# Patient Record
Sex: MALE | Race: WHITE | NOT HISPANIC OR LATINO
[De-identification: names, ages, dates, MRNs, and addresses within clinical notes are randomized per-mention and may not be internally consistent; named-entity substitution may affect disease eponyms.]

---

## 2019-07-25 ENCOUNTER — HOSPITAL (OUTPATIENT)
Dept: OTHER | Age: 54
End: 2019-07-25

## 2019-07-25 LAB
GLUCOSE BLDC GLUCOMTR-MCNC: 143 MG/DL (ref 65–99)
GLUCOSE BLDC GLUCOMTR-MCNC: ABNORMAL MG/DL

## 2019-09-11 ENCOUNTER — HOSPITAL (OUTPATIENT)
Dept: OTHER | Age: 54
End: 2019-09-11
Attending: ORTHOPAEDIC SURGERY

## 2019-12-20 PROBLEM — E11.9 TYPE 2 DIABETES MELLITUS WITHOUT COMPLICATION, WITHOUT LONG-TERM CURRENT USE OF INSULIN (HCC): Status: ACTIVE | Noted: 2019-12-20

## 2019-12-20 PROBLEM — E78.2 MIXED HYPERLIPIDEMIA: Status: ACTIVE | Noted: 2019-12-20

## 2019-12-20 PROBLEM — I10 BENIGN ESSENTIAL HTN: Status: ACTIVE | Noted: 2019-12-20

## 2020-02-24 PROBLEM — R93.1 AGATSTON CORONARY ARTERY CALCIUM SCORE GREATER THAN 400: Status: ACTIVE | Noted: 2020-02-24

## 2020-02-24 PROBLEM — I25.10 CAD IN NATIVE ARTERY: Status: ACTIVE | Noted: 2020-02-24

## 2020-02-24 PROBLEM — I42.9 IDIOPATHIC CARDIOMYOPATHY (HCC): Status: ACTIVE | Noted: 2020-02-24

## 2020-02-24 PROBLEM — I10 BENIGN ESSENTIAL HTN: Status: RESOLVED | Noted: 2019-12-20 | Resolved: 2020-02-24

## 2020-03-06 ENCOUNTER — HOSPITAL ENCOUNTER (OUTPATIENT)
Dept: GENERAL RADIOLOGY | Facility: HOSPITAL | Age: 55
Discharge: HOME | End: 2020-03-06
Attending: OTOLARYNGOLOGY
Payer: COMMERCIAL

## 2020-03-06 DIAGNOSIS — R47.02 DYSPHASIA: ICD-10-CM

## 2020-03-06 PROCEDURE — 74230 X-RAY XM SWLNG FUNCJ C+: CPT | Performed by: OTOLARYNGOLOGY

## 2020-03-06 PROCEDURE — 92611 MOTION FLUOROSCOPY/SWALLOW: CPT

## 2020-03-06 NOTE — PROGRESS NOTES
ADULT VIDEOFLUOROSCOPIC SWALLOWING STUDY    Admission Date: 3/6/2020  Evaluation Date: 03/06/20  Radiologist: Dr. Trevizo Rash: Regular  Diet Recommendations - Liquid:  Thin    Further Follow-up: swallowing. ASSESSMENT   DYSPHAGIA ASSESSMENT  Test completed in conjunction with Radiologist.  Patient Positioned: Upright;Midline;Standard Chair. Patient Viewed: Lateral.   .  Consistencies Presented: Thin liquids; Hard solid to assess oropharyngeal is likely not a purely sensory issue. Given the above, recommend consider further GI work up for possible esophageal involvement in patient's dysphagia.             PLAN: Consider further GI workup; patient indicating he would like to wait for further

## 2020-03-10 NOTE — PROGRESS NOTES
Good afternoon, Mr. Mera Guzman,  Thank you for obtaining your video swallow studies and esophagram. I am seeing in the report that you do have evidence of fluid passing the vocal cords indicating a swallow dysfunction.  The speech therapist documented that

## 2020-07-14 PROBLEM — D86.9 SARCOIDOSIS: Status: ACTIVE | Noted: 2020-07-14

## 2020-07-14 PROBLEM — C43.60: Status: ACTIVE | Noted: 2020-07-14

## 2020-09-25 RX ORDER — CANAGLIFLOZIN 100 MG/1
100 TABLET, FILM COATED ORAL
COMMUNITY
End: 2020-10-12

## 2020-09-25 RX ORDER — METFORMIN HYDROCHLORIDE 500 MG/1
TABLET, FILM COATED, EXTENDED RELEASE ORAL
COMMUNITY
End: 2020-10-14

## 2020-09-25 RX ORDER — ACETAMINOPHEN 500 MG
1000 TABLET ORAL ONCE
Status: CANCELLED | OUTPATIENT
Start: 2020-09-25 | End: 2020-09-25

## 2020-09-26 ENCOUNTER — ANESTHESIA EVENT (OUTPATIENT)
Dept: ENDOSCOPY | Facility: HOSPITAL | Age: 55
End: 2020-09-26
Payer: COMMERCIAL

## 2020-09-26 ENCOUNTER — LAB ENCOUNTER (OUTPATIENT)
Dept: LAB | Age: 55
End: 2020-09-26
Attending: INTERNAL MEDICINE
Payer: COMMERCIAL

## 2020-09-26 DIAGNOSIS — Z01.818 PREOP TESTING: ICD-10-CM

## 2020-09-27 LAB — SARS-COV-2 RNA RESP QL NAA+PROBE: NOT DETECTED

## 2020-09-28 NOTE — PROGRESS NOTES
Lab test for COVID 19 is negative     Illa Schwab, MD  [address] [place] [address] Gastroenterology

## 2020-09-28 NOTE — PAT NURSING NOTE
Chart reviewed by anesthesiologist,Dr. Tiffani Turner for cardiomyopathy. Received an order for medical and cardiac clearance. Faxed this request to the surgeon, Dr. Hima Bragg (PCP) and Dr. Geno Sosa (cardiologist). Received fax confirmation.  Telephoned Dr. Tere Bernal office

## 2020-09-29 ENCOUNTER — ANESTHESIA (OUTPATIENT)
Dept: ENDOSCOPY | Facility: HOSPITAL | Age: 55
End: 2020-09-29
Payer: COMMERCIAL

## 2020-09-29 ENCOUNTER — HOSPITAL ENCOUNTER (OUTPATIENT)
Facility: HOSPITAL | Age: 55
Setting detail: HOSPITAL OUTPATIENT SURGERY
Discharge: HOME | End: 2020-09-29
Attending: INTERNAL MEDICINE | Admitting: INTERNAL MEDICINE
Payer: COMMERCIAL

## 2020-09-29 VITALS
HEART RATE: 62 BPM | HEIGHT: 65.5 IN | TEMPERATURE: 98 F | WEIGHT: 158 LBS | OXYGEN SATURATION: 100 % | RESPIRATION RATE: 20 BRPM | SYSTOLIC BLOOD PRESSURE: 105 MMHG | BODY MASS INDEX: 26.01 KG/M2 | DIASTOLIC BLOOD PRESSURE: 63 MMHG

## 2020-09-29 DIAGNOSIS — R10.13 DYSPEPSIA: ICD-10-CM

## 2020-09-29 DIAGNOSIS — K62.5 RECTAL BLEEDING: ICD-10-CM

## 2020-09-29 DIAGNOSIS — Z01.818 PREOP TESTING: Primary | ICD-10-CM

## 2020-09-29 PROCEDURE — 88305 TISSUE EXAM BY PATHOLOGIST: CPT | Performed by: INTERNAL MEDICINE

## 2020-09-29 PROCEDURE — 0DB98ZX EXCISION OF DUODENUM, VIA NATURAL OR ARTIFICIAL OPENING ENDOSCOPIC, DIAGNOSTIC: ICD-10-PCS | Performed by: INTERNAL MEDICINE

## 2020-09-29 PROCEDURE — 0DB78ZX EXCISION OF STOMACH, PYLORUS, VIA NATURAL OR ARTIFICIAL OPENING ENDOSCOPIC, DIAGNOSTIC: ICD-10-PCS | Performed by: INTERNAL MEDICINE

## 2020-09-29 PROCEDURE — 82962 GLUCOSE BLOOD TEST: CPT

## 2020-09-29 PROCEDURE — 0DBB8ZX EXCISION OF ILEUM, VIA NATURAL OR ARTIFICIAL OPENING ENDOSCOPIC, DIAGNOSTIC: ICD-10-PCS | Performed by: INTERNAL MEDICINE

## 2020-09-29 PROCEDURE — 0DBG8ZX EXCISION OF LEFT LARGE INTESTINE, VIA NATURAL OR ARTIFICIAL OPENING ENDOSCOPIC, DIAGNOSTIC: ICD-10-PCS | Performed by: INTERNAL MEDICINE

## 2020-09-29 RX ORDER — DEXTROSE MONOHYDRATE 25 G/50ML
50 INJECTION, SOLUTION INTRAVENOUS
Status: DISCONTINUED | OUTPATIENT
Start: 2020-09-29 | End: 2020-09-29

## 2020-09-29 RX ORDER — SODIUM CHLORIDE, SODIUM LACTATE, POTASSIUM CHLORIDE, CALCIUM CHLORIDE 600; 310; 30; 20 MG/100ML; MG/100ML; MG/100ML; MG/100ML
INJECTION, SOLUTION INTRAVENOUS CONTINUOUS
Status: DISCONTINUED | OUTPATIENT
Start: 2020-09-29 | End: 2020-09-29

## 2020-09-29 RX ORDER — LIDOCAINE HYDROCHLORIDE 10 MG/ML
INJECTION, SOLUTION EPIDURAL; INFILTRATION; INTRACAUDAL; PERINEURAL AS NEEDED
Status: DISCONTINUED | OUTPATIENT
Start: 2020-09-29 | End: 2020-09-29 | Stop reason: SURG

## 2020-09-29 RX ORDER — NALOXONE HYDROCHLORIDE 0.4 MG/ML
80 INJECTION, SOLUTION INTRAMUSCULAR; INTRAVENOUS; SUBCUTANEOUS AS NEEDED
Status: DISCONTINUED | OUTPATIENT
Start: 2020-09-29 | End: 2020-09-29

## 2020-09-29 RX ADMIN — LIDOCAINE HYDROCHLORIDE 50 MG: 10 INJECTION, SOLUTION EPIDURAL; INFILTRATION; INTRACAUDAL; PERINEURAL at 07:17:00

## 2020-09-29 NOTE — ANESTHESIA PREPROCEDURE EVALUATION
PRE-OP EVALUATION    Patient Name: Lady Urena    Pre-op Diagnosis: Rectal bleeding [K62.5]  Dyspepsia [R10.13]    Procedure(s):  COLONOSCOPY / ESOPHAGOGASTRODUODENOSCOPY      Surgeon(s) and Role:     * Cookie Bianchi MD - Primary    Pre-op gilma Evaluation    Patient summary reviewed. Anesthetic Complications  (-) history of anesthetic complications         GI/Hepatic/Renal             (+) chronic renal disease and CRI                   Cardiovascular      ECG reviewed.   Exercise tolerance: goo anesthesia consent were reviewed with the patient. The consent was signed without further questions.

## 2020-09-29 NOTE — OPERATIVE REPORT
ENDOSCOPY OPERATIVE REPORT    Patient Name:  Rene Wilson  Medical Record #: [id number]  YOB: 1965  Date of Procedure: 9/29/2020    Preoperative Diagnosis:  Anemia, dyspepsia, rectal bleeding.   Hx of sarcoid    Postoperative Diagnosis unremarkable. No ulcers. Biopsies from the prepyloric area, antrum, body, and fundus were taken for histology and for H. Pylori.      A normal pylorus was passed and the duodenal bulb entered, the duodenal bulb and post-bulbar duodenum were unremarkable asi in about 10 years        Plan is to discharge home when Anesthesia post-surgical assessment determines patient is stable and has met discharge criteria.      The patient and  were informed of the endoscopic findings and was also given a copy of findin

## 2020-09-29 NOTE — BRIEF OP NOTE
Pre-Operative Diagnosis: Rectal bleeding [K62.5]  Dyspepsia [R10.13]     Post-Operative Diagnosis: Duodenal diverticulum, ileal and colonic ulcers      Procedure Performed:   Procedure(s):  COLONOSCOPY with biopsies  ESOPHAGOGASTRODUODENOSCOPY with biopsie

## 2020-09-29 NOTE — H&P
I have examined this patient and there are no changes to h/p 9/14/20,advised he f/u w/Dr Bob Castro for non gi causes of anemia.     We rediscussed the risks, benefits, alternatives and limitations to the procedure and sedation, all the patients questions were

## 2020-09-29 NOTE — ANESTHESIA POSTPROCEDURE EVALUATION
Lina Merino 421 Patient Status:  Hospital Outpatient Surgery   Age/Gender 60year old male MRN [id number]   Location [facility]. Attending Judith Walker MD   Hosp Day # 0 PCP Tia Mcbride DO       Anesthesia Post-

## 2020-10-02 NOTE — PROGRESS NOTES
Here are the biopsy/pathology findings from your recent EGD (uppper endoscopy) and colonoscopy:    1) upper endoscopy appeared okay.  Stomach and small bowel biopsies were taken   -->biposies did not show celiac disease or the H.pylori stomach bacteria    2

## 2020-10-12 PROBLEM — D63.8 ANEMIA OF CHRONIC ILLNESS: Status: ACTIVE | Noted: 2020-10-12

## 2020-10-12 PROBLEM — D86.0 SARCOIDOSIS OF LUNG (HCC): Status: ACTIVE | Noted: 2020-07-14

## 2020-10-23 DIAGNOSIS — D64.9 ANEMIA: Primary | ICD-10-CM

## 2020-10-23 PROCEDURE — 88313 SPECIAL STAINS GROUP 2: CPT | Performed by: NURSE PRACTITIONER

## 2020-10-23 PROCEDURE — 88305 TISSUE EXAM BY PATHOLOGIST: CPT | Performed by: NURSE PRACTITIONER

## 2020-10-23 PROCEDURE — 88341 IMHCHEM/IMCYTCHM EA ADD ANTB: CPT | Performed by: NURSE PRACTITIONER

## 2020-10-23 PROCEDURE — 88342 IMHCHEM/IMCYTCHM 1ST ANTB: CPT | Performed by: NURSE PRACTITIONER

## 2020-10-24 PROCEDURE — 85097 BONE MARROW INTERPRETATION: CPT | Performed by: NURSE PRACTITIONER

## 2020-10-24 PROCEDURE — 88305 TISSUE EXAM BY PATHOLOGIST: CPT | Performed by: NURSE PRACTITIONER

## 2020-11-18 ENCOUNTER — HOSPITAL ENCOUNTER (OUTPATIENT)
Dept: RESPIRATORY THERAPY | Facility: HOSPITAL | Age: 55
Discharge: HOME | End: 2020-11-18
Attending: INTERNAL MEDICINE
Payer: COMMERCIAL

## 2020-11-18 PROCEDURE — 84132 ASSAY OF SERUM POTASSIUM: CPT | Performed by: INTERNAL MEDICINE

## 2020-11-18 PROCEDURE — 84295 ASSAY OF SERUM SODIUM: CPT | Performed by: INTERNAL MEDICINE

## 2020-11-18 PROCEDURE — 83605 ASSAY OF LACTIC ACID: CPT | Performed by: INTERNAL MEDICINE

## 2020-11-18 PROCEDURE — 83050 HGB METHEMOGLOBIN QUAN: CPT | Performed by: INTERNAL MEDICINE

## 2020-11-18 PROCEDURE — 85018 HEMOGLOBIN: CPT | Performed by: INTERNAL MEDICINE

## 2020-11-18 PROCEDURE — 82375 ASSAY CARBOXYHB QUANT: CPT | Performed by: INTERNAL MEDICINE

## 2020-11-18 PROCEDURE — 36600 WITHDRAWAL OF ARTERIAL BLOOD: CPT | Performed by: INTERNAL MEDICINE

## 2020-11-18 PROCEDURE — 82805 BLOOD GASES W/O2 SATURATION: CPT | Performed by: INTERNAL MEDICINE

## 2020-11-18 PROCEDURE — 82330 ASSAY OF CALCIUM: CPT | Performed by: INTERNAL MEDICINE

## 2020-11-18 NOTE — PROGRESS NOTES
Contacted patient. Discussed results with patient and patient's wife who will share results with pulmonologist at [facility].

## 2022-05-10 ENCOUNTER — TELEPHONE (OUTPATIENT)
Dept: OTOLARYNGOLOGY | Facility: CLINIC | Age: 57
End: 2022-05-10

## 2022-05-10 NOTE — TELEPHONE ENCOUNTER
Consuelo Samples from dr. Cb Cho office, [facility] called requesting to speak to the surgery scheduler to schedule joint surgery.   Call

## 2022-06-19 ENCOUNTER — LAB ENCOUNTER (OUTPATIENT)
Dept: LAB | Age: 57
End: 2022-06-19
Attending: ORTHOPAEDIC SURGERY
Payer: COMMERCIAL

## 2022-06-19 DIAGNOSIS — Z01.818 PRE-OP TESTING: ICD-10-CM

## 2022-06-20 LAB — SARS-COV-2 RNA RESP QL NAA+PROBE: NOT DETECTED

## 2022-06-21 ENCOUNTER — ANESTHESIA EVENT (OUTPATIENT)
Dept: SURGERY | Facility: HOSPITAL | Age: 57
End: 2022-06-21
Payer: COMMERCIAL

## 2022-06-21 RX ORDER — FAMOTIDINE 20 MG
1 TABLET ORAL
COMMUNITY

## 2022-06-21 RX ORDER — ROSUVASTATIN CALCIUM 20 MG/1
20 TABLET, COATED ORAL NIGHTLY
COMMUNITY

## 2022-06-22 ENCOUNTER — HOSPITAL ENCOUNTER (INPATIENT)
Facility: HOSPITAL | Age: 57
LOS: 1 days | Discharge: HOME | End: 2022-06-23
Attending: ORTHOPAEDIC SURGERY | Admitting: ORTHOPAEDIC SURGERY
Payer: COMMERCIAL

## 2022-06-22 ENCOUNTER — APPOINTMENT (OUTPATIENT)
Dept: GENERAL RADIOLOGY | Facility: HOSPITAL | Age: 57
End: 2022-06-22
Attending: ORTHOPAEDIC SURGERY
Payer: COMMERCIAL

## 2022-06-22 ENCOUNTER — ANESTHESIA (OUTPATIENT)
Dept: SURGERY | Facility: HOSPITAL | Age: 57
End: 2022-06-22
Payer: COMMERCIAL

## 2022-06-22 DIAGNOSIS — E11.9 TYPE 2 DIABETES MELLITUS WITHOUT COMPLICATION, WITHOUT LONG-TERM CURRENT USE OF INSULIN (HCC): ICD-10-CM

## 2022-06-22 DIAGNOSIS — Z01.818 PRE-OP TESTING: Primary | ICD-10-CM

## 2022-06-22 PROBLEM — R13.10 DYSPHAGIA: Status: ACTIVE | Noted: 2022-06-22

## 2022-06-22 LAB
GLUCOSE BLDC GLUCOMTR-MCNC: 109 MG/DL (ref 70–99)
GLUCOSE BLDC GLUCOMTR-MCNC: 140 MG/DL (ref 70–99)
GLUCOSE BLDC GLUCOMTR-MCNC: 194 MG/DL (ref 70–99)
GLUCOSE BLDC GLUCOMTR-MCNC: 89 MG/DL (ref 70–99)
GLUCOSE BLDC GLUCOMTR-MCNC: 96 MG/DL (ref 70–99)

## 2022-06-22 PROCEDURE — 0PB30ZZ EXCISION OF CERVICAL VERTEBRA, OPEN APPROACH: ICD-10-PCS | Performed by: ORTHOPAEDIC SURGERY

## 2022-06-22 PROCEDURE — 22554 ARTHRD ANT NTRBD MIN DSC CRV: CPT | Performed by: OTOLARYNGOLOGY

## 2022-06-22 PROCEDURE — 76000 FLUOROSCOPY <1 HR PHYS/QHP: CPT | Performed by: ORTHOPAEDIC SURGERY

## 2022-06-22 PROCEDURE — 22585 ARTHRD ANT NTRBD MIN DSC EA: CPT | Performed by: OTOLARYNGOLOGY

## 2022-06-22 RX ORDER — ONDANSETRON 2 MG/ML
4 INJECTION INTRAMUSCULAR; INTRAVENOUS EVERY 4 HOURS PRN
Status: ACTIVE | OUTPATIENT
Start: 2022-06-22 | End: 2022-06-23

## 2022-06-22 RX ORDER — SODIUM PHOSPHATE, DIBASIC AND SODIUM PHOSPHATE, MONOBASIC 7; 19 G/133ML; G/133ML
1 ENEMA RECTAL ONCE AS NEEDED
Status: DISCONTINUED | OUTPATIENT
Start: 2022-06-22 | End: 2022-06-23

## 2022-06-22 RX ORDER — AMLODIPINE BESYLATE 10 MG/1
10 TABLET ORAL EVERY MORNING
Status: DISCONTINUED | OUTPATIENT
Start: 2022-06-22 | End: 2022-06-23

## 2022-06-22 RX ORDER — POLYETHYLENE GLYCOL 3350 17 G/17G
17 POWDER, FOR SOLUTION ORAL DAILY PRN
Status: DISCONTINUED | OUTPATIENT
Start: 2022-06-22 | End: 2022-06-23

## 2022-06-22 RX ORDER — ROSUVASTATIN CALCIUM 20 MG/1
20 TABLET, COATED ORAL NIGHTLY
Status: DISCONTINUED | OUTPATIENT
Start: 2022-06-22 | End: 2022-06-23

## 2022-06-22 RX ORDER — BISACODYL 10 MG
10 SUPPOSITORY, RECTAL RECTAL
Status: DISCONTINUED | OUTPATIENT
Start: 2022-06-22 | End: 2022-06-23

## 2022-06-22 RX ORDER — HYDROMORPHONE HYDROCHLORIDE 1 MG/ML
0.2 INJECTION, SOLUTION INTRAMUSCULAR; INTRAVENOUS; SUBCUTANEOUS EVERY 2 HOUR PRN
Status: DISCONTINUED | OUTPATIENT
Start: 2022-06-22 | End: 2022-06-23

## 2022-06-22 RX ORDER — NICOTINE POLACRILEX 4 MG
30 LOZENGE BUCCAL
Status: DISCONTINUED | OUTPATIENT
Start: 2022-06-22 | End: 2022-06-23

## 2022-06-22 RX ORDER — HYDROCODONE BITARTRATE AND ACETAMINOPHEN 10; 325 MG/1; MG/1
2 TABLET ORAL EVERY 4 HOURS PRN
Status: DISCONTINUED | OUTPATIENT
Start: 2022-06-22 | End: 2022-06-23

## 2022-06-22 RX ORDER — DEXTROSE MONOHYDRATE 25 G/50ML
50 INJECTION, SOLUTION INTRAVENOUS
Status: DISCONTINUED | OUTPATIENT
Start: 2022-06-22 | End: 2022-06-23

## 2022-06-22 RX ORDER — NALOXONE HYDROCHLORIDE 0.4 MG/ML
80 INJECTION, SOLUTION INTRAMUSCULAR; INTRAVENOUS; SUBCUTANEOUS AS NEEDED
Status: DISCONTINUED | OUTPATIENT
Start: 2022-06-22 | End: 2022-06-22 | Stop reason: HOSPADM

## 2022-06-22 RX ORDER — HYDROMORPHONE HYDROCHLORIDE 1 MG/ML
0.8 INJECTION, SOLUTION INTRAMUSCULAR; INTRAVENOUS; SUBCUTANEOUS EVERY 2 HOUR PRN
Status: DISCONTINUED | OUTPATIENT
Start: 2022-06-22 | End: 2022-06-23

## 2022-06-22 RX ORDER — METHOCARBAMOL 750 MG/1
750 TABLET, FILM COATED ORAL EVERY 6 HOURS PRN
Status: DISCONTINUED | OUTPATIENT
Start: 2022-06-22 | End: 2022-06-23

## 2022-06-22 RX ORDER — HYDROMORPHONE HYDROCHLORIDE 1 MG/ML
0.6 INJECTION, SOLUTION INTRAMUSCULAR; INTRAVENOUS; SUBCUTANEOUS EVERY 5 MIN PRN
Status: DISCONTINUED | OUTPATIENT
Start: 2022-06-22 | End: 2022-06-22 | Stop reason: HOSPADM

## 2022-06-22 RX ORDER — HYDROMORPHONE HYDROCHLORIDE 1 MG/ML
0.4 INJECTION, SOLUTION INTRAMUSCULAR; INTRAVENOUS; SUBCUTANEOUS EVERY 2 HOUR PRN
Status: DISCONTINUED | OUTPATIENT
Start: 2022-06-22 | End: 2022-06-23

## 2022-06-22 RX ORDER — HYDROMORPHONE HYDROCHLORIDE 1 MG/ML
0.2 INJECTION, SOLUTION INTRAMUSCULAR; INTRAVENOUS; SUBCUTANEOUS EVERY 5 MIN PRN
Status: DISCONTINUED | OUTPATIENT
Start: 2022-06-22 | End: 2022-06-22 | Stop reason: HOSPADM

## 2022-06-22 RX ORDER — ACETAMINOPHEN 500 MG
1000 TABLET ORAL ONCE
Status: COMPLETED | OUTPATIENT
Start: 2022-06-22 | End: 2022-06-22

## 2022-06-22 RX ORDER — HYDROMORPHONE HYDROCHLORIDE 1 MG/ML
0.4 INJECTION, SOLUTION INTRAMUSCULAR; INTRAVENOUS; SUBCUTANEOUS EVERY 5 MIN PRN
Status: DISCONTINUED | OUTPATIENT
Start: 2022-06-22 | End: 2022-06-22 | Stop reason: HOSPADM

## 2022-06-22 RX ORDER — CEFAZOLIN SODIUM/WATER 2 G/20 ML
2 SYRINGE (ML) INTRAVENOUS EVERY 8 HOURS
Status: COMPLETED | OUTPATIENT
Start: 2022-06-22 | End: 2022-06-23

## 2022-06-22 RX ORDER — SODIUM CHLORIDE, SODIUM LACTATE, POTASSIUM CHLORIDE, CALCIUM CHLORIDE 600; 310; 30; 20 MG/100ML; MG/100ML; MG/100ML; MG/100ML
INJECTION, SOLUTION INTRAVENOUS CONTINUOUS
Status: DISCONTINUED | OUTPATIENT
Start: 2022-06-22 | End: 2022-06-23

## 2022-06-22 RX ORDER — ONDANSETRON 2 MG/ML
INJECTION INTRAMUSCULAR; INTRAVENOUS AS NEEDED
Status: DISCONTINUED | OUTPATIENT
Start: 2022-06-22 | End: 2022-06-22 | Stop reason: SURG

## 2022-06-22 RX ORDER — DEXAMETHASONE SODIUM PHOSPHATE 4 MG/ML
10 VIAL (ML) INJECTION EVERY 6 HOURS
Status: COMPLETED | OUTPATIENT
Start: 2022-06-22 | End: 2022-06-23

## 2022-06-22 RX ORDER — PROCHLORPERAZINE EDISYLATE 5 MG/ML
10 INJECTION INTRAMUSCULAR; INTRAVENOUS EVERY 6 HOURS PRN
Status: DISCONTINUED | OUTPATIENT
Start: 2022-06-22 | End: 2022-06-23

## 2022-06-22 RX ORDER — NICOTINE POLACRILEX 4 MG
15 LOZENGE BUCCAL
Status: DISCONTINUED | OUTPATIENT
Start: 2022-06-22 | End: 2022-06-23

## 2022-06-22 RX ORDER — DEXAMETHASONE SODIUM PHOSPHATE 4 MG/ML
VIAL (ML) INJECTION AS NEEDED
Status: DISCONTINUED | OUTPATIENT
Start: 2022-06-22 | End: 2022-06-22 | Stop reason: SURG

## 2022-06-22 RX ORDER — GABAPENTIN 300 MG/1
300 CAPSULE ORAL 3 TIMES DAILY
Status: DISCONTINUED | OUTPATIENT
Start: 2022-06-22 | End: 2022-06-23

## 2022-06-22 RX ORDER — DEXTROSE MONOHYDRATE 25 G/50ML
50 INJECTION, SOLUTION INTRAVENOUS
Status: DISCONTINUED | OUTPATIENT
Start: 2022-06-22 | End: 2022-06-22 | Stop reason: HOSPADM

## 2022-06-22 RX ORDER — SENNOSIDES 8.6 MG
17.2 TABLET ORAL NIGHTLY
Status: DISCONTINUED | OUTPATIENT
Start: 2022-06-22 | End: 2022-06-23

## 2022-06-22 RX ORDER — NICOTINE POLACRILEX 4 MG
15 LOZENGE BUCCAL
Status: DISCONTINUED | OUTPATIENT
Start: 2022-06-22 | End: 2022-06-22 | Stop reason: HOSPADM

## 2022-06-22 RX ORDER — INDOMETHACIN 75 MG/1
75 CAPSULE, EXTENDED RELEASE ORAL 2 TIMES DAILY WITH MEALS
Status: DISCONTINUED | OUTPATIENT
Start: 2022-06-23 | End: 2022-06-23

## 2022-06-22 RX ORDER — MIDAZOLAM HYDROCHLORIDE 1 MG/ML
INJECTION INTRAMUSCULAR; INTRAVENOUS AS NEEDED
Status: DISCONTINUED | OUTPATIENT
Start: 2022-06-22 | End: 2022-06-22 | Stop reason: SURG

## 2022-06-22 RX ORDER — DIPHENHYDRAMINE HYDROCHLORIDE 50 MG/ML
25 INJECTION INTRAMUSCULAR; INTRAVENOUS EVERY 4 HOURS PRN
Status: DISCONTINUED | OUTPATIENT
Start: 2022-06-22 | End: 2022-06-23

## 2022-06-22 RX ORDER — SODIUM CHLORIDE, SODIUM LACTATE, POTASSIUM CHLORIDE, CALCIUM CHLORIDE 600; 310; 30; 20 MG/100ML; MG/100ML; MG/100ML; MG/100ML
INJECTION, SOLUTION INTRAVENOUS CONTINUOUS
Status: DISCONTINUED | OUTPATIENT
Start: 2022-06-22 | End: 2022-06-22 | Stop reason: HOSPADM

## 2022-06-22 RX ORDER — MORPHINE SULFATE 10 MG/ML
6 INJECTION, SOLUTION INTRAMUSCULAR; INTRAVENOUS EVERY 10 MIN PRN
Status: DISCONTINUED | OUTPATIENT
Start: 2022-06-22 | End: 2022-06-22 | Stop reason: HOSPADM

## 2022-06-22 RX ORDER — CEFAZOLIN SODIUM/WATER 2 G/20 ML
2 SYRINGE (ML) INTRAVENOUS ONCE
Status: COMPLETED | OUTPATIENT
Start: 2022-06-22 | End: 2022-06-22

## 2022-06-22 RX ORDER — ROCURONIUM BROMIDE 10 MG/ML
INJECTION, SOLUTION INTRAVENOUS AS NEEDED
Status: DISCONTINUED | OUTPATIENT
Start: 2022-06-22 | End: 2022-06-22 | Stop reason: SURG

## 2022-06-22 RX ORDER — LIDOCAINE HYDROCHLORIDE 10 MG/ML
INJECTION, SOLUTION EPIDURAL; INFILTRATION; INTRACAUDAL; PERINEURAL AS NEEDED
Status: DISCONTINUED | OUTPATIENT
Start: 2022-06-22 | End: 2022-06-22 | Stop reason: SURG

## 2022-06-22 RX ORDER — VALSARTAN 160 MG/1
1 TABLET ORAL
COMMUNITY
Start: 2022-06-05

## 2022-06-22 RX ORDER — DIPHENHYDRAMINE HCL 25 MG
25 CAPSULE ORAL EVERY 4 HOURS PRN
Status: DISCONTINUED | OUTPATIENT
Start: 2022-06-22 | End: 2022-06-23

## 2022-06-22 RX ORDER — ACETAMINOPHEN 325 MG/1
650 TABLET ORAL EVERY 4 HOURS PRN
Status: DISCONTINUED | OUTPATIENT
Start: 2022-06-22 | End: 2022-06-23

## 2022-06-22 RX ORDER — MORPHINE SULFATE 4 MG/ML
2 INJECTION, SOLUTION INTRAMUSCULAR; INTRAVENOUS EVERY 10 MIN PRN
Status: DISCONTINUED | OUTPATIENT
Start: 2022-06-22 | End: 2022-06-22 | Stop reason: HOSPADM

## 2022-06-22 RX ORDER — MORPHINE SULFATE 4 MG/ML
4 INJECTION, SOLUTION INTRAMUSCULAR; INTRAVENOUS EVERY 10 MIN PRN
Status: DISCONTINUED | OUTPATIENT
Start: 2022-06-22 | End: 2022-06-22 | Stop reason: HOSPADM

## 2022-06-22 RX ORDER — DOCUSATE SODIUM 100 MG/1
100 CAPSULE, LIQUID FILLED ORAL 2 TIMES DAILY
Status: DISCONTINUED | OUTPATIENT
Start: 2022-06-22 | End: 2022-06-23

## 2022-06-22 RX ORDER — HYDROCODONE BITARTRATE AND ACETAMINOPHEN 10; 325 MG/1; MG/1
1 TABLET ORAL EVERY 4 HOURS PRN
Status: DISCONTINUED | OUTPATIENT
Start: 2022-06-22 | End: 2022-06-23

## 2022-06-22 RX ORDER — NICOTINE POLACRILEX 4 MG
30 LOZENGE BUCCAL
Status: DISCONTINUED | OUTPATIENT
Start: 2022-06-22 | End: 2022-06-22 | Stop reason: HOSPADM

## 2022-06-22 RX ORDER — SODIUM CHLORIDE 9 MG/ML
INJECTION, SOLUTION INTRAVENOUS CONTINUOUS PRN
Status: DISCONTINUED | OUTPATIENT
Start: 2022-06-22 | End: 2022-06-22 | Stop reason: SURG

## 2022-06-22 RX ADMIN — DEXAMETHASONE SODIUM PHOSPHATE 10 MG: 4 MG/ML VIAL (ML) INJECTION at 09:01:00

## 2022-06-22 RX ADMIN — CEFAZOLIN SODIUM/WATER 2 G: 2 G/20 ML SYRINGE (ML) INTRAVENOUS at 08:56:00

## 2022-06-22 RX ADMIN — SODIUM CHLORIDE: 9 INJECTION, SOLUTION INTRAVENOUS at 08:45:00

## 2022-06-22 RX ADMIN — ROCURONIUM BROMIDE 50 MG: 10 INJECTION, SOLUTION INTRAVENOUS at 08:44:00

## 2022-06-22 RX ADMIN — SODIUM CHLORIDE, SODIUM LACTATE, POTASSIUM CHLORIDE, CALCIUM CHLORIDE: 600; 310; 30; 20 INJECTION, SOLUTION INTRAVENOUS at 09:27:00

## 2022-06-22 RX ADMIN — SODIUM CHLORIDE: 9 INJECTION, SOLUTION INTRAVENOUS at 10:26:00

## 2022-06-22 RX ADMIN — SODIUM CHLORIDE, SODIUM LACTATE, POTASSIUM CHLORIDE, CALCIUM CHLORIDE: 600; 310; 30; 20 INJECTION, SOLUTION INTRAVENOUS at 10:26:00

## 2022-06-22 RX ADMIN — MIDAZOLAM HYDROCHLORIDE 2 MG: 1 INJECTION INTRAMUSCULAR; INTRAVENOUS at 08:33:00

## 2022-06-22 RX ADMIN — LIDOCAINE HYDROCHLORIDE 50 MG: 10 INJECTION, SOLUTION EPIDURAL; INFILTRATION; INTRACAUDAL; PERINEURAL at 08:40:00

## 2022-06-22 RX ADMIN — SODIUM CHLORIDE, SODIUM LACTATE, POTASSIUM CHLORIDE, CALCIUM CHLORIDE: 600; 310; 30; 20 INJECTION, SOLUTION INTRAVENOUS at 11:08:00

## 2022-06-22 RX ADMIN — ONDANSETRON 4 MG: 2 INJECTION INTRAMUSCULAR; INTRAVENOUS at 11:02:00

## 2022-06-22 NOTE — BRIEF OP NOTE
Pre-Operative Diagnosis: dysphagia, diffuse idiopathic skeletal hypertosis cervical spine     Post-Operative Diagnosis: dysphagia, diffuse idiopathic skeletal hypertosis cervical spine      Procedure Performed:    Anterior  Cervical approach    Surgeon(s) and Role:     * Gray Villa MD - Primary     Mignon Trujillo MD - Assisting Surgeon    Assistant(s):  PA: Pablito Bustillos PA-C     Surgical Findings:       Specimen      Estimated Blood Loss: No data recorded    Dictation Number:       Columba Mohr MD  6/22/2022  3:07 PM

## 2022-06-22 NOTE — PLAN OF CARE
Pt is A&Fide. RA. CPAP @. Remote tele. SCDs for dvt prophyalxis. Last BM was 6/21. PRN dilaudid and norco for pain management. Up self. DAMIR drain in place. General diet. Tolerating clear liquids well. Saline locked. IV ancef. ACHS. 4x4  and tegaderm. Plan for home when clear. Problem: Patient Centered Care  Goal: Patient preferences are identified and integrated in the patient's plan of care  Description: Interventions:  - What would you like us to know as we care for you? My wife is here. - Provide timely, complete, and accurate information to patient/family  - Incorporate patient and family knowledge, values, beliefs, and cultural backgrounds into the planning and delivery of care  - Encourage patient/family to participate in care and decision-making at the level they choose  - Honor patient and family perspectives and choices  Outcome: Progressing     Problem: Patient/Family Goals  Goal: Patient/Family Long Term Goal  Description: Patient's Long Term Goal: To go home without pain. Interventions:  - Pain meds  - See additional Care Plan goals for specific interventions  Outcome: Progressing  Goal: Patient/Family Short Term Goal  Description: Patient's Short Term Goal: To go home.      Interventions:   - Be cleared medically   - See additional Care Plan goals for specific interventions  Outcome: Progressing     Problem: PAIN - ADULT  Goal: Verbalizes/displays adequate comfort level or patient's stated pain goal  Description: INTERVENTIONS:  - Encourage pt to monitor pain and request assistance  - Assess pain using appropriate pain scale  - Administer analgesics based on type and severity of pain and evaluate response  - Implement non-pharmacological measures as appropriate and evaluate response  - Consider cultural and social influences on pain and pain management  - Manage/alleviate anxiety  - Utilize distraction and/or relaxation techniques  - Monitor for opioid side effects  - Notify MD/LIP if interventions unsuccessful or patient reports new pain  - Anticipate increased pain with activity and pre-medicate as appropriate  Outcome: Progressing     Problem: RISK FOR INFECTION - ADULT  Goal: Absence of fever/infection during anticipated neutropenic period  Description: INTERVENTIONS  - Monitor WBC  - Administer growth factors as ordered  - Implement neutropenic guidelines  Outcome: Progressing     Problem: SAFETY ADULT - FALL  Goal: Free from fall injury  Description: INTERVENTIONS:  - Assess pt frequently for physical needs  - Identify cognitive and physical deficits and behaviors that affect risk of falls.   - [place] fall precautions as indicated by assessment.  - Educate pt/family on patient safety including physical limitations  - Instruct pt to call for assistance with activity based on assessment  - Modify environment to reduce risk of injury  - Provide assistive devices as appropriate  - Consider OT/PT consult to assist with strengthening/mobility  - Encourage toileting schedule  Outcome: Progressing     Problem: DISCHARGE PLANNING  Goal: Discharge to home or other facility with appropriate resources  Description: INTERVENTIONS:  - Identify barriers to discharge w/pt and caregiver  - Include patient/family/discharge partner in discharge planning  - Arrange for needed discharge resources and transportation as appropriate  - Identify discharge learning needs (meds, wound care, etc)  - Arrange for interpreters to assist at discharge as needed  - Consider post-discharge preferences of patient/family/discharge partner  - Complete POLST form as appropriate  - Assess patient's ability to be responsible for managing their own health  - Refer to Case Management Department for coordinating discharge planning if the patient needs post-hospital services based on physician/LIP order or complex needs related to functional status, cognitive ability or social support system  Outcome: Progressing

## 2022-06-22 NOTE — ANESTHESIA PROCEDURE NOTES
Arterial Line  Performed by: Lena Grady CRNA  Authorized by: eLna Grady CRNA     General Information and Staff    Procedure Start:  6/22/2022 8:53 AM  Procedure End:  6/22/2022 8:55 AM  Anesthesiologist:  Casey Ortega MD  CRNA:  Lena Grady CRNA  Performed By:  CRNA  Patient Location:  OR  Indication: continuous blood pressure monitoring and blood sampling needed    Site Identification: surface landmarks    Preanesthetic Checklist: 2 patient identifiers, IV checked, risks and benefits discussed, monitors and equipment checked, pre-op evaluation, timeout performed, anesthesia consent and sterile technique used    Procedure Details    Catheter Size:  20 G  Catheter Length:  1 and 3/4 inchCatheter Type:  Arrow  Seldinger Technique?: Yes    Laterality:  LeftSite:  Radial artery  Site Prep: chlorhexidine  Line Secured:  Wrist Brace, tape and Tegaderm    Assessment    Events: patient tolerated procedure well with no complications      Medications      Additional Comments

## 2022-06-22 NOTE — H&P
Patient: [address] Record Number: [id number]  Referring Physician:  No ref. provider found  PCP: Fawn Gonzalez, DO      I have carefully reviewed the patient's intake questionnaire before our encounter today. HISTORY OF CHIEF COMPLAINT:    Leatha Vickers is a 59year old male, who presents today for surgical intervention of an osteophytectomy at C2, C3, C4, and C5, with a possible anterior cervical discectomy due to pain and difficulty with swallowing. He denies any radicular symptoms to his arms. IMAGING STUDIES:      CT SPINE CERVICAL (CPT=72125)  Narrative: DATE OF SERVICE: 14.20.3330  CT SPINE CERVICAL (CPT=72125)    CLINICAL INFORMATION: Diffuse idiopathic skeletal hyperostosis of cervical spine    COMPARISON: PET CT scan dated 12/23/2020. Mary Arreguin TECHNIQUE:  Helical spiral imaging was performed from the base of the skull through T1, with  multiplanar reformatted images in the sagittal and coronal planes, utilizing standard protocol. Automated exposure control and ALARA manual techniques for patient specific dose reduction were  followed while maintaining the necessary diagnostic image quality. FINDINGS:Anterior large bridging osteophytes involving more than 3 vertebral bodies consistent with  diffuse idiopathic skeletal hyperostosis. Focal ossification of the alar ligaments and the transverse ligament. Solid 3 mm right apical pulmonary nodules stable since the PET CT scan dated 12/23/2020 consistent  with noncalcified granuloma. Stable 4 mm subpleural right apical pulmonary nodule also consistent  with noncalcified granuloma. C1-2: Ossification of the transverse ligament. Narrowing of the anterior atlantoaxial distance with  marginal osteophytes. C2-3: Large focal area of heterotopic ossification within the right neural foramen measuring 1.0 x  1.0 x 0.5 cm (CC x ML x AP) causing severe right neural foraminal stenosis. Mild left uncovertebral  osteophytes.  Patent left neural foramen. No central canal stenosis. C3-4: Vacuum phenomenon across old fractured anterior osteophyte with the C3 vertebral body. Left  uncovertebral osteophytes. Patent neural foramina. No central canal stenosis. C4-5: Symmetric disc bulge. Mild central canal stenosis. Patent neural foramina. Vacuum phenomenon  at the anterior disc. C5-6: Patent neural foramina. No central canal stenosis. C6-7: Patent neural foramina. No central canal stenosis. C7-T1: Patent neural foramina. No central canal stenosis. Impression: IMPRESSION:     1. Diffuse idiopathic skeletal hyperostosis. 2. Large focal area of heterotopic ossification within the right C2-C3 neural foramen causing severe  neural foraminal stenosis. 3. Old fracture anterior osteophytes at C3-C4 with vacuum phenomenon between the fractured  osteophyte and the C3 vertebral body consistent with pseudoarthrosis. [place] PHYSICAL EXAMIMATION   General: AAOx3  Lungs: non-labored breathing  Cardiac: normal rate  Strength: full strength in bilateral upper extremities                                                                                       MEDICAL DECISION MAKING:   Impression: Dysphagia   Diffuse idiopathic skeletal hyperostosis of the cervical spine    Plan: We will proceed with surgical intervention of an osteophytectomy C2, C3, C4, and C5 fwith possible anterior cervical discectomy. The procedure, risks, and recovery were discussed with the patient and all of his questions were answered to his satisfaction. Follow up post operatively    The patient indicates understanding of these issues and agrees to the plan. Anabel Morgan PA-C/ Lobito Mcmullen.  Cyndra Lundborg, MD, FAAOS  Division of [address] [place] [address]

## 2022-06-22 NOTE — OPERATIVE REPORT
Jamel Duke  DATE OF SURGERY: 6/22/2022  PREOPERATIVE DIAGNOSIS: dysphagia secondary to cervical osteophytes from diffuse idiopathic skeletal hyperostosis  POSTOPERATIVE DIAGNOSIS: Same. OPERATIVE PROCEDURE:  Anterior exposure for multilevel osteophytectomies  ATTENDING SURGEON:   MD Derek Ladd M.D.   ASSISTANT  :    Lev Rodriguez PA-C  ANESTHESIA:  GENERAL  INDICATIONS FOR PROCEDURE:  Refer to history and physical   DESCRIPTION OF PROCEDURE:    After obtaining   informed consent, the patient was taken to the operating room and placed supine on the operating table. After adequate general anesthesia was achieved, a shoulder roll was placed and the head and neck were appropriately positioned. The head and neck were then prepped and draped in sterile manner. Appropriate radiographic  Cervical spine landmarks/levels  were  identified via C arm under direction of Dr Farhad Matthew. A left sided transverse incision was  planned  at the the appropriate level and in a natural skin crease. The skin was incised  with a 15 blade and subcutaneous tissue was divided. The platysma was identified and divided. The deep cervical fascia was divided along the anterolateral border of the sternocleidomastoid muscle. The carotid pulse was identified. Using a combination of blunt and sharp dissection,  a plane was created between the carotid sheath laterally and the larynx and esophagus medially. Care was taken to protect adjacent neurovascular structures from injury. The hypoglossal nerve was identified superiorly and protected from injury. Exposure was achieved from C-2 through C-5. Meticulous hemostasis was achieved with bipolar cautery. After identification of the prevertebral fascia, Dr Farhad Matthew  proceeded with the  Spinal portion of the surgery.

## 2022-06-22 NOTE — ANESTHESIA PROCEDURE NOTES
Peripheral IV  Date/Time: 6/22/2022 8:45 AM  Inserted by: Dian Mullins MD    Placement  Needle size: 18 G  Laterality: left  Location: wrist  Local anesthetic: none  Site prep: alcohol  Technique: anatomical landmarks  Attempts: 1

## 2022-06-22 NOTE — ANESTHESIA PROCEDURE NOTES
Airway  Date/Time: 6/22/2022 8:41 AM  Urgency: Elective      General Information and Staff    Patient location during procedure: OR  Anesthesiologist: Ophelia Moreno MD  Resident/CRNA: Arthur Ayala CRNA  Performed: CRNA     Indications and Patient Condition  Indications for airway management: anesthesia  Sedation level: deep  Preoxygenated: yes  Patient position: sniffing  Mask difficulty assessment: 0 - not attempted    Final Airway Details  Final airway type: endotracheal airway      Successful airway: ETT  Cuffed: yes   Successful intubation technique: Video laryngoscopy  Facilitating devices/methods: intubating stylet  Endotracheal tube insertion site: oral  Blade: GlideScope  Blade size: #3  ETT size (mm): 7.5    Cormack-Lehane Classification: grade IIA - partial view of glottis  Placement verified by: chest auscultation and capnometry   Measured from: lips  ETT to lips (cm): 23  Number of attempts at approach: 1  Number of other approaches attempted: 0    Additional Comments  Atraumatic. Lips, tongue and all teeth in preop condition.

## 2022-06-23 VITALS
OXYGEN SATURATION: 98 % | SYSTOLIC BLOOD PRESSURE: 113 MMHG | WEIGHT: 170 LBS | DIASTOLIC BLOOD PRESSURE: 65 MMHG | HEIGHT: 66.5 IN | RESPIRATION RATE: 16 BRPM | BODY MASS INDEX: 27 KG/M2 | TEMPERATURE: 98 F | HEART RATE: 96 BPM

## 2022-06-23 LAB
ANION GAP SERPL CALC-SCNC: 11 MMOL/L (ref 0–18)
BUN BLD-MCNC: 24 MG/DL (ref 7–18)
BUN/CREAT SERPL: 24.2 (ref 10–20)
CALCIUM BLD-MCNC: 9 MG/DL (ref 8.5–10.1)
CHLORIDE SERPL-SCNC: 105 MMOL/L (ref 98–112)
CO2 SERPL-SCNC: 22 MMOL/L (ref 21–32)
CREAT BLD-MCNC: 0.99 MG/DL
GLUCOSE BLD-MCNC: 186 MG/DL (ref 70–99)
GLUCOSE BLDC GLUCOMTR-MCNC: 166 MG/DL (ref 70–99)
GLUCOSE BLDC GLUCOMTR-MCNC: 430 MG/DL (ref 70–99)
HCT VFR BLD AUTO: 45 %
HGB BLD-MCNC: 14.2 G/DL
OSMOLALITY SERPL CALC.SUM OF ELEC: 295 MOSM/KG (ref 275–295)
POTASSIUM SERPL-SCNC: 4.2 MMOL/L (ref 3.5–5.1)
SODIUM SERPL-SCNC: 138 MMOL/L (ref 136–145)

## 2022-06-23 RX ORDER — EMPAGLIFLOZIN 25 MG/1
1 TABLET, FILM COATED ORAL EVERY MORNING
Status: SHIPPED | COMMUNITY
Start: 2022-06-23

## 2022-06-23 RX ORDER — AMLODIPINE BESYLATE 10 MG/1
10 TABLET ORAL EVERY MORNING
Status: SHIPPED | COMMUNITY
Start: 2022-06-23

## 2022-06-23 NOTE — PLAN OF CARE
Post-op day #1. Dressing in place to anterior neck. DAMIR drain removed this afternoon per order. Output recorded. Spine precautions in place. Monitoring vital signs- stable at this time. Remote tele in place, no calls. No acute changes noted throughout shift. Monitoring blood glucose levels per order. Sugar elevated at lunch, likely due to decadron yesterday. MD notified. Tolerating diet. Patient notes some pain when swallowing but no choking. PA is aware. Voiding freely. SCDs and teds for DVT prophylaxis. Patient does not complain of pain requiring pain medications. Up with standby assist and a walker. Encouraged frequent ambulation and use of incentive spirometer. Fall precautions maintained- bed locked in lowest position, call light and personal belongings within reach, non-skid socks in place to bilateral feet. Frequent rounding by nursing staff. Patient cleared by internal medicine, ortho surgery, PT/OT, and social work. Going home with his wife. Verified that pain medications are at patient's pharmacy. IV removed, discharge education provided, patient sent home with all personal belongings (CPAP mask) and discharge instructions. Addressed additional questions. Patient's wife here to take him home. Problem: Patient Centered Care  Goal: Patient preferences are identified and integrated in the patient's plan of care  Description: Interventions:  - What would you like us to know as we care for you?  I have a history of nerve damage on my left side  - Provide timely, complete, and accurate information to patient/family  - Incorporate patient and family knowledge, values, beliefs, and cultural backgrounds into the planning and delivery of care  - Encourage patient/family to participate in care and decision-making at the level they choose  - Honor patient and family perspectives and choices  Outcome: Adequate for Discharge     Problem: Patient/Family Goals  Goal: Patient/Family Long Term Goal  Description: Patient's Long Term Goal: Go home    Interventions:  - follow MD orders  - PT/OT  - discharge planning  - update patient and family on plan of care  - See additional Care Plan goals for specific interventions  Outcome: Adequate for Discharge  Goal: Patient/Family Short Term Goal  Description: Patient's Short Term Goal: Pain < 4    Interventions:   - follow MD orders  - PT/OT  - spine precautions  - ambulate as tolerated  - pain medications as needed  - non-pharmacologic pain interventions  - See additional Care Plan goals for specific interventions  Outcome: Adequate for Discharge     Problem: PAIN - ADULT  Goal: Verbalizes/displays adequate comfort level or patient's stated pain goal  Description: INTERVENTIONS:  - Encourage pt to monitor pain and request assistance  - Assess pain using appropriate pain scale  - Administer analgesics based on type and severity of pain and evaluate response  - Implement non-pharmacological measures as appropriate and evaluate response  - Consider cultural and social influences on pain and pain management  - Manage/alleviate anxiety  - Utilize distraction and/or relaxation techniques  - Monitor for opioid side effects  - Notify MD/LIP if interventions unsuccessful or patient reports new pain  - Anticipate increased pain with activity and pre-medicate as appropriate  Outcome: Adequate for Discharge     Problem: RISK FOR INFECTION - ADULT  Goal: Absence of fever/infection during anticipated neutropenic period  Description: INTERVENTIONS  - Monitor WBC  - Administer growth factors as ordered  - Implement neutropenic guidelines  Outcome: Adequate for Discharge     Problem: SAFETY ADULT - FALL  Goal: Free from fall injury  Description: INTERVENTIONS:  - Assess pt frequently for physical needs  - Identify cognitive and physical deficits and behaviors that affect risk of falls.   - [place] fall precautions as indicated by assessment.  - Educate pt/family on patient safety including physical limitations  - Instruct pt to call for assistance with activity based on assessment  - Modify environment to reduce risk of injury  - Provide assistive devices as appropriate  - Consider OT/PT consult to assist with strengthening/mobility  - Encourage toileting schedule  Outcome: Adequate for Discharge     Problem: DISCHARGE PLANNING  Goal: Discharge to home or other facility with appropriate resources  Description: INTERVENTIONS:  - Identify barriers to discharge w/pt and caregiver  - Include patient/family/discharge partner in discharge planning  - Arrange for needed discharge resources and transportation as appropriate  - Identify discharge learning needs (meds, wound care, etc)  - Arrange for interpreters to assist at discharge as needed  - Consider post-discharge preferences of patient/family/discharge partner  - Complete POLST form as appropriate  - Assess patient's ability to be responsible for managing their own health  - Refer to Case Management Department for coordinating discharge planning if the patient needs post-hospital services based on physician/LIP order or complex needs related to functional status, cognitive ability or social support system  Outcome: Adequate for Discharge     Problem: Diabetes/Glucose Control  Goal: Glucose maintained within prescribed range  Description: INTERVENTIONS:  - Monitor Blood Glucose as ordered  - Assess for signs and symptoms of hyperglycemia and hypoglycemia  - Administer ordered medications to maintain glucose within target range  - Assess barriers to adequate nutritional intake and initiate nutrition consult as needed  - Instruct patient on self management of diabetes  Outcome: Adequate for Discharge     Problem: Impaired Functional Mobility  Goal: Achieve highest/safest level of mobility/gait  Description: Interventions:  - Assess patient's functional ability and stability  - Promote increasing activity/tolerance for mobility and gait  - Educate and engage patient/family in tolerated activity level and precautions  Outcome: Adequate for Discharge

## 2022-06-23 NOTE — PLAN OF CARE
VSS, no acute events overnight. Pt is aox4, ambulating with wife in room. Voiding freely per urinal. SCD's and jermain hose for DVT prophylaxis. Dressing gauze and tegaderm, CDI. Dilaudid and norco available for pain, though pt reports pain is tolerable. Pt plans to d/c home with wife today pending drain removal. Disease process discussed with pt. Bed in lowest position, call light and personal possessions within reach. Pt instructed to call for assistance before getting up. Problem: Patient Centered Care  Goal: Patient preferences are identified and integrated in the patient's plan of care  Description: Interventions:  - What would you like us to know as we care for you?  I have a history of nerve damage on my left side  - Provide timely, complete, and accurate information to patient/family  - Incorporate patient and family knowledge, values, beliefs, and cultural backgrounds into the planning and delivery of care  - Encourage patient/family to participate in care and decision-making at the level they choose  - Honor patient and family perspectives and choices  Outcome: Progressing     Problem: Patient/Family Goals  Goal: Patient/Family Long Term Goal  Description: Patient's Long Term Goal: Go home    Interventions:  - Work with physical/occupational therapy, administer medications as ordered, follow plan of care  - See additional Care Plan goals for specific interventions  Outcome: Progressing  Goal: Patient/Family Short Term Goal  Description: Patient's Short Term Goal: Pain < 4    Interventions:   - Administer medications as ordered, utilize non-pharm pain management strategies such as positioning and ice therapy  - See additional Care Plan goals for specific interventions  Outcome: Progressing     Problem: PAIN - ADULT  Goal: Verbalizes/displays adequate comfort level or patient's stated pain goal  Description: INTERVENTIONS:  - Encourage pt to monitor pain and request assistance  - Assess pain using appropriate pain scale  - Administer analgesics based on type and severity of pain and evaluate response  - Implement non-pharmacological measures as appropriate and evaluate response  - Consider cultural and social influences on pain and pain management  - Manage/alleviate anxiety  - Utilize distraction and/or relaxation techniques  - Monitor for opioid side effects  - Notify MD/LIP if interventions unsuccessful or patient reports new pain  - Anticipate increased pain with activity and pre-medicate as appropriate  Outcome: Progressing     Problem: RISK FOR INFECTION - ADULT  Goal: Absence of fever/infection during anticipated neutropenic period  Description: INTERVENTIONS  - Monitor WBC  - Administer growth factors as ordered  - Implement neutropenic guidelines  Outcome: Progressing     Problem: SAFETY ADULT - FALL  Goal: Free from fall injury  Description: INTERVENTIONS:  - Assess pt frequently for physical needs  - Identify cognitive and physical deficits and behaviors that affect risk of falls.   - [place] fall precautions as indicated by assessment.  - Educate pt/family on patient safety including physical limitations  - Instruct pt to call for assistance with activity based on assessment  - Modify environment to reduce risk of injury  - Provide assistive devices as appropriate  - Consider OT/PT consult to assist with strengthening/mobility  - Encourage toileting schedule  Outcome: Progressing     Problem: DISCHARGE PLANNING  Goal: Discharge to home or other facility with appropriate resources  Description: INTERVENTIONS:  - Identify barriers to discharge w/pt and caregiver  - Include patient/family/discharge partner in discharge planning  - Arrange for needed discharge resources and transportation as appropriate  - Identify discharge learning needs (meds, wound care, etc)  - Arrange for interpreters to assist at discharge as needed  - Consider post-discharge preferences of patient/family/discharge partner  - Complete POLST form as appropriate  - Assess patient's ability to be responsible for managing their own health  - Refer to Case Management Department for coordinating discharge planning if the patient needs post-hospital services based on physician/LIP order or complex needs related to functional status, cognitive ability or social support system  Outcome: Progressing

## 2022-06-26 NOTE — OPERATIVE REPORT
Operative Note     Patient Name: Zana Lin  Preoperative Diagnosis:   1.) Diffuse Idiopathic Skeletal Hyperostosis   2.) Dysphagia  Postoperative Diagnosis: Same  Primary Surgeon: Regla Diaz MD  Co-Surgeon:  Rina Hammond MD  Assistant: Esteban Qureshi PA-C  Procedures:   1.) Anterior Osteophytectomy C2   CPT 52902-51,-22  2.) Anterior Osteophytectomy C3   CPT 10856  3.) Anterior Osteophytectomy C4   CPT 09660  4.) Anterior Osteophytectomy C5   CPT 21335    Surgical Findings:  1.) Advanced, large compressive osteophytes anterior to the C2-C5 Bodies, small osteophytes anterior to C6-7  Anesthesia: General Endotracheal Anesthesia  Estimated Blood Loss: 50cc  Urine Output : n/a  Drains: DAMIR Drain  Implants: None  Specimen: None  Condition: Stable  Complications: None    Surgical Indications:   Zana Lin is an 59year old male who presented with a history of dysphagia without neck, upper or lower extremity symptoms. He had extensive workup for his dysphagia with ENT/GI. Workup included radiographs and axial imaging. The patient was diagnosed with DISH and Dysphagia. The risks, benefits, and alternatives were discussed. Risks include, but are not limited wound complications, bleeding, infection, neurologic injury or onset of a new neurological issue including paralysis,reformation of anterior osteophytes, fracture, adjacent segment or junctional breakdown, recurrent laryngeal nerve palsy, Piper's syndrome, persistent or worsened dysphagia or dysphonia,, hematoma requiring evacuation, repeat intubation after surgery, esophageal or tracheal injury, vertebral artery injury (stroke), and the need for possible additional future surgery. We also discussed the possible persistence of symptoms and adjacent segment degeneration. The patient verbalized their understanding and wished to proceed with surgery. Surgical Procedure:    The patient was met in the preop holding area, we reviewed elements of the patient's history and physical examination along with the planned surgical procedure. The patient was in agreement and the surgical site was marked with indelible ink. The patient was brought back to the operative theater. The patient was administered prophylactic antibiotics per SCIP guidelines. After successful induction of general endotracheal anesthesia, sequential compression devices were applied to bilateral lower extremities. The patient was then positioned supine on the operating table. The head was positioned carefully on a padded circular head rest. The patient's orbits, peripheral nerves, and bony prominences were padded and protected. Using a surgical instrument a radiograph was taken to localize the skin incision relative to the patient's anatomy. The neck was then prepped and draped in the usual sterile fashion. A safety timeout was performed identifying the patient by name, MRN, and date of birth; the nature of the procedure, surgical site, and concerns were addressed with the operating room staff. All were in agreement and we proceeded with the procedure. A transverse incision was made at the C3/4 level on the left. Please refer to the report of Dr. Rosa Quiroz for the approach. This approach was more extensile with a more aggressive dissection to accommodate the extensile approach to visualized C2-5 in the setting of a patient with advanced dysphagia so that there was not untoward rension placed on the soft tissue during the surgery. This require approximately 30-40 minutes compared to a soft tissue dissection which typically takes 7-10 minutes. Hence the -22 modifier. After exposing the prevertebral fascia, radiographic marker was placed in the C4/5 disc space. A radiograph was then obtained to confirm the appropriate surgical level(s) prior to proceeding with the procedure.   Referencing off the radiograph, edges of the longus coli muscles elevated from their undersurface with bipolar electrocautery. Using a combination of rongeurs, hish speed belén, the ostephytes were significantly debulked and the prominences were substantially reduced. Care was taken no to violate the disc spaces and destabilized the spine. There was bleeding from the exposed cancellous bone. This was controlled with wax. A DAMIR drain was placed. All bleeders were meticulously controlled using bipolar and unipolar electrocautery. The wound was thoroughly irrigated at the time of closure. There was no active bleeding. 1cc of 40mg Kenalog was placed in the retropharyngeal space in a flowseal matrix. Closure was done in layers with interrupted 2-0 Vicryl for the fascia. The skin was closed with a Monocryl suture. Steri-Strips and a sterile dressing were then applied. The patient was woken from anesthesia and transferred to the PACU in stable condition. All counts were correct x2 at the end of the procedure. A Physician assistant was necessary during the case to provide retraction and to manage wound suction and assist with closure. Gerber Lombardo.  Celia Bryant MD  Division of Spine Surgery  [facility]

## 2022-07-12 ENCOUNTER — PATIENT OUTREACH (OUTPATIENT)
Dept: CASE MANAGEMENT | Age: 57
End: 2022-07-12

## 2022-07-12 NOTE — PROGRESS NOTES
1st attempt to review DM f/u questions    Diabetic Outreach D/C Follow Up Questions:     1. Did you receive the information provided during your hospitalization and at discharge about diabetes? yes    If No:  Would you like us to mail you the information? no   If Yes:  Was the information helpful? Yes     2. Were there any changes made to your medications? no            3. Do you have all of your diabetes pills and or insulin now that you are home? Yes  If yes:  do you understand the changes made? yes      4. Are you confident in managing your diabetes? yes     5. Did you make the necessary transportation arrangements to get to your diabetes follow-up appointment? Yes         If pt answers No to questions #3 and #4 Advise pt you will have a clinical person contact them to address.

## 2024-05-14 ENCOUNTER — APPOINTMENT (OUTPATIENT)
Dept: GENERAL RADIOLOGY | Facility: HOSPITAL | Age: 59
DRG: 871 | End: 2024-05-14
Attending: EMERGENCY MEDICINE

## 2024-05-14 ENCOUNTER — APPOINTMENT (OUTPATIENT)
Dept: CT IMAGING | Facility: HOSPITAL | Age: 59
DRG: 871 | End: 2024-05-14
Attending: EMERGENCY MEDICINE

## 2024-05-14 ENCOUNTER — HOSPITAL ENCOUNTER (INPATIENT)
Facility: HOSPITAL | Age: 59
LOS: 8 days | Discharge: HOME HEALTH | DRG: 871 | End: 2024-05-22
Attending: EMERGENCY MEDICINE | Admitting: INTERNAL MEDICINE

## 2024-05-14 DIAGNOSIS — A41.9 SEPSIS, DUE TO UNSPECIFIED ORGANISM, UNSPECIFIED WHETHER ACUTE ORGAN DYSFUNCTION PRESENT (HCC): Primary | ICD-10-CM

## 2024-05-14 DIAGNOSIS — J18.9 PNEUMONIA DUE TO INFECTIOUS ORGANISM, UNSPECIFIED LATERALITY, UNSPECIFIED PART OF LUNG: ICD-10-CM

## 2024-05-14 DIAGNOSIS — R65.21 SEPTIC SHOCK (HCC): ICD-10-CM

## 2024-05-14 DIAGNOSIS — A41.9 SEPTIC SHOCK (HCC): ICD-10-CM

## 2024-05-14 DIAGNOSIS — E87.20 METABOLIC ACIDOSIS: ICD-10-CM

## 2024-05-14 DIAGNOSIS — N13.30 HYDRONEPHROSIS, UNSPECIFIED HYDRONEPHROSIS TYPE: ICD-10-CM

## 2024-05-14 PROBLEM — R79.89 AZOTEMIA: Status: ACTIVE | Noted: 2024-05-14

## 2024-05-14 PROBLEM — D64.9 ANEMIA: Status: ACTIVE | Noted: 2024-05-14

## 2024-05-14 PROBLEM — R73.9 HYPERGLYCEMIA: Status: ACTIVE | Noted: 2024-05-14

## 2024-05-14 PROBLEM — D72.829 LEUKOCYTOSIS: Status: ACTIVE | Noted: 2024-05-14

## 2024-05-14 LAB
ALBUMIN SERPL-MCNC: 2.2 G/DL (ref 3.4–5)
ALBUMIN SERPL-MCNC: 2.5 G/DL (ref 3.4–5)
ALBUMIN/GLOB SERPL: 0.4 {RATIO} (ref 1–2)
ALBUMIN/GLOB SERPL: 0.4 {RATIO} (ref 1–2)
ALP LIVER SERPL-CCNC: 103 U/L
ALP LIVER SERPL-CCNC: 110 U/L
ALT SERPL-CCNC: 38 U/L
ALT SERPL-CCNC: 40 U/L
ANION GAP SERPL CALC-SCNC: 10 MMOL/L (ref 0–18)
ANION GAP SERPL CALC-SCNC: 9 MMOL/L (ref 0–18)
ANION GAP SERPL CALC-SCNC: 9 MMOL/L (ref 0–18)
APTT PPP: 33 SECONDS (ref 23–36)
AST SERPL-CCNC: 18 U/L (ref 15–37)
AST SERPL-CCNC: 20 U/L (ref 15–37)
ATRIAL RATE: 115 BPM
BASOPHILS # BLD AUTO: 0.03 X10(3) UL (ref 0–0.2)
BASOPHILS # BLD AUTO: 0.03 X10(3) UL (ref 0–0.2)
BASOPHILS NFR BLD AUTO: 0.1 %
BASOPHILS NFR BLD AUTO: 0.2 %
BILIRUB SERPL-MCNC: 0.2 MG/DL (ref 0.1–2)
BILIRUB SERPL-MCNC: 0.4 MG/DL (ref 0.1–2)
BILIRUB UR QL STRIP.AUTO: NEGATIVE
BUN BLD-MCNC: 38 MG/DL (ref 9–23)
BUN BLD-MCNC: 41 MG/DL (ref 9–23)
BUN BLD-MCNC: 57 MG/DL (ref 9–23)
CALCIUM BLD-MCNC: 8.4 MG/DL (ref 8.5–10.1)
CALCIUM BLD-MCNC: 8.9 MG/DL (ref 8.5–10.1)
CALCIUM BLD-MCNC: 9.6 MG/DL (ref 8.5–10.1)
CHLORIDE SERPL-SCNC: 115 MMOL/L (ref 98–112)
CHLORIDE SERPL-SCNC: 118 MMOL/L (ref 98–112)
CHLORIDE SERPL-SCNC: 120 MMOL/L (ref 98–112)
CO2 SERPL-SCNC: 10 MMOL/L (ref 21–32)
CO2 SERPL-SCNC: 14 MMOL/L (ref 21–32)
CO2 SERPL-SCNC: 9 MMOL/L (ref 21–32)
COLOR UR AUTO: COLORLESS
CREAT BLD-MCNC: 1.43 MG/DL
CREAT BLD-MCNC: 1.45 MG/DL
CREAT BLD-MCNC: 1.84 MG/DL
EGFRCR SERPLBLD CKD-EPI 2021: 41 ML/MIN/1.73M2 (ref 60–?)
EGFRCR SERPLBLD CKD-EPI 2021: 55 ML/MIN/1.73M2 (ref 60–?)
EGFRCR SERPLBLD CKD-EPI 2021: 56 ML/MIN/1.73M2 (ref 60–?)
EOSINOPHIL # BLD AUTO: 0 X10(3) UL (ref 0–0.7)
EOSINOPHIL # BLD AUTO: 0 X10(3) UL (ref 0–0.7)
EOSINOPHIL NFR BLD AUTO: 0 %
EOSINOPHIL NFR BLD AUTO: 0 %
ERYTHROCYTE [DISTWIDTH] IN BLOOD BY AUTOMATED COUNT: 13.7 %
ERYTHROCYTE [DISTWIDTH] IN BLOOD BY AUTOMATED COUNT: 14 %
FLUAV + FLUBV RNA SPEC NAA+PROBE: NEGATIVE
FLUAV + FLUBV RNA SPEC NAA+PROBE: NEGATIVE
GLOBULIN PLAS-MCNC: 5.4 G/DL (ref 2.8–4.4)
GLOBULIN PLAS-MCNC: 5.8 G/DL (ref 2.8–4.4)
GLUCOSE BLD-MCNC: 200 MG/DL (ref 70–99)
GLUCOSE BLD-MCNC: 242 MG/DL (ref 70–99)
GLUCOSE BLD-MCNC: 260 MG/DL (ref 70–99)
GLUCOSE BLD-MCNC: 275 MG/DL (ref 70–99)
GLUCOSE BLD-MCNC: 280 MG/DL (ref 70–99)
GLUCOSE UR STRIP.AUTO-MCNC: >1000 MG/DL
HCT VFR BLD AUTO: 27.4 %
HCT VFR BLD AUTO: 30.7 %
HGB BLD-MCNC: 10.5 G/DL
HGB BLD-MCNC: 9.3 G/DL
HYALINE CASTS #/AREA URNS AUTO: PRESENT /LPF
IMM GRANULOCYTES # BLD AUTO: 0.11 X10(3) UL (ref 0–1)
IMM GRANULOCYTES # BLD AUTO: 0.2 X10(3) UL (ref 0–1)
IMM GRANULOCYTES NFR BLD: 0.6 %
IMM GRANULOCYTES NFR BLD: 0.9 %
INR BLD: 1.16 (ref 0.8–1.2)
KETONES UR STRIP.AUTO-MCNC: NEGATIVE MG/DL
LACTATE SERPL-SCNC: 1.1 MMOL/L (ref 0.4–2)
LACTATE SERPL-SCNC: 1.2 MMOL/L (ref 0.4–2)
LEUKOCYTE ESTERASE UR QL STRIP.AUTO: 500
LYMPHOCYTES # BLD AUTO: 0.16 X10(3) UL (ref 1–4)
LYMPHOCYTES # BLD AUTO: 0.3 X10(3) UL (ref 1–4)
LYMPHOCYTES NFR BLD AUTO: 0.7 %
LYMPHOCYTES NFR BLD AUTO: 1.5 %
MCH RBC QN AUTO: 28.4 PG (ref 26–34)
MCH RBC QN AUTO: 28.6 PG (ref 26–34)
MCHC RBC AUTO-ENTMCNC: 33.9 G/DL (ref 31–37)
MCHC RBC AUTO-ENTMCNC: 34.2 G/DL (ref 31–37)
MCV RBC AUTO: 83.7 FL
MCV RBC AUTO: 83.8 FL
MONOCYTES # BLD AUTO: 0.6 X10(3) UL (ref 0.1–1)
MONOCYTES # BLD AUTO: 1.07 X10(3) UL (ref 0.1–1)
MONOCYTES NFR BLD AUTO: 3 %
MONOCYTES NFR BLD AUTO: 4.6 %
MRSA DNA SPEC QL NAA+PROBE: POSITIVE
NEUTROPHILS # BLD AUTO: 18.71 X10 (3) UL (ref 1.5–7.7)
NEUTROPHILS # BLD AUTO: 18.71 X10(3) UL (ref 1.5–7.7)
NEUTROPHILS # BLD AUTO: 21.86 X10 (3) UL (ref 1.5–7.7)
NEUTROPHILS # BLD AUTO: 21.86 X10(3) UL (ref 1.5–7.7)
NEUTROPHILS NFR BLD AUTO: 93.7 %
NEUTROPHILS NFR BLD AUTO: 94.7 %
NITRITE UR QL STRIP.AUTO: NEGATIVE
OSMOLALITY SERPL CALC.SUM OF ELEC: 305 MOSM/KG (ref 275–295)
OSMOLALITY SERPL CALC.SUM OF ELEC: 305 MOSM/KG (ref 275–295)
OSMOLALITY SERPL CALC.SUM OF ELEC: 310 MOSM/KG (ref 275–295)
P AXIS: 21 DEGREES
P-R INTERVAL: 198 MS
PH UR STRIP.AUTO: 5.5 [PH] (ref 5–8)
PLATELET # BLD AUTO: 214 10(3)UL (ref 150–450)
PLATELET # BLD AUTO: 244 10(3)UL (ref 150–450)
POTASSIUM SERPL-SCNC: 3 MMOL/L (ref 3.5–5.1)
POTASSIUM SERPL-SCNC: 3.7 MMOL/L (ref 3.5–5.1)
POTASSIUM SERPL-SCNC: 4.2 MMOL/L (ref 3.5–5.1)
PROT SERPL-MCNC: 7.6 G/DL (ref 6.4–8.2)
PROT SERPL-MCNC: 8.3 G/DL (ref 6.4–8.2)
PROT UR STRIP.AUTO-MCNC: 20 MG/DL
PROTHROMBIN TIME: 14.9 SECONDS (ref 11.6–14.8)
Q-T INTERVAL: 328 MS
QRS DURATION: 120 MS
QTC CALCULATION (BEZET): 453 MS
R AXIS: -52 DEGREES
RBC # BLD AUTO: 3.27 X10(6)UL
RBC # BLD AUTO: 3.67 X10(6)UL
RSV RNA SPEC NAA+PROBE: NEGATIVE
SARS-COV-2 RNA RESP QL NAA+PROBE: NOT DETECTED
SODIUM SERPL-SCNC: 137 MMOL/L (ref 136–145)
SODIUM SERPL-SCNC: 138 MMOL/L (ref 136–145)
SODIUM SERPL-SCNC: 139 MMOL/L (ref 136–145)
SP GR UR STRIP.AUTO: 1.01 (ref 1–1.03)
STAPHYLOCOCCUS AUREUS, MRSA BY PCR: DETECTED
T AXIS: 64 DEGREES
TROPONIN I SERPL HS-MCNC: 12 NG/L
TROPONIN I SERPL HS-MCNC: 19 NG/L
UROBILINOGEN UR STRIP.AUTO-MCNC: NORMAL MG/DL
VENTRICULAR RATE: 115 BPM
WBC # BLD AUTO: 19.8 X10(3) UL (ref 4–11)
WBC # BLD AUTO: 23.3 X10(3) UL (ref 4–11)
WBC #/AREA URNS AUTO: >50 /HPF

## 2024-05-14 PROCEDURE — 70450 CT HEAD/BRAIN W/O DYE: CPT | Performed by: EMERGENCY MEDICINE

## 2024-05-14 PROCEDURE — 71275 CT ANGIOGRAPHY CHEST: CPT | Performed by: EMERGENCY MEDICINE

## 2024-05-14 PROCEDURE — 5A09357 ASSISTANCE WITH RESPIRATORY VENTILATION, LESS THAN 24 CONSECUTIVE HOURS, CONTINUOUS POSITIVE AIRWAY PRESSURE: ICD-10-PCS | Performed by: HOSPITALIST

## 2024-05-14 PROCEDURE — 02HV33Z INSERTION OF INFUSION DEVICE INTO SUPERIOR VENA CAVA, PERCUTANEOUS APPROACH: ICD-10-PCS | Performed by: EMERGENCY MEDICINE

## 2024-05-14 PROCEDURE — 71045 X-RAY EXAM CHEST 1 VIEW: CPT | Performed by: EMERGENCY MEDICINE

## 2024-05-14 PROCEDURE — B548ZZA ULTRASONOGRAPHY OF SUPERIOR VENA CAVA, GUIDANCE: ICD-10-PCS | Performed by: EMERGENCY MEDICINE

## 2024-05-14 PROCEDURE — 3E033XZ INTRODUCTION OF VASOPRESSOR INTO PERIPHERAL VEIN, PERCUTANEOUS APPROACH: ICD-10-PCS | Performed by: EMERGENCY MEDICINE

## 2024-05-14 PROCEDURE — 74177 CT ABD & PELVIS W/CONTRAST: CPT | Performed by: EMERGENCY MEDICINE

## 2024-05-14 RX ORDER — NICOTINE POLACRILEX 4 MG
30 LOZENGE BUCCAL
Status: DISCONTINUED | OUTPATIENT
Start: 2024-05-14 | End: 2024-05-22

## 2024-05-14 RX ORDER — VANCOMYCIN 1.75 GRAM/500 ML IN 0.9 % SODIUM CHLORIDE INTRAVENOUS
25 ONCE
Status: COMPLETED | OUTPATIENT
Start: 2024-05-15 | End: 2024-05-15

## 2024-05-14 RX ORDER — SENNOSIDES 8.6 MG
17.2 TABLET ORAL NIGHTLY PRN
Status: DISCONTINUED | OUTPATIENT
Start: 2024-05-14 | End: 2024-05-22

## 2024-05-14 RX ORDER — ACETAMINOPHEN 10 MG/ML
1000 INJECTION, SOLUTION INTRAVENOUS EVERY 6 HOURS PRN
Status: DISCONTINUED | OUTPATIENT
Start: 2024-05-14 | End: 2024-05-16

## 2024-05-14 RX ORDER — ONDANSETRON 2 MG/ML
4 INJECTION INTRAMUSCULAR; INTRAVENOUS EVERY 6 HOURS PRN
Status: DISCONTINUED | OUTPATIENT
Start: 2024-05-14 | End: 2024-05-22

## 2024-05-14 RX ORDER — HEPARIN SODIUM 5000 [USP'U]/ML
5000 INJECTION, SOLUTION INTRAVENOUS; SUBCUTANEOUS EVERY 8 HOURS SCHEDULED
Status: DISCONTINUED | OUTPATIENT
Start: 2024-05-14 | End: 2024-05-14

## 2024-05-14 RX ORDER — POTASSIUM CHLORIDE 14.9 MG/ML
20 INJECTION INTRAVENOUS ONCE
Status: COMPLETED | OUTPATIENT
Start: 2024-05-14 | End: 2024-05-15

## 2024-05-14 RX ORDER — ACETAMINOPHEN 500 MG
1000 TABLET ORAL ONCE
Status: COMPLETED | OUTPATIENT
Start: 2024-05-14 | End: 2024-05-14

## 2024-05-14 RX ORDER — ENOXAPARIN SODIUM 100 MG/ML
40 INJECTION SUBCUTANEOUS DAILY
Status: DISCONTINUED | OUTPATIENT
Start: 2024-05-15 | End: 2024-05-19

## 2024-05-14 RX ORDER — MELATONIN
3 NIGHTLY PRN
Status: DISCONTINUED | OUTPATIENT
Start: 2024-05-14 | End: 2024-05-22

## 2024-05-14 RX ORDER — INSULIN GLARGINE-YFGN 100 [IU]/ML
20 INJECTION, SOLUTION SUBCUTANEOUS NIGHTLY
COMMUNITY
Start: 2024-04-11

## 2024-05-14 RX ORDER — PROCHLORPERAZINE EDISYLATE 5 MG/ML
5 INJECTION INTRAMUSCULAR; INTRAVENOUS EVERY 8 HOURS PRN
Status: DISCONTINUED | OUTPATIENT
Start: 2024-05-14 | End: 2024-05-22

## 2024-05-14 RX ORDER — POLYETHYLENE GLYCOL 3350 17 G/17G
17 POWDER, FOR SOLUTION ORAL DAILY PRN
Status: DISCONTINUED | OUTPATIENT
Start: 2024-05-14 | End: 2024-05-22

## 2024-05-14 RX ORDER — ENEMA 19; 7 G/133ML; G/133ML
1 ENEMA RECTAL ONCE AS NEEDED
Status: DISCONTINUED | OUTPATIENT
Start: 2024-05-14 | End: 2024-05-22

## 2024-05-14 RX ORDER — NICOTINE POLACRILEX 4 MG
15 LOZENGE BUCCAL
Status: DISCONTINUED | OUTPATIENT
Start: 2024-05-14 | End: 2024-05-22

## 2024-05-14 RX ORDER — DEXTROSE MONOHYDRATE 25 G/50ML
50 INJECTION, SOLUTION INTRAVENOUS
Status: DISCONTINUED | OUTPATIENT
Start: 2024-05-14 | End: 2024-05-22

## 2024-05-14 RX ORDER — BISACODYL 10 MG
10 SUPPOSITORY, RECTAL RECTAL
Status: DISCONTINUED | OUTPATIENT
Start: 2024-05-14 | End: 2024-05-22

## 2024-05-14 RX ORDER — ACETAMINOPHEN 325 MG/1
650 TABLET ORAL EVERY 6 HOURS PRN
Status: DISCONTINUED | OUTPATIENT
Start: 2024-05-14 | End: 2024-05-22

## 2024-05-14 NOTE — PLAN OF CARE
Patient remains AO x 4, follows commands, able to express needs. Able to wean off pressor support, see MAR for titrations.  SBP > 90/MAP>65.  SR with borderline 1degree block and BBB, HR 80's - 110's.  Tachy when febriole, Max T 101.8.  2 grams tylenol given for fever.  Rodriguez placed in ER, 3650 ml out, yellow/sediment.  Spouse at bedside.

## 2024-05-14 NOTE — CONSULTS
[facility]  Department of Urology   Consultation Note    Yunior Garrett Patient Status:  Emergency    1965 MRN [id number]   [facility] EMERGENCY DEPARTMENT Attending Max Puentes MD   Hosp Day # 0 PCP Antione Valadez DO     Reason for Consultation:  Hydronephrosis/hydroureter  Recent cystectomy/neobladder    History of Present Illness:  Yunior Garrett is a a(n) 59 year old male with PMH of T2DM, HTN, HLD, CAD s/p PCI x3 (2019), idiopathic cardiomyopathy, sarcoidosis of lung, melanoma of shoulder, anemia of chronic illness, chronic low back pain s/p spinal neurostimulator, left-sided hemidiaphragmatic paralysis     Patient is followed by Dr. Ramiro Yost at [facility].  History of BCG unresponsive T1/Tcis bladder cancer status post radical cystectomy orthotopic neobladder on 3/14/24. Underwent radical cystectomy with neobladder creation 3/14/24.    Findings: The patient has a history of BCG unresponsive HG NMIBC, at surgery there were no obvious abnormal lymph nodes or extravesical disease.     The final pathology revealed pT0N0    Pathology 3/14/24:  FINAL DIAGNOSIS   A.  LYMPH NODES, RIGHT PELVIC, DISSECTION:    - Seven lymph nodes, negative for neoplasm (0/7)   B.  LYMPH NODES, LEFT PELVIC, DISSECTION:   - Four lymph nodes, negative for neoplasm (0/4)   C.  BLADDER AND PROSTATE, RADICAL CYSTOPROSTATECTOMY:    - Nephrogenic adenoma in a background of ulcerated urothelium with acute and   chronic inflammation, cystitis cystica, multinucleated giant cells, and   granulation tissue, negative for residual malignancy   - Margins negative for neoplasm   - Prostate negative for neoplasm   - Seminal vesicles and vas deferens, bilateral, negative for neoplasm   - Two lymph nodes, negative for neoplasm (0/2)   - Pathologic stage: ypT0 N0   - See note   NOTE   C. There are small, cytologically bland-appearing glands within the submucosa   which are positive for PAX8  and CK8/18 by immunohistochemistry, and negative   for p63 and GATA3, consistent with nephrogenic adenoma. Ki-67 proliferation in   this area is low (<10%).   The patient's history of neoadjuvantly treated urothelial carcinoma is noted   (slides not reviewed at [facility]). The background of inflammation and   multinucleated giant cell reaction present within the entirely-submitted areas   of scarring is consistent with treatment effect.   This case has undergone intradepartmental peer review and has been reviewed at    the daily intradepartmental  meeting with agreement on the   interpretation.     Patient presented with fatigue, weakness, SOB, post fall out of bed.  Fever/chills  Hypotension    Tmax 101.7 so far today, most recent temp 101  +chills    On levo    Labs:  Lactic acid 1.2  WBC 23.3  Hgb 10.5  Platelet count 244  Glucose 280  Sodium 137  Potassium 4.2  Chloride 118  Carbon dioxide 10  BUN 57  Serum creat 1.84    UA 5/14/24: >50 WBC/hpf, 6-10 RBC/hpf, rare bacteria  Urine culture 5/14/24: pending    2./2 blood cultures 5/14/24: pending    SARS-CoV-2/Flu A and B/RSV by PCR 5/14/24: negative    CXR 5/14/24:  FINDINGS:  Cardiac size and pulmonary vasculature are within normal limits. No pleural effusions. No pneumothorax.  Elevation of the right hemidiaphragm.  Linear atelectasis within the right lung base.  Spinal stimulator device noted.       CT brain 5/14/24:  CONCLUSION:  No acute process.     CTA chest, CT abdomen/pelvic 5/14/24:    Impression   CONCLUSION:       1. Pulmonary artery evaluation limited by bolus opacification, no large or central pulmonary artery embolism identified.     2. Multifocal inflammatory appearing changes, including multifocal areas of inflammatory appearing nodularity along, and reticular nodular interstitial changes.  Signs of airways inflammation are also present.  Concern for atypical infection.  No pleural   effusion or pneumothorax.     3. Reactive appearing  lymph nodes in the chest.     4. Bilateral hydronephrosis and hydroureter.  Moderately dilated irregular shaped urinary bladder with areas of wall thickening.  No bladder stone or bladder gas collections.  Urology referral for these findings advised.     5. Renal cysts are present, but in addition to defined measurable cysts, there are smaller poorly defined areas of low attenuation not typical for cyst, and could reflect areas of pyelonephritis.        Impression   CONCLUSION:       1. Pulmonary artery evaluation limited by bolus opacification, no large or central pulmonary artery embolism identified.     2. Multifocal inflammatory appearing changes, including multifocal areas of inflammatory appearing nodularity along, and reticular nodular interstitial changes.  Signs of airways inflammation are also present.  Concern for atypical infection.  No pleural   effusion or pneumothorax.     3. Reactive appearing lymph nodes in the chest.     4. Bilateral hydronephrosis and hydroureter.  Moderately dilated irregular shaped urinary bladder with areas of wall thickening.  No bladder stone or bladder gas collections.  Urology referral for these findings advised.     5. Renal cysts are present, but in addition to defined measurable cysts, there are smaller poorly defined areas of low attenuation not typical for cyst, and could reflect areas of pyelonephritis.     Transferred to ICU    Urology was consulted.    Dr. Palafox spoke with ER - recommended insertion of cardona catheter into neobladder. 1200 mL drained.      Patient reports he was voiding ok prior to admission.  Was not needing to cath at home.  No dysuria or gross hematuria.  No abdominal/flank pain.    Wife at bedside    History:  Past Medical History:    Back problem    Benign essential HTN    Calculus of kidney    Cancer (HCC)    Melanoma    Diabetes (HCC)    High blood pressure    High cholesterol    Mixed hyperlipidemia    Osteoarthritis    Problems with  swallowing    Renal disorder    CKD Stage2    Sarcoidosis    Sarcoidosis of lung (HCC)    Shortness of breath    Sleep apnea    Pt use CPAP at night    Type 2 diabetes mellitus without complication, without long-term current use of insulin (HCC)     Past Surgical History:   Procedure Laterality Date    Cath bare metal stent (bms)  10/2019    Colonoscopy N/A 9/29/2020    Procedure: COLONOSCOPY;  Surgeon: David Jo MD;  Location:  ENDOSCOPY    Other  07/2019    Left elbow surgery    Other surgical history  06/22/2022    Osteophytectomy cervical 2, cervical 3, cervical 4, cervical 5      Family History   Problem Relation Age of Onset    Diabetes Father     Hypertension Father     Heart Disorder Father     Lipids Father     Cancer Mother         Liver    Diabetes Mother     Heart Disorder Mother     Hypertension Mother     Lipids Mother       reports that he has never smoked. He has never used smokeless tobacco. He reports that he does not drink alcohol and does not use drugs.    Allergies:  No Known Allergies    Medications:    Current Facility-Administered Medications:     sodium chloride 0.9 % IV bolus 1,845 mL, 30 mL/kg (Ideal), Intravenous, Continuous    norepinephrine (Levophed) 4 mg/250mL infusion premix, 0.5-30 mcg/min, Intravenous, Continuous    sodium bicarbonate 150 mEq in dextrose 5% 1,000 mL infusion, 150 mEq, Intravenous, Continuous    Review of Systems:  Pertinent items are noted in HPI.    Physical Exam:  /66 (BP Location: Right arm)   Pulse 97   Temp (!) 101 °F (38.3 °C) (Temporal)   Resp 26   Ht 5' 5\" (1.651 m)   Wt 160 lb 0.9 oz (72.6 kg)   SpO2 100%   BMI 26.63 kg/m²   GENERAL: the patient is resting in bed, shaky.    HEENT: Unremarkable.  NECK: Supple.   LUNGS: non-labored respirations  ABDOMEN: The abdomen is soft and nontender without rebound or guarding.     BACK: Without CVA tenderness.   : cardona catheter in place  draining cloudy yellow urine     Laboratory  Data:  Lab Results   Component Value Date    WBC 23.3 05/14/2024    HGB 10.5 05/14/2024    HCT 30.7 05/14/2024    .0 05/14/2024    CREATSERUM 1.84 05/14/2024    BUN 57 05/14/2024     05/14/2024    K 4.2 05/14/2024     05/14/2024    CO2 10.0 05/14/2024     05/14/2024    CA 9.6 05/14/2024    ALB 2.5 05/14/2024    ALKPHO 110 05/14/2024    BILT 0.4 05/14/2024    TP 8.3 05/14/2024    AST 18 05/14/2024    ALT 40 05/14/2024    PTT 33.0 05/14/2024    INR 1.16 05/14/2024    PTP 14.9 05/14/2024     Lactic acid 1.2  WBC 23.3  Hgb 10.5  Platelet count 244  Glucose 280  Sodium 137  Potassium 4.2  Chloride 118  Carbon dioxide 10  BUN 57  Serum creat 1.84    UA 5/14/24: >50 WBC/hpf, 6-10 RBC/hpf, rare bacteria  Urine culture 5/14/24: pending    2./2 blood cultures 5/14/24: pending    SARS-CoV-2/Flu A and B/RSV by PCR 5/14/24: negative     Imaging:  CT BRAIN OR HEAD (07120)    Result Date: 5/14/2024  CONCLUSION:  No acute process.    LOCATION:  [facility]   Dictated by (CST): Tarun Castellanos MD on 5/14/2024 at 7:46 AM     Finalized by (CST): Tarun Casetllanos MD on 5/14/2024 at 7:46 AM       CTA CHEST + CT ABD (W) + CT PEL (W) SH(CPT=71275/50968)    Result Date: 5/14/2024  CONCLUSION:   1. Pulmonary artery evaluation limited by bolus opacification, no large or central pulmonary artery embolism identified.  2. Multifocal inflammatory appearing changes, including multifocal areas of inflammatory appearing nodularity along, and reticular nodular interstitial changes.  Signs of airways inflammation are also present.  Concern for atypical infection.  No pleural  effusion or pneumothorax.  3. Reactive appearing lymph nodes in the chest.  4. Bilateral hydronephrosis and hydroureter.  Moderately dilated irregular shaped urinary bladder with areas of wall thickening.  No bladder stone or bladder gas collections.  Urology referral for these findings advised.  5. Renal cysts are present, but in addition to defined  measurable cysts, there are smaller poorly defined areas of low attenuation not typical for cyst, and could reflect areas of pyelonephritis.    LOCATION:  [facility]   Dictated by (CST): Tarun Castellanos MD on 5/14/2024 at 7:37 AM     Finalized by (CST): Tarun Castellanos MD on 5/14/2024 at 7:45 AM       XR CHEST AP PORTABLE  (CPT=71045)    Result Date: 5/14/2024  CONCLUSION:  1. Minimal right basilar atelectasis. 2. Preliminary report was provided by [facility] at time of examination.  Final report confirms findings on preliminary report without discrepancy.    LOCATION:  [facility]      Dictated by (CST): Quinitn Rossi MD on 5/14/2024 at 7:40 AM     Finalized by (CST): Quintin Rossi MD on 5/14/2024 at 7:41 AM       Impression:  Patient Active Problem List   Diagnosis    Mixed hyperlipidemia    Type 2 diabetes mellitus without complication, without long-term current use of insulin (HCC)    CAD in native artery    Idiopathic cardiomyopathy (HCC)    Impotence of organic origin    Benign essential hypertension    Agatston coronary artery calcium score greater than 400    Sarcoidosis of lung (HCC)    Malignant melanoma of upper extremity, including shoulder, unspecified laterality (HCC)    Anemia of chronic illness    Dysphagia    Anemia    Azotemia    Leukocytosis    Hyperglycemia    Metabolic acidosis    Sepsis, due to unspecified organism, unspecified whether acute organ dysfunction present ([facility])     Shock    UTI    Bilateral hydronephrosis/hydroureter    ELENA    Tmax so far today 101.7  Lactic acid 1.2  WBC 23.3  Hgb 10.5  Platelet count 244  Glucose 280  Sodium 137  Potassium 4.2  Chloride 118  Carbon dioxide 10  BUN 57  Serum creat 1.84  UA 5/14/24: >50 WBC/hpf, 6-10 RBC/hpf, rare bacteria  Urine culture 5/14/24: pending  2./2 blood cultures 5/14/24: pending  SARS-CoV-2/Flu A and B/RSV by PCR 5/14/24: negative  CTA chest, CT abdomen/pelvis 5/14/24: Pulmonary artery evaluation limited by bolus opacification, no  large or central pulmonary artery embolism identified.  Multifocal inflammatory appearing changes, including multifocal areas of inflammatory appearing nodularity along, and reticular nodular interstitial changes.  Signs of airways inflammation are also present.  Concern for atypical infection.  No pleural effusion or pneumothorax.Reactive appearing lymph nodes in the chest.  Bilateral hydronephrosis and hydroureter.  Moderately dilated irregular shaped urinary bladder with areas of wall thickening.  No bladder stone or bladder gas collections. Urology referral for these findings advised. Renal cysts are present, but in addition to defined measurable cysts, there are smaller poorly defined areas of low attenuation not typical for cyst, and could reflect areas of pyelonephritis.    Cardona catheter placed - 1200 mL drained.      History of BCG unresponsive T1/Tcis bladder cancer   Status post radical cystectomy orthotopic neobladder on 3/14/24.     Recommendations:  Check final cultures  Abx per attending  Follow labs, temp, UOP  CPM cardona catheter  Follow-up renal US in 2-3 days  Further management per ICU team  Patient to follow-up with his established urologist (Dr. Yost) after discharge.      Above discussed with patient, wife, nurse, Dr. Palafox.    Thank you for allowing me to participate in the care of your patient.    Mar Griffiths PA-C  [facility]  Department of Urology  5/14/2024  11:30 AM

## 2024-05-14 NOTE — ED QUICK NOTES
Orders for admission, patient is aware of plan and ready to go upstairs. Any questions, please call ED RN Dl at extension [phone].     Patient Covid vaccination status: Fully vaccinated     COVID Test Ordered in ED: SARS-CoV-2/Flu A and B/RSV by PCR (GeneXpert)    COVID Suspicion at Admission: N/A    Running Infusions:    sodium chloride 1,845 mL (05/14/24 0556)        Mental Status/LOC at time of transport: A/Ox4    Other pertinent information:   CIWA score: N/A   NIH score:  N/A

## 2024-05-14 NOTE — ED PROVIDER NOTES
Patient Seen in: [facility] Emergency Department      History     Chief Complaint   Patient presents with    Difficulty Breathing    Fall    Fatigue     Stated Complaint: arrived via EMS for c/o weakness, post fall out of bed, (+) MAVIS, feeling fatigu*    Subjective:   HPI    This is a 59-year-old gentleman, history of bladder cancer status post resection of bladder cancer and creation of neobladder 2 months ago here for evaluation of 1 week of generalized weakness, some shortness of breath slight cough over the past few days.  States today while getting up felt weak, fell down onto his outstretched hands did not strike his head.  Denies any vomiting any diarrhea.  States he has been urinating every 2 hours as he was instructed after creation of his neobladder in order to train his bladder.  Denies any pain with urination any blood in the urine any other complaints or concerns at this time denies any rash, focal abdominal pain any other symptoms.  Objective:   No pertinent past medical history.            No pertinent past surgical history.              No pertinent social history.            Review of Systems    Positive for stated complaint: arrived via EMS for c/o weakness, post fall out of bed, (+) MAVIS, feeling fatigu*  Other systems are as noted in HPI.  Constitutional and vital signs reviewed.      All other systems reviewed and negative except as noted above.    Physical Exam     ED Triage Vitals [05/14/24 0454]   /67   Pulse 115   Resp (!) 34   Temp (!) 101.7 °F (38.7 °C)   Temp src Oral   SpO2 100 %   O2 Device None (Room air)       Current Vitals:   Vital Signs  BP: 130/78  Pulse: 103  Resp: (!) 35  Temp: 99.5 °F (37.5 °C)  Temp src: Temporal  MAP (mmHg): 88    Oxygen Therapy  SpO2: 100 %  O2 Device: None (Room air)  Pulse Oximetry Type: Continuous  Oximetry Probe Site Changed: No  Pulse Ox Probe Location: Right hand            Physical Exam        Physical Exam  Vitals signs and nursing note  reviewed.   General: Middle-age gentleman sitting up in bed appears fatigued.  Head: Normocephalic and atraumatic.   HEENT:  Mucous membranes are somewhat dry.  Cardiovascular:  Normal rate and regular rhythm.  No Edema  Pulmonary:  Pulmonary effort is normal.  Normal breath sounds. No wheezing, rhonchi or rales.   Abdominal: Soft nontender nondistended, normal bowel sounds, no guarding no rebound tenderness  Skin: Warm and dry  Neurological: Awake alert, speech is normal        ED Course     Labs Reviewed   COMP METABOLIC PANEL (14) - Abnormal; Notable for the following components:       Result Value    Glucose 280 (*)     Chloride 118 (*)     CO2 10.0 (*)     BUN 57 (*)     Creatinine 1.84 (*)     Calculated Osmolality 310 (*)     eGFR-Cr 41 (*)     Total Protein 8.3 (*)     Albumin 2.5 (*)     Globulin  5.8 (*)     A/G Ratio 0.4 (*)     All other components within normal limits   URINALYSIS WITH CULTURE REFLEX - Abnormal; Notable for the following components:    Urine Color Colorless (*)     Clarity Urine Turbid (*)     Glucose Urine >1000 (*)     Blood Urine 1+ (*)     Protein Urine 20 (*)     Leukocyte Esterase Urine 500 (*)     WBC Urine >50 (*)     RBC Urine 6-10 (*)     Bacteria Urine Rare (*)     Hyaline Casts Present (*)     All other components within normal limits   PROTHROMBIN TIME (PT) - Abnormal; Notable for the following components:    PT 14.9 (*)     All other components within normal limits   COMP METABOLIC PANEL (14) - Abnormal; Notable for the following components:    Glucose 260 (*)     Chloride 120 (*)     CO2 9.0 (*)     BUN 41 (*)     Creatinine 1.43 (*)     Calculated Osmolality 305 (*)     eGFR-Cr 56 (*)     Albumin 2.2 (*)     Globulin  5.4 (*)     A/G Ratio 0.4 (*)     All other components within normal limits   ED/MRSA SCREEN BY PCR-CC - Abnormal; Notable for the following components:    MRSA Screen By PCR Positive (*)     All other components within normal limits    Narrative:     A  positive result does not necessarily indicate the presence of viable organisms. For confirmation, epidemiological typing and susceptibility testing, appropriate culture is needed.    PLEASE REFER TO MRSA SCREENING PROTOCOL FOR TREATMENT.     CBC W/ DIFFERENTIAL - Abnormal; Notable for the following components:    WBC 23.3 (*)     RBC 3.67 (*)     HGB 10.5 (*)     HCT 30.7 (*)     Neutrophil Absolute Prelim 21.86 (*)     Neutrophil Absolute 21.86 (*)     Lymphocyte Absolute 0.16 (*)     Monocyte Absolute 1.07 (*)     All other components within normal limits   CBC W/ DIFFERENTIAL - Abnormal; Notable for the following components:    WBC 19.8 (*)     RBC 3.27 (*)     HGB 9.3 (*)     HCT 27.4 (*)     Neutrophil Absolute Prelim 18.71 (*)     Neutrophil Absolute 18.71 (*)     Lymphocyte Absolute 0.30 (*)     All other components within normal limits   TROPONIN I HIGH SENSITIVITY - Normal   TROPONIN I HIGH SENSITIVITY - Normal   LACTIC ACID, PLASMA - Normal   PTT, ACTIVATED - Normal   LACTIC ACID, PLASMA - Normal   SARS-COV-2/FLU A AND B/RSV BY PCR (GENEXPERT) - Normal    Narrative:     This test is intended for the qualitative detection and differentiation of SARS-CoV-2, influenza A, influenza B, and respiratory syncytial virus (RSV) viral RNA in nasopharyngeal or nares swabs from individuals suspected of respiratory viral infection consistent with COVID-19 by their healthcare provider. Signs and symptoms of respiratory viral infection due to SARS-CoV-2, influenza, and RSV can be similar.    Test performed using the Xpert Xpress SARS-CoV-2/FLU/RSV (real time RT-PCR)  assay on the GeneXpert instrument, [employer], [place] [address].   This test is being used under the Food and Drug Administration's Emergency Use Authorization.    The authorized Fact Sheet for Healthcare Providers for this assay is available upon request from the laboratory.   CBC WITH DIFFERENTIAL WITH PLATELET    Narrative:     The following orders were  created for panel order CBC With Differential With Platelet.  Procedure                               Abnormality         Status                     ---------                               -----------         ------                     CBC W/ DIFFERENTIAL[[id number]]          Abnormal            Final result                 Please view results for these tests on the individual orders.   CBC WITH DIFFERENTIAL WITH PLATELET    Narrative:     The following orders were created for panel order CBC With Differential With Platelet.  Procedure                               Abnormality         Status                     ---------                               -----------         ------                     CBC W/ DIFFERENTIAL[[id number]]          Abnormal            Final result                 Please view results for these tests on the individual orders.   RAINBOW DRAW LAVENDER   RAINBOW DRAW LIGHT GREEN   RAINBOW DRAW BLUE   RAINBOW DRAW GOLD   BLOOD CULTURE   BLOOD CULTURE   URINE CULTURE, ROUTINE     EKG    Rate, intervals and axes as noted on EKG Report.  Rate: 115  Rhythm: Sinus tach  Reading: Nonspecific changes no acute ischemic change                 Indication: The patient required placement of a central venous catheter for emergent venous access for purposes of laboratory evaluation, IV fluid administration, and /or medication administration.    After obtaining verbal and written consent, the patient was prepped for placement of a central venous catheter.  Area of entry was prepped and draped in sterile fashion.  Physical landmarks were identified and confirmed by ultrasound imaging.  Lidocaine 1% was then infiltrated for local anesthetic.    Using the linear probe covered in a sterile sheath, a view of the vein was obtained via ultrasound.  The right internal jugular vein was noted to be completely compressible and was identified as separate from any adjacent non compressible arterial structures. Under real-time  guidance, the introducer needle was observed to tent the visualized vein and then to puncture it.  There was positive return of dark non-pulsatile venous blood and J point wire was then inserted and advanced in a modified Seldinger technique.  The introducer needle was then removed and a #11 scalpel was then used to make a small incision to allow placement of the catheter with dilator. The triple lumen catheter was advanced over the wire to a depth of 15 cm into the superior vena cava vein and secured in place with sutures.  Blood was readily drawn back through all ports and ports were easily flushed.  Sterile dressing was then placed over the site.    Ultrasound images were captured for archival purposes.             MDM     59-year-old gentleman, recent bladder cancer resection and neobladder creation here for evaluation of fever generalized fatigue cough shortness of breath.  Differential includes sepsis, sepsis from urinary tract infection, intra-abdominal infection, viral syndrome, pneumonia.  Labs ordered and reviewed, significant for bicarb of 10, elevated white count to 23.  Patient received 30 cc/kg fluid bolus, was covered with cefepime and azithromycin for possible atypical pneumonia as identified on CT of the chest abdomen pelvis.  There is no pulmonary embolism identified, and it was noted that patient has hydronephrosis hydroureter and case was discussed with Dr. Palafox from urology who agrees with plan for Rodriguez catheter placement to decompress.  Rodriguez catheter placed with 1200 cc urine out.  Patient did become hypotensive despite 30 cc/kg fluid bolus, Levophed was started peripherally with improvement in MAP, case discussed with Critical access hospital intensivist who agrees with plan for admission to the ICU recommends bicarb drip, central line was placed to right IJ under ultrasound guidance.  Case was endorsed to the Critical access hospital hospitalist who agrees with plan for admission.    I independently viewed and interpreted the  following imaging: Postcentral line chest x-ray shows no pneumothorax.    Reviewed [facility] urology note from 3/29/2024, shows patient with history of bladder cancer, recent radical cystectomy, neobladder creation on 3/14/2024Admission disposition: 5/14/2024  6:44 AM                          Total critical care time: Approximately 85 minutes  Due to a high probability of clinically significant, life threatening deterioration, the patient required my highest level of preparedness to intervene emergently and I personally spent this critical care time directly and personally managing the patient. This critical care time included obtaining a history; examining the patient; pulse oximetry; ordering and review of studies; arranging urgent treatment with development of a management plan; evaluation of patient's response to treatment; frequent reassessment; and, discussions with other providers.  This critical care time was performed to assess and manage the high probability of imminent, life-threatening deterioration that could result in multi-organ failure. It was exclusive of separately billable procedures and treating other patients and teaching time.    [facility]   part of [facility]      Sepsis Reassessment Note    /67   Pulse 115   Temp (!) 101.7 °F (38.7 °C) (Oral)   Resp (!) 34   Ht 165.1 cm (5' 5\")   Wt 78.9 kg   SpO2 100%   BMI 28.96 kg/m²      I completed the sepsis reassessment at 1030    Cardiac:  Regularity: Regular  Rate: Normal  Heart Sounds: S1,S2    Lungs:   Right: Clear  Left: Clear    Peripheral Pulses:  Radial: Right 1+ or Left 1+      Capillary Refill:  <3 Secs    Skin:  Temp/Moisture: Warm and Dry  Color: Normal      Max Puentes MD  5/14/2024  5:07 AM            Medical Decision Making      Disposition and Plan     Clinical Impression:  1. Sepsis, due to unspecified organism, unspecified whether acute organ dysfunction present (HCC)    2. Metabolic acidosis    3. Pneumonia due  to infectious organism, unspecified laterality, unspecified part of lung    4. Septic shock (HCC)    5. Hydronephrosis, unspecified hydronephrosis type         Disposition:  Admit  5/14/2024  6:44 am    Follow-up:  No follow-up provider specified.        Medications Prescribed:  Current Discharge Medication List                            Hospital Problems       Present on Admission  Date Reviewed: 4/5/2022            ICD-10-CM Noted POA    * (Principal) Sepsis, due to unspecified organism, unspecified whether acute organ dysfunction present (HCC) A41.9 5/14/2024 Unknown    Anemia D64.9 5/14/2024 Yes    Azotemia R79.89 5/14/2024 Yes    Hyperglycemia R73.9 5/14/2024 Yes    Leukocytosis D72.829 5/14/2024 Yes    Metabolic acidosis E87.20 5/14/2024 Yes

## 2024-05-14 NOTE — H&P
[facility] Hospitalist H&P       CC:   Chief Complaint   Patient presents with    Difficulty Breathing    Fall    Fatigue        PCP: Antione Valadez DO    History of Present Illness: Patient is a 59 year old male with PMH sig for bladder cancer status post neobladder creation in March, nonischemic cardiomyopathy chronic low back pain status post spinal stimulator, pulmonary sarcoidosis who presents for evaluation of 1 week of generalized weakness, some shortness of breath, cough.  Patient was in usual state of health after his surgery until about a week ago when he started feeling tired, he had a fall earlier in the day.  Per wife, no fevers noted at home until the ambulance arrived.  No vomiting or diarrhea.  He has been urinating well since the creation of his neobladder.    In the ER, patient's vitals were significant for blood pressure 84/50, fever 101.7, heart rate 115 and respirations 34.  Patient had a CT chest abdomen pelvis which was abnormal.  He was admitted for further workup and management.      PMH  Past Medical History:    Back problem    Benign essential HTN    Calculus of kidney    Cancer (HCC)    Melanoma    Diabetes (HCC)    High blood pressure    High cholesterol    Mixed hyperlipidemia    Osteoarthritis    Problems with swallowing    Renal disorder    CKD Stage2    Sarcoidosis    Sarcoidosis of lung (HCC)    Shortness of breath    Sleep apnea    Pt use CPAP at night    Type 2 diabetes mellitus without complication, without long-term current use of insulin (HCC)        PSH  Past Surgical History:   Procedure Laterality Date    Cath bare metal stent (bms)  10/2019    Colonoscopy N/A 9/29/2020    Procedure: COLONOSCOPY;  Surgeon: David Jo MD;  Location:  ENDOSCOPY    Other  07/2019    Left elbow surgery    Other surgical history  06/22/2022    Osteophytectomy cervical 2, cervical 3, cervical 4, cervical 5         ALL:  No Known Allergies     Home Medications:  Outpatient  Medications Marked as Taking for the 5/14/24 encounter (Hospital Encounter)   Medication Sig Dispense Refill    BOGDAN VENTURAGN, 100 UNIT/ML Subcutaneous Solution Pen-injector Inject 20 Units into the skin nightly.      amLODIPine 10 MG Oral Tab Take 1 tablet (10 mg total) by mouth every morning.      Empagliflozin (JARDIANCE) 25 MG Oral Tab Take 1 tablet by mouth every morning.      valsartan 160 MG Oral Tab Take 1 tablet (160 mg total) by mouth daily.      Vitamin D, Cholecalciferol, 25 MCG (1000 UT) Oral Cap Take 1 capsule by mouth every 30 (thirty) days.      rosuvastatin 20 MG Oral Tab Take 1 tablet (20 mg total) by mouth daily.      gabapentin 300 MG Oral Cap Take 1 capsule (300 mg total) by mouth 3 (three) times daily.      aspirin 81 MG Oral Tab Take 1 tablet (81 mg total) by mouth daily.           Soc Hx  Social History     Tobacco Use    Smoking status: Never    Smokeless tobacco: Never   Substance Use Topics    Alcohol use: Never        Fam Hx  Family History   Problem Relation Age of Onset    Diabetes Father     Hypertension Father     Heart Disorder Father     Lipids Father     Cancer Mother         Liver    Diabetes Mother     Heart Disorder Mother     Hypertension Mother     Lipids Mother        Review of Systems  Comprehensive ROS reviewed and negative except for what's stated above.     OBJECTIVE:  /78 (BP Location: Right arm)   Pulse 103   Temp 99.5 °F (37.5 °C) (Temporal)   Resp (!) 35   Ht 5' 5\" (1.651 m)   Wt 160 lb 0.9 oz (72.6 kg)   SpO2 100%   BMI 26.63 kg/m²   General:  no distress, tired sleeping but arsouable    Head:  Normocephalic, without obvious abnormality, atraumatic.   Eyes:  Sclera anicteric, No conjunctival pallor, EOMs intact.    Nose: Nares normal. Septum midline. Mucosa normal. No drainage.   Throat: Lips, mucosa, and tongue normal. Teeth and gums normal.   Neck: Supple, symmetrical, trachea midline,   Lungs:   Diminished, limited exam, using CPAP while sleeping    Chest wall:  No tenderness or deformity.   Heart:  Regular rate and rhythm, S1, S2 normal    Abdomen:   Soft, non-tender. Bowel sounds normal. No masses,  Non distended   Extremities: Gait not assessed, moving all extremities spontaneously   Skin: Skin color, texture, turgor normal. No rashes or lesions.    Neurologic: Normal strength, no focal deficit appreciated     Diagnostic Data:    CBC/Chem  Recent Labs   Lab 05/14/24 0523 05/14/24 0524 05/14/24  1354   WBC 23.3*  --  19.8*   HGB 10.5*  --  9.3*   MCV 83.7  --  83.8   .0  --  214.0   INR  --  1.16  --        Recent Labs   Lab 05/14/24 0524 05/14/24  1354    138   K 4.2 3.7   * 120*   CO2 10.0* 9.0*   BUN 57* 41*   CREATSERUM 1.84* 1.43*   * 260*   CA 9.6 8.9       Recent Labs   Lab 05/14/24 0524 05/14/24  1354   ALT 40 38   AST 18 20   ALB 2.5* 2.2*       No results for input(s): \"TROP\" in the last 168 hours.    CXR: image personally reviewed     Radiology: XR CHEST AP PORTABLE  (CPT=71045)    Result Date: 5/14/2024  CONCLUSION:    Stable moderate elevation right diaphragm.  Central venous catheter placed via the right jugular approach tip in the SVC no pneumothorax.  Stable heart size.  No CHF or pleural effusion. LOCATION:  [facility]      Dictated by (CST): Tarun Castellanos MD on 5/14/2024 at 10:09 AM     Finalized by (CST): Tarun aCstellanos MD on 5/14/2024 at 10:10 AM       CT BRAIN OR HEAD (09626)    Result Date: 5/14/2024  CONCLUSION:  No acute process.    LOCATION:  [facility]   Dictated by (CST): Tarun Castellanos MD on 5/14/2024 at 7:46 AM     Finalized by (CST): Tarun Castellanos MD on 5/14/2024 at 7:46 AM       CTA CHEST + CT ABD (W) + CT PEL (W) SH(CPT=71275/92468)    Result Date: 5/14/2024  CONCLUSION:   1. Pulmonary artery evaluation limited by bolus opacification, no large or central pulmonary artery embolism identified.  2. Multifocal inflammatory appearing changes, including multifocal areas of inflammatory appearing  nodularity along, and reticular nodular interstitial changes.  Signs of airways inflammation are also present.  Concern for atypical infection.  No pleural  effusion or pneumothorax.  3. Reactive appearing lymph nodes in the chest.  4. Bilateral hydronephrosis and hydroureter.  Moderately dilated irregular shaped urinary bladder with areas of wall thickening.  No bladder stone or bladder gas collections.  Urology referral for these findings advised.  5. Renal cysts are present, but in addition to defined measurable cysts, there are smaller poorly defined areas of low attenuation not typical for cyst, and could reflect areas of pyelonephritis.    LOCATION:  [facility]   Dictated by (CST): Tarun Castellanos MD on 5/14/2024 at 7:37 AM     Finalized by (CST): Tarun Castellanos MD on 5/14/2024 at 7:45 AM       XR CHEST AP PORTABLE  (CPT=71045)    Result Date: 5/14/2024  CONCLUSION:  1. Minimal right basilar atelectasis. 2. Preliminary report was provided by [facility] at time of examination.  Final report confirms findings on preliminary report without discrepancy.    LOCATION:  [facility]      Dictated by (CST): Quintin Rossi MD on 5/14/2024 at 7:40 AM     Finalized by (CST): Quintin Rossi MD on 5/14/2024 at 7:41 AM          ASSESSMENT / PLAN:     Patient is a 59 year old male with PMH sig for bladder cancer status post neobladder creation in March, nonischemic cardiomyopathy chronic low back pain status post spinal stimulator, pulmonary sarcoidosis who presents for evaluation of 1 week of generalized weakness, some shortness of breath, cough.    Septic shock  Secondary to UTI and possible multifocal pneumonia  - IV pressors, wean as tolerated  - IVF  - Lactate within normal range  - 2D echo pending  - Empiric cefepime and azithromycin  - Await blood cultures, urine cultures  - ICU management    UTI  - Abnormal UA, await cultures  - CT evidence of hydronephrosis, bilateral  - In the setting of recent neobladder  creation  - Continue IV cefepime and azithromycin  - Urology on consult, appreciate input  - Maintain Rodriguez    ELENA  - In the setting of UTI and sepsis  - Follow BMP    History of bladder cancer  - Status post recent neobladder creation at [facility] in March 2024  - Follows with [facility] urologist    History of pulmonary sarcoidosis  - Multiple nodules on CT chest, unclear if infection versus sarcoid related  - Plan to treat with IV antibiotics for now, may need outpatient monitoring    Nonischemic cardiomyopathy  - Echo pending  - Hold aspirin, statin for now    Hypertension  - Hold antihypertensives, on pressors    Hyperlipidemia  - Hold statin for now    Acidosis, nongap  - Presenting bicarb 10, in the setting of sepsis  - Bicarb drip per ICU    Type 2 diabetes with hyperglycemia  - Hold home insulin  - Accu-Cheks, ISS, may need long-acting coverage when sepsis improves    FN:  - IVF: Bicarb drip  - Diet: Diabetic    DVT Prophy: SCDs, heparin subcu  Atrophy: Ambulate as tolerated  Lines: PIV    Dispo: ICU  Outpatient records or previous hospital records reviewed.     Further recommendations pending patient's clinical course.  [facility] hospitalist to continue to follow patient while in house    Patient and/or patient's family given opportunity to ask questions and note understanding and agreeing with therapeutic plan as outlined    Thank You,  DO Lei [facility] Hospitalist  Answering Service number: [phone]

## 2024-05-14 NOTE — PROGRESS NOTES
Patient arrived to unit monitored on ER cody accompanied by ER RN and PCT.  Patient transferred to ICU bed and monitors without incident.  Levo infusing via (R) internal jugular TLC upon arrival, see MAR for titrations.  Patient AO x 4, follows commands able to express needs.  SBP > 100/MAP > 65.  ST on monitor, -110. Febrile Temp 101.0.  SpO2 > 92% on RA.  Wife at bedside.  Critical Care MD aware of arrival to unit.

## 2024-05-14 NOTE — CONSULTS
Critical Care H&P/Consult     NAME: Yunior Garrett - ROOM: 468/468-A - MRN: [id number] - Age: 59 year old - :  1965    Date of Admission: 2024  4:43 AM  Admission Diagnosis: Metabolic acidosis [E87.20]  Sepsis, due to unspecified organism, unspecified whether acute organ dysfunction present (HCC) [A41.9]      Assessment/Plan:  Shock - suspect septic from UTI and possible multifocal pneumonia. Started on IV pressors after not responding to fluid bolus  - Wean pressors as tolerated for MAP >65  - Trend lactate  - Check echo to r/o septic emboli/endocarditis given appearance  - Cont IV fluids  - Antibiotics as below; add azithromycin for atypical pna coverage  UTI - moderate bilateral hydronephrosis on CT imaging, unclear if this is related to neobladder creation vs intrarenal obstruction or from sepsis  - Cefepime + azithromycin   - Urine culture pending  ELENA - appears acute, likely from sepsis and possibly obstruction. UA shows +LE and >WBC  - Cefepime for now pending culture  - Will narrow coverage as able  Hydronephrosis  - Renal US 2-3 days  - Rodriguez catheter for decompression  - UTI treatment with abx as above  Bladder CA - recent orthotopic neobladder creation at [facility] in 2024 for BCG unresponsive bladder CA  - Per urology  Pulmonary sarcoidosis - unclear history, there are multiple nodules on CT chest. May be sarcoid related vs infection  - Abx as above  - Will montior  - Hold on steroids  Cardiomyopathy, nonischemic  - Echo pending  - Monitor for fluid overload  Acidosis - nongapped, possibly from urinary losses vs RTA?  - Bicarb gtt  - Repeat labs pending  FEN/GI  - Diet as tolerated  Proph  - Heparin, SCD  Dispo  - Critically ill owing to shock state  - ICU level of care; full code  - We will follow along with you, call with questions    Discussed at length with patient / family.  Discussed with nurse    Critical Care Time: 62 minutes    Tod Alejandre DO  [facility]  Group  Pulmonary and Critical Care Medicine      History of Present Illness:   Yunior Garrett is a 59 year old with history of nonischemic cardiomyopathy, pulmonary sarcoidosis, chronic low back pain s/p spinal stimulator and bladder CA s/p neobladder creation.  Presenting for fevers, chils and hypotension for past several days.  Found to be in shock requiring IV pressors.  There is possible pneumonia on CT chest as well as bilateral hydronephrosis with perinephric stranding and cystitis in neobladder consistent with UTI.  Patient transferred to ICU for care when blood pressure was unresponsive to fluids.    Past Medical History:    Back problem    Benign essential HTN    Calculus of kidney    Cancer (HCC)    Melanoma    Diabetes (HCC)    High blood pressure    High cholesterol    Mixed hyperlipidemia    Osteoarthritis    Problems with swallowing    Renal disorder    CKD Stage2    Sarcoidosis    Sarcoidosis of lung (HCC)    Shortness of breath    Sleep apnea    Pt use CPAP at night    Type 2 diabetes mellitus without complication, without long-term current use of insulin (HCC)     Past Surgical History:   Procedure Laterality Date    Cath bare metal stent (bms)  10/2019    Colonoscopy N/A 2020    Procedure: COLONOSCOPY;  Surgeon: David Jo MD;  Location:  ENDOSCOPY    Other  2019    Left elbow surgery    Other surgical history  2022    Osteophytectomy cervical 2, cervical 3, cervical 4, cervical 5      Social History:  Social History     Tobacco Use    Smoking status: Never    Smokeless tobacco: Never   Substance Use Topics    Alcohol use: Never     Family History:  He indicated that his mother is . He indicated that his father is .      Allergies:No Known Allergies  No current outpatient medications on file.   [COMPLETED] acetaminophen (Tylenol Extra Strength) tab 1,000 mg  1,000 mg Oral Once    [COMPLETED] azithromycin (Zithromax) 500 mg in sodium chloride  0.9% 250mL IVPB premix  500 mg Intravenous Once    [COMPLETED] ceFEPIme (Maxpime) 2 g in sodium chloride 0.9% 100 mL IVPB-MBP  2 g Intravenous Once    sodium chloride 0.9 % IV bolus 1,845 mL  30 mL/kg (Ideal) Intravenous Continuous    [COMPLETED] iopamidol 76% (ISOVUE-370) injection for power injector  100 mL Intravenous ONCE PRN    [COMPLETED] sodium chloride 0.9 % IV bolus 1,000 mL  1,000 mL Intravenous Once    norepinephrine (Levophed) 4 mg/250mL infusion premix  0.5-30 mcg/min Intravenous Continuous    sodium bicarbonate 150 mEq in dextrose 5% 1,000 mL infusion  150 mEq Intravenous Continuous    acetaminophen (Tylenol) tab 650 mg  650 mg Oral Q6H PRN    acetaminophen (Ofirmev) 10 mg/mL infusion premix 1,000 mg  1,000 mg Intravenous Q6H PRN    ceFEPIme (Maxipime) 1 g in sodium chloride 0.9% 100 mL IVPB-MBP  1 g Intravenous Q12H    azithromycin (Zithromax) 500 mg in sodium chloride 0.9% 250mL IVPB premix  500 mg Intravenous Q24H      sodium chloride 1,845 mL (05/14/24 0550)    norepinephrine 2 mcg/min (05/14/24 1321)    sodium bicarbonate in D5W infusion 150 mEq (05/14/24 0821)       Review of systems:   12 point review of systems performed and is negative aside from what is noted above    Objective:  Intake/Output Summary (Last 24 hours) at 5/14/2024 1338  Last data filed at 5/14/2024 1200  Gross per 24 hour   Intake 2650 ml   Output 2800 ml   Net -150 ml      /58 (BP Location: Right arm)   Pulse 97   Temp 99.4 °F (37.4 °C) (Temporal)   Resp (!) 28   Ht 5' 5\" (1.651 m)   Wt 160 lb 0.9 oz (72.6 kg)   SpO2 100%   BMI 26.63 kg/m²   Physical Exam:   General: calm, no distres   HEENT: Normocephalic, without obvious abnormality, atraumatic. Moist oral mucosa   Neck: supple without mass   Lungs: clear   Chest wall: No tenderness or deformity.   Heart: Regular rate and rhythm, normal S1S2, no murmur.   Abdomen: soft, non-tender, non-distended, positive BS.   Extremity: No clubbing or cyanosis. no  edema   Skin: No new rashes or lesions.   Neuro: intact    Recent Labs   Lab 05/14/24  0523   RBC 3.67*   HGB 10.5*   HCT 30.7*   MCV 83.7   MCH 28.6   MCHC 34.2   RDW 13.7   NEPRELIM 21.86*   WBC 23.3*   .0     Recent Labs   Lab 05/14/24 0524   *   BUN 57*   CREATSERUM 1.84*   CA 9.6   ALB 2.5*      K 4.2   *   CO2 10.0*   ALKPHO 110   AST 18   ALT 40   BILT 0.4   TP 8.3*     No results for input(s): \"ABGPHT\", \"QXNPMV0M\", \"WVUKJ1F\", \"ABGHCO3\", \"ABGBE\", \"TEMP\", \"JD\", \"SITE\", \"DEV\", \"THGB\" in the last 168 hours.    Invalid input(s): \"OVA34DTS\", \"CHOB\"  Recent Labs   Lab 05/14/24 0524   *   BUN 57*   CREATSERUM 1.84*   CA 9.6      K 4.2   *   CO2 10.0*     No results for input(s): \"TROP\", \"CK\" in the last 168 hours.    Imaging: I independently visualized all relevant chest imaging in PACS, agree with radiology interpretation except where noted.

## 2024-05-14 NOTE — ED QUICK NOTES
CONSENT FOR CENTRAL LINE ON CHART, THIS RN SET UP FOR PROCEDURE IN ROOM, WIFE UPDATED ON PLAN OF CARE

## 2024-05-14 NOTE — ED QUICK NOTES
THIS RN ATTEMPT X1 FOR BRANCH CATH, UNSUCCESSFUL, MD NOTIFIED, PER VERBAL ORDER ATTEMPT WITH 16F COUDE

## 2024-05-14 NOTE — ED INITIAL ASSESSMENT (HPI)
Pt arrived via EMS for c/o feeling fatigued, generally weak,  MAVIS for a week. States he got up to use the bathroom PTA and fell. Pt denies hitting head, he denies pain post fall. Denies use of thinners, denies LOC.     Pt denies CP, he denies n/v, he denies problems with bowel and bladder. Temp 101.7 orally on arrival. Pt A/Ox4

## 2024-05-14 NOTE — ED QUICK NOTES
Rounding Completed    Plan of Care reviewed. Waiting for CT RESULTS.  Elimination needs assessed.  Provided WARM BLANKET, ANTIBIOTICS, PLACED ON MONITOR, FAMILY UPDATED ON PLAN OF CARE.    Bed is locked and in lowest position. Call light within reach.

## 2024-05-15 ENCOUNTER — APPOINTMENT (OUTPATIENT)
Dept: CV DIAGNOSTICS | Facility: HOSPITAL | Age: 59
DRG: 871 | End: 2024-05-15
Attending: STUDENT IN AN ORGANIZED HEALTH CARE EDUCATION/TRAINING PROGRAM

## 2024-05-15 LAB
ALBUMIN SERPL-MCNC: 1.9 G/DL (ref 3.4–5)
ALBUMIN/GLOB SERPL: 0.4 {RATIO} (ref 1–2)
ALP LIVER SERPL-CCNC: 83 U/L
ALT SERPL-CCNC: 34 U/L
ANION GAP SERPL CALC-SCNC: 6 MMOL/L (ref 0–18)
ANION GAP SERPL CALC-SCNC: 7 MMOL/L (ref 0–18)
ANION GAP SERPL CALC-SCNC: 8 MMOL/L (ref 0–18)
AST SERPL-CCNC: 19 U/L (ref 15–37)
BASOPHILS # BLD AUTO: 0.02 X10(3) UL (ref 0–0.2)
BASOPHILS NFR BLD AUTO: 0.2 %
BILIRUB SERPL-MCNC: 0.3 MG/DL (ref 0.1–2)
BUN BLD-MCNC: 20 MG/DL (ref 9–23)
BUN BLD-MCNC: 26 MG/DL (ref 9–23)
BUN BLD-MCNC: 30 MG/DL (ref 9–23)
CALCIUM BLD-MCNC: 7.7 MG/DL (ref 8.5–10.1)
CALCIUM BLD-MCNC: 7.7 MG/DL (ref 8.5–10.1)
CALCIUM BLD-MCNC: 8.3 MG/DL (ref 8.5–10.1)
CHLORIDE SERPL-SCNC: 108 MMOL/L (ref 98–112)
CHLORIDE SERPL-SCNC: 110 MMOL/L (ref 98–112)
CHLORIDE SERPL-SCNC: 114 MMOL/L (ref 98–112)
CO2 SERPL-SCNC: 18 MMOL/L (ref 21–32)
CO2 SERPL-SCNC: 20 MMOL/L (ref 21–32)
CO2 SERPL-SCNC: 25 MMOL/L (ref 21–32)
CREAT BLD-MCNC: 1.23 MG/DL
CREAT BLD-MCNC: 1.26 MG/DL
CREAT BLD-MCNC: 1.34 MG/DL
EGFRCR SERPLBLD CKD-EPI 2021: 60 ML/MIN/1.73M2 (ref 60–?)
EGFRCR SERPLBLD CKD-EPI 2021: 65 ML/MIN/1.73M2 (ref 60–?)
EGFRCR SERPLBLD CKD-EPI 2021: 67 ML/MIN/1.73M2 (ref 60–?)
EOSINOPHIL # BLD AUTO: 0 X10(3) UL (ref 0–0.7)
EOSINOPHIL NFR BLD AUTO: 0 %
ERYTHROCYTE [DISTWIDTH] IN BLOOD BY AUTOMATED COUNT: 13.7 %
GLOBULIN PLAS-MCNC: 4.8 G/DL (ref 2.8–4.4)
GLUCOSE BLD-MCNC: 224 MG/DL (ref 70–99)
GLUCOSE BLD-MCNC: 236 MG/DL (ref 70–99)
GLUCOSE BLD-MCNC: 239 MG/DL (ref 70–99)
GLUCOSE BLD-MCNC: 259 MG/DL (ref 70–99)
GLUCOSE BLD-MCNC: 272 MG/DL (ref 70–99)
GLUCOSE BLD-MCNC: 358 MG/DL (ref 70–99)
GLUCOSE BLD-MCNC: 367 MG/DL (ref 70–99)
HCT VFR BLD AUTO: 25.8 %
HGB BLD-MCNC: 8.5 G/DL
IMM GRANULOCYTES # BLD AUTO: 0.05 X10(3) UL (ref 0–1)
IMM GRANULOCYTES NFR BLD: 0.5 %
LYMPHOCYTES # BLD AUTO: 0.29 X10(3) UL (ref 1–4)
LYMPHOCYTES NFR BLD AUTO: 2.8 %
MAGNESIUM SERPL-MCNC: 1.9 MG/DL (ref 1.6–2.6)
MCH RBC QN AUTO: 27.6 PG (ref 26–34)
MCHC RBC AUTO-ENTMCNC: 32.9 G/DL (ref 31–37)
MCV RBC AUTO: 83.8 FL
MONOCYTES # BLD AUTO: 0.48 X10(3) UL (ref 0.1–1)
MONOCYTES NFR BLD AUTO: 4.7 %
NEUTROPHILS # BLD AUTO: 9.44 X10 (3) UL (ref 1.5–7.7)
NEUTROPHILS # BLD AUTO: 9.44 X10(3) UL (ref 1.5–7.7)
NEUTROPHILS NFR BLD AUTO: 91.8 %
OSMOLALITY SERPL CALC.SUM OF ELEC: 298 MOSM/KG (ref 275–295)
OSMOLALITY SERPL CALC.SUM OF ELEC: 301 MOSM/KG (ref 275–295)
OSMOLALITY SERPL CALC.SUM OF ELEC: 306 MOSM/KG (ref 275–295)
PLATELET # BLD AUTO: 170 10(3)UL (ref 150–450)
POTASSIUM SERPL-SCNC: 2.8 MMOL/L (ref 3.5–5.1)
POTASSIUM SERPL-SCNC: 3 MMOL/L (ref 3.5–5.1)
POTASSIUM SERPL-SCNC: 3.1 MMOL/L (ref 3.5–5.1)
PROT SERPL-MCNC: 6.7 G/DL (ref 6.4–8.2)
RBC # BLD AUTO: 3.08 X10(6)UL
SODIUM SERPL-SCNC: 138 MMOL/L (ref 136–145)
SODIUM SERPL-SCNC: 139 MMOL/L (ref 136–145)
SODIUM SERPL-SCNC: 139 MMOL/L (ref 136–145)
VANCOMYCIN PEAK SERPL-MCNC: 30.4 UG/ML (ref 30–50)
VANCOMYCIN TROUGH SERPL-MCNC: 14.6 UG/ML (ref 10–20)
WBC # BLD AUTO: 10.3 X10(3) UL (ref 4–11)

## 2024-05-15 PROCEDURE — 99291 CRITICAL CARE FIRST HOUR: CPT | Performed by: INTERNAL MEDICINE

## 2024-05-15 PROCEDURE — 93306 TTE W/DOPPLER COMPLETE: CPT | Performed by: STUDENT IN AN ORGANIZED HEALTH CARE EDUCATION/TRAINING PROGRAM

## 2024-05-15 RX ORDER — POTASSIUM CHLORIDE 14.9 MG/ML
20 INJECTION INTRAVENOUS ONCE
Status: COMPLETED | OUTPATIENT
Start: 2024-05-15 | End: 2024-05-15

## 2024-05-15 RX ORDER — VANCOMYCIN HYDROCHLORIDE
1250 EVERY 12 HOURS SCHEDULED
Status: DISCONTINUED | OUTPATIENT
Start: 2024-05-15 | End: 2024-05-18

## 2024-05-15 RX ORDER — SODIUM CHLORIDE 9 MG/ML
INJECTION, SOLUTION INTRAVENOUS CONTINUOUS
Status: DISCONTINUED | OUTPATIENT
Start: 2024-05-15 | End: 2024-05-16 | Stop reason: ALTCHOICE

## 2024-05-15 NOTE — PROGRESS NOTES
Pharmacy Dosing Service  Follow-up Pharmacokinetic Consult for Vancomycin Dosing          Yunior Garrett is a 59 year old male who is being treated for MRSA bacteremia.       Vancomycin (5/15--        Other current anti-infective medication(s):    Cefepime (5/14-  Azithromycin (5/14-5/16)      Est CrCl: ~50 mL/min (ELENA - baseline SCr ~1.0) - improving      Cultures:  5/14 BCx (2 of 2) - MRSA          A/P:   Pt received a 25mg/kg load of vancomycin - first dose levels have been used to calculate an appropriate dosing regimen.     Start a maintenance dose of vancomycin 1250mg q12hr based on first dose peak & trough.    Pharmacokinetic target:  to 600 mg-h/L and trough <=15 mg/L    Predicated pharmacokinetic parameters:  24 h-AUC: 528 mg-h/L   Trough ~11.0    Will re-check a vancomycin trough in the next 48hr or so..      Pharmacy will need SCr daily while on vancomycin to assess renal function.      Pharmacy will continue to follow him and adjust dosing as appropriate.        Valdez Mccauley, PharmD, BCPS  5/15/2024  12:23 PM  [facility] Extension: [phone]

## 2024-05-15 NOTE — PROGRESS NOTES
[facility]   part of [facility] Infectious Disease  Progress Note    Yunior Garrett Patient Status:  Inpatient    1965 MRN [id number]   [facility] 4SW-A Attending Adrienne Chahal DO   Hosp Day # 1 PCP Antione Valadez DO     Yunior Garrett is a 59 year old male.   Chief Complaint   Patient presents with    Difficulty Breathing    Fall    Fatigue       HPI:      Septic shock; was off pressors and now back on   Recent 3/14 neobladder construction  Chronic lbp not worse; has spinal stimulator  No hx heart murmur               REVIEW OF SYSTEMS:   A comprehensive 10 point review of systems was completed.  Pertinent positives and negatives noted in the the HPI.       Allergies:  No Known Allergies     Current Meds:    Current Facility-Administered Medications:     vancomycin (Vancocin) 1.25 g in sodium chloride 0.9% 250mL IVPB premix, 1,250 mg, Intravenous, Q12H    norepinephrine (Levophed) 4 mg/250mL infusion premix, 0.5-30 mcg/min, Intravenous, Continuous    acetaminophen (Tylenol) tab 650 mg, 650 mg, Oral, Q6H PRN    acetaminophen (Ofirmev) 10 mg/mL infusion premix 1,000 mg, 1,000 mg, Intravenous, Q6H PRN    ceFEPIme (Maxpime) 2 g in sodium chloride 0.9% 100 mL IVPB-MBP, 2 g, Intravenous, Q12H    azithromycin (Zithromax) 500 mg in sodium chloride 0.9% 250mL IVPB premix, 500 mg, Intravenous, Q24H    sodium bicarbonate 150 mEq in dextrose 5% 1,000 mL infusion, 150 mEq, Intravenous, Continuous    glucose (Dex4) 15 GM/59ML oral liquid 15 g, 15 g, Oral, Q15 Min PRN **OR** glucose (Glutose) 40% oral gel 15 g, 15 g, Oral, Q15 Min PRN **OR** glucose-vitamin C (Dex-4) chewable tab 4 tablet, 4 tablet, Oral, Q15 Min PRN **OR** dextrose 50% injection 50 mL, 50 mL, Intravenous, Q15 Min PRN **OR** glucose (Dex4) 15 GM/59ML oral liquid 30 g, 30 g, Oral, Q15 Min PRN **OR** glucose (Glutose) 40% oral gel 30 g, 30 g, Oral, Q15 Min PRN **OR** glucose-vitamin C (Dex-4)  chewable tab 8 tablet, 8 tablet, Oral, Q15 Min PRN    insulin aspart (NovoLOG) 100 Units/mL FlexPen 1-5 Units, 1-5 Units, Subcutaneous, TID AC and HS    enoxaparin (Lovenox) 40 MG/0.4ML SUBQ injection 40 mg, 40 mg, Subcutaneous, Daily    ondansetron (Zofran) 4 MG/2ML injection 4 mg, 4 mg, Intravenous, Q6H PRN    prochlorperazine (Compazine) 10 MG/2ML injection 5 mg, 5 mg, Intravenous, Q8H PRN    polyethylene glycol (PEG 3350) (Miralax) 17 g oral packet 17 g, 17 g, Oral, Daily PRN    sennosides (Senokot) tab 17.2 mg, 17.2 mg, Oral, Nightly PRN    bisacodyl (Dulcolax) 10 MG rectal suppository 10 mg, 10 mg, Rectal, Daily PRN    fleet enema (Fleet) 7-19 GM/118ML rectal enema 133 mL, 1 enema, Rectal, Once PRN    melatonin tab 3 mg, 3 mg, Oral, Nightly PRN   No current outpatient medications on file.        HISTORY:  Past Medical History:    Back problem    Benign essential HTN    Calculus of kidney    Cancer (HCC)    Melanoma    Diabetes (HCC)    High blood pressure    High cholesterol    Mixed hyperlipidemia    Osteoarthritis    Problems with swallowing    Renal disorder    CKD Stage2    Sarcoidosis    Sarcoidosis of lung (HCC)    Shortness of breath    Sleep apnea    Pt use CPAP at night    Type 2 diabetes mellitus without complication, without long-term current use of insulin (HCC)      Past Surgical History:   Procedure Laterality Date    Cath bare metal stent (bms)  10/2019    Colonoscopy N/A 9/29/2020    Procedure: COLONOSCOPY;  Surgeon: David Jo MD;  Location:  ENDOSCOPY    Other  07/2019    Left elbow surgery    Other surgical history  06/22/2022    Osteophytectomy cervical 2, cervical 3, cervical 4, cervical 5         Social history and family history negative related to present illness except as above.    PHYSICAL EXAM:   /55   Pulse 90   Temp (!) 100.5 °F (38.1 °C) (Temporal)   Resp 26   Ht 5' 5\" (1.651 m)   Wt 166 lb 3.6 oz (75.4 kg)   SpO2 99%   BMI 27.66 kg/m²   GENERAL:   Awake, alert, oriented x3. Non-tox, non-septic and in NAD.  HEENT:  Normocephalic, no scleral abnormalities.  Oropharynx clear, trachea ML.  NECK:  Supple, no masses, no lymphadenopathy.  LUNGS:  Clear to auscultation b/l, no rhonchi, rales, or wheezes.  CARDIO: RRR S1/S2, no rubs, clicks, heaves, or murmurs.  GI:  Soft NT/ND, BS present x4 quadrants, no HSM.  EXTREMITIES:  No edema, no clubbing, no cyanosis.  NEURO:  No focal neurologic deficits.  DERM:  Warm, dry, no rashes.    IMPRESSION/PLAN:   Septic shock with mrsa bacteremia rx vanc; ct had bilateral hydro and now has cardona in his neobladder; most likely this is source  Active urine sediment most likely from above; but will leave on cefepime till urine culture available  Multiple pulm nodules but with hx sarcoid; suggest getting us ct from [place] or taking our ct to [place] so they cna compare if any difference  No evidence endocarditis or seeded spine but these remain in ddx  Spoke with pt wife rn  Thanks, #[phone]            Recent Results (from the past 72 hour(s))   EKG 12 Lead    Collection Time: 05/14/24  4:48 AM   Result Value Ref Range    Ventricular rate 115 BPM    Atrial rate 115 BPM    P-R Interval 198 ms    QRS Duration 120 ms    Q-T Interval 328 ms    QTC Calculation (Bezet) 453 ms    P Axis 21 degrees    R Axis -52 degrees    T Axis 64 degrees   RAINBOW DRAW LAVENDER    Collection Time: 05/14/24  5:23 AM   Result Value Ref Range    Hold Lavender Auto Resulted    SARS-CoV-2/Flu A and B/RSV by PCR (GeneXpert)    Collection Time: 05/14/24  5:23 AM    Specimen: Nares; Other   Result Value Ref Range    SARS-CoV-2 (COVID-19) - (GeneXpert) Not Detected Not Detected    Influenza A by PCR Negative Negative    Influenza B by PCR Negative Negative    RSV by PCR Negative Negative   Lactic Acid, Plasma    Collection Time: 05/14/24  5:23 AM   Result Value Ref Range    Lactic Acid 1.2 0.4 - 2.0 mmol/L   CBC W/ DIFFERENTIAL    Collection Time: 05/14/24  5:23 AM    Result Value Ref Range    WBC 23.3 (H) 4.0 - 11.0 x10(3) uL    RBC 3.67 (L) 4.30 - 5.70 x10(6)uL    HGB 10.5 (L) 13.0 - 17.5 g/dL    HCT 30.7 (L) 39.0 - 53.0 %    .0 150.0 - 450.0 10(3)uL    MCV 83.7 80.0 - 100.0 fL    MCH 28.6 26.0 - 34.0 pg    MCHC 34.2 31.0 - 37.0 g/dL    RDW 13.7 %    Neutrophil Absolute Prelim 21.86 (H) 1.50 - 7.70 x10 (3) uL    Neutrophil Absolute 21.86 (H) 1.50 - 7.70 x10(3) uL    Lymphocyte Absolute 0.16 (L) 1.00 - 4.00 x10(3) uL    Monocyte Absolute 1.07 (H) 0.10 - 1.00 x10(3) uL    Eosinophil Absolute 0.00 0.00 - 0.70 x10(3) uL    Basophil Absolute 0.03 0.00 - 0.20 x10(3) uL    Immature Granulocyte Absolute 0.20 0.00 - 1.00 x10(3) uL    Neutrophil % 93.7 %    Lymphocyte % 0.7 %    Monocyte % 4.6 %    Eosinophil % 0.0 %    Basophil % 0.1 %    Immature Granulocyte % 0.9 %   RAINBOW DRAW LIGHT GREEN    Collection Time: 05/14/24  5:24 AM   Result Value Ref Range    Hold Lt Green Auto Resulted    RAINBOW DRAW BLUE    Collection Time: 05/14/24  5:24 AM   Result Value Ref Range    Hold Blue Auto Resulted    Comp Metabolic Panel (14)    Collection Time: 05/14/24  5:24 AM   Result Value Ref Range    Glucose 280 (H) 70 - 99 mg/dL    Sodium 137 136 - 145 mmol/L    Potassium 4.2 3.5 - 5.1 mmol/L    Chloride 118 (H) 98 - 112 mmol/L    CO2 10.0 (LL) 21.0 - 32.0 mmol/L    Anion Gap 9 0 - 18 mmol/L    BUN 57 (H) 9 - 23 mg/dL    Creatinine 1.84 (H) 0.70 - 1.30 mg/dL    Calcium, Total 9.6 8.5 - 10.1 mg/dL    Calculated Osmolality 310 (H) 275 - 295 mOsm/kg    eGFR-Cr 41 (L) >=60 mL/min/1.73m2    AST 18 15 - 37 U/L    ALT 40 16 - 61 U/L    Alkaline Phosphatase 110 45 - 117 U/L    Bilirubin, Total 0.4 0.1 - 2.0 mg/dL    Total Protein 8.3 (H) 6.4 - 8.2 g/dL    Albumin 2.5 (L) 3.4 - 5.0 g/dL    Globulin  5.8 (H) 2.8 - 4.4 g/dL    A/G Ratio 0.4 (L) 1.0 - 2.0   Troponin I (High Sensitivity)    Collection Time: 05/14/24  5:24 AM   Result Value Ref Range    Troponin I (High Sensitivity) 12 <=79 ng/L   Blood  Culture    Collection Time: 05/14/24  5:24 AM    Specimen: Blood,peripheral   Result Value Ref Range    Blood Culture Result Positive     Blood Culture Result Staphylococcus aureus (A)     Blood Smear Positive Blood Culture (A)     Blood Smear Gram positive cocci in clusters (A)    Prothrombin Time (PT)    Collection Time: 05/14/24  5:24 AM   Result Value Ref Range    PT 14.9 (H) 11.6 - 14.8 seconds    INR 1.16 0.80 - 1.20   PTT, Activated    Collection Time: 05/14/24  5:24 AM   Result Value Ref Range    PTT 33.0 23.0 - 36.0 seconds   BLD CULT ID PCR GPC PANEL    Collection Time: 05/14/24  5:24 AM    Specimen: Blood,peripheral   Result Value Ref Range    Staphylococcus aureus, MRSA by PCR Detected (A)     Blood Culture    Collection Time: 05/14/24  5:32 AM    Specimen: Blood,peripheral   Result Value Ref Range    Blood Culture Result Positive     Blood Culture Result Staphylococcus aureus (A)     Blood Smear Positive Blood Culture (A)     Blood Smear Gram positive cocci in clusters (A)    Troponin I (High Sensitivity)    Collection Time: 05/14/24  7:31 AM   Result Value Ref Range    Troponin I (High Sensitivity) 19 <=79 ng/L   Urinalysis with Culture Reflex    Collection Time: 05/14/24  9:13 AM    Specimen: Urine, clean catch   Result Value Ref Range    Urine Color Colorless (A) Yellow    Clarity Urine Turbid (A) Clear    Spec Gravity 1.015 1.005 - 1.030    Glucose Urine >1000 (A) Normal mg/dL    Bilirubin Urine Negative Negative    Ketones Urine Negative Negative mg/dL    Blood Urine 1+ (A) Negative    pH Urine 5.5 5.0 - 8.0    Protein Urine 20 (A) Negative mg/dL    Urobilinogen Urine Normal Normal mg/dL    Nitrite Urine Negative Negative    Leukocyte Esterase Urine 500 (A) Negative    WBC Urine >50 (A) 0 - 5 /HPF    RBC Urine 6-10 (A) 0 - 2 /HPF    Bacteria Urine Rare (A) None Seen /HPF    Squamous Epi. Cells None Seen None Seen /HPF    Renal Tubular Epithelial Cells None Seen None Seen /HPF    Transitional Cells  None Seen None Seen /HPF    Hyaline Casts Present (A) None Seen /LPF    Yeast Urine None Seen None Seen /HPF   Emergency MRSA Screen by PCR    Collection Time: 05/14/24 12:19 PM   Result Value Ref Range    MRSA Screen By PCR Positive (A) Negative   Comp Metabolic Panel (14)    Collection Time: 05/14/24  1:54 PM   Result Value Ref Range    Glucose 260 (H) 70 - 99 mg/dL    Sodium 138 136 - 145 mmol/L    Potassium 3.7 3.5 - 5.1 mmol/L    Chloride 120 (H) 98 - 112 mmol/L    CO2 9.0 (LL) 21.0 - 32.0 mmol/L    Anion Gap 9 0 - 18 mmol/L    BUN 41 (H) 9 - 23 mg/dL    Creatinine 1.43 (H) 0.70 - 1.30 mg/dL    Calcium, Total 8.9 8.5 - 10.1 mg/dL    Calculated Osmolality 305 (H) 275 - 295 mOsm/kg    eGFR-Cr 56 (L) >=60 mL/min/1.73m2    AST 20 15 - 37 U/L    ALT 38 16 - 61 U/L    Alkaline Phosphatase 103 45 - 117 U/L    Bilirubin, Total 0.2 0.1 - 2.0 mg/dL    Total Protein 7.6 6.4 - 8.2 g/dL    Albumin 2.2 (L) 3.4 - 5.0 g/dL    Globulin  5.4 (H) 2.8 - 4.4 g/dL    A/G Ratio 0.4 (L) 1.0 - 2.0   CBC W/ DIFFERENTIAL    Collection Time: 05/14/24  1:54 PM   Result Value Ref Range    WBC 19.8 (H) 4.0 - 11.0 x10(3) uL    RBC 3.27 (L) 4.30 - 5.70 x10(6)uL    HGB 9.3 (L) 13.0 - 17.5 g/dL    HCT 27.4 (L) 39.0 - 53.0 %    .0 150.0 - 450.0 10(3)uL    MCV 83.8 80.0 - 100.0 fL    MCH 28.4 26.0 - 34.0 pg    MCHC 33.9 31.0 - 37.0 g/dL    RDW 14.0 %    Neutrophil Absolute Prelim 18.71 (H) 1.50 - 7.70 x10 (3) uL    Neutrophil Absolute 18.71 (H) 1.50 - 7.70 x10(3) uL    Lymphocyte Absolute 0.30 (L) 1.00 - 4.00 x10(3) uL    Monocyte Absolute 0.60 0.10 - 1.00 x10(3) uL    Eosinophil Absolute 0.00 0.00 - 0.70 x10(3) uL    Basophil Absolute 0.03 0.00 - 0.20 x10(3) uL    Immature Granulocyte Absolute 0.11 0.00 - 1.00 x10(3) uL    Neutrophil % 94.7 %    Lymphocyte % 1.5 %    Monocyte % 3.0 %    Eosinophil % 0.0 %    Basophil % 0.2 %    Immature Granulocyte % 0.6 %   Lactic Acid, Plasma    Collection Time: 05/14/24  2:00 PM   Result Value Ref Range     Lactic Acid 1.1 0.4 - 2.0 mmol/L   POCT Glucose    Collection Time: 05/14/24  4:50 PM   Result Value Ref Range    POC Glucose 200 (H) 70 - 99 mg/dL   Basic Metabolic Panel (8)    Collection Time: 05/14/24  6:56 PM   Result Value Ref Range    Glucose 242 (H) 70 - 99 mg/dL    Sodium 139 136 - 145 mmol/L    Potassium 3.0 (L) 3.5 - 5.1 mmol/L    Chloride 115 (H) 98 - 112 mmol/L    CO2 14.0 (L) 21.0 - 32.0 mmol/L    Anion Gap 10 0 - 18 mmol/L    BUN 38 (H) 9 - 23 mg/dL    Creatinine 1.45 (H) 0.70 - 1.30 mg/dL    Calcium, Total 8.4 (L) 8.5 - 10.1 mg/dL    Calculated Osmolality 305 (H) 275 - 295 mOsm/kg    eGFR-Cr 55 (L) >=60 mL/min/1.73m2   POCT Glucose    Collection Time: 05/14/24  8:48 PM   Result Value Ref Range    POC Glucose 275 (H) 70 - 99 mg/dL   Comp Metabolic Panel (14)    Collection Time: 05/15/24  3:53 AM   Result Value Ref Range    Glucose 224 (H) 70 - 99 mg/dL    Sodium 139 136 - 145 mmol/L    Potassium 3.0 (L) 3.5 - 5.1 mmol/L    Chloride 114 (H) 98 - 112 mmol/L    CO2 18.0 (L) 21.0 - 32.0 mmol/L    Anion Gap 7 0 - 18 mmol/L    BUN 30 (H) 9 - 23 mg/dL    Creatinine 1.26 0.70 - 1.30 mg/dL    Calcium, Total 8.3 (L) 8.5 - 10.1 mg/dL    Calculated Osmolality 301 (H) 275 - 295 mOsm/kg    eGFR-Cr 65 >=60 mL/min/1.73m2    AST 19 15 - 37 U/L    ALT 34 16 - 61 U/L    Alkaline Phosphatase 83 45 - 117 U/L    Bilirubin, Total 0.3 0.1 - 2.0 mg/dL    Total Protein 6.7 6.4 - 8.2 g/dL    Albumin 1.9 (L) 3.4 - 5.0 g/dL    Globulin  4.8 (H) 2.8 - 4.4 g/dL    A/G Ratio 0.4 (L) 1.0 - 2.0   Potassium    Collection Time: 05/15/24  3:53 AM   Result Value Ref Range    Potassium 3.0 (L) 3.5 - 5.1 mmol/L   CBC W/ DIFFERENTIAL    Collection Time: 05/15/24  3:53 AM   Result Value Ref Range    WBC 10.3 4.0 - 11.0 x10(3) uL    RBC 3.08 (L) 4.30 - 5.70 x10(6)uL    HGB 8.5 (L) 13.0 - 17.5 g/dL    HCT 25.8 (L) 39.0 - 53.0 %    .0 150.0 - 450.0 10(3)uL    MCV 83.8 80.0 - 100.0 fL    MCH 27.6 26.0 - 34.0 pg    MCHC 32.9 31.0 - 37.0  g/dL    RDW 13.7 %    Neutrophil Absolute Prelim 9.44 (H) 1.50 - 7.70 x10 (3) uL    Neutrophil Absolute 9.44 (H) 1.50 - 7.70 x10(3) uL    Lymphocyte Absolute 0.29 (L) 1.00 - 4.00 x10(3) uL    Monocyte Absolute 0.48 0.10 - 1.00 x10(3) uL    Eosinophil Absolute 0.00 0.00 - 0.70 x10(3) uL    Basophil Absolute 0.02 0.00 - 0.20 x10(3) uL    Immature Granulocyte Absolute 0.05 0.00 - 1.00 x10(3) uL    Neutrophil % 91.8 %    Lymphocyte % 2.8 %    Monocyte % 4.7 %    Eosinophil % 0.0 %    Basophil % 0.2 %    Immature Granulocyte % 0.5 %   Vancomycin Peak, Serum    Collection Time: 05/15/24  4:19 AM   Result Value Ref Range    Vancomycin Peak 30.4 30.0 - 50.0 ug/mL   POCT Glucose    Collection Time: 05/15/24  6:28 AM   Result Value Ref Range    POC Glucose 259 (H) 70 - 99 mg/dL   Vancomycin Trough, Serum    Collection Time: 05/15/24 10:57 AM   Result Value Ref Range    Vancomycin Trough 14.6 10.0 - 20.0 ug/mL   Basic Metabolic Panel (8)    Collection Time: 05/15/24 10:57 AM   Result Value Ref Range    Glucose 367 (H) 70 - 99 mg/dL    Sodium 138 136 - 145 mmol/L    Potassium 3.0 (L) 3.5 - 5.1 mmol/L    Chloride 110 98 - 112 mmol/L    CO2 20.0 (L) 21.0 - 32.0 mmol/L    Anion Gap 8 0 - 18 mmol/L    BUN 26 (H) 9 - 23 mg/dL    Creatinine 1.34 (H) 0.70 - 1.30 mg/dL    Calcium, Total 7.7 (L) 8.5 - 10.1 mg/dL    Calculated Osmolality 306 (H) 275 - 295 mOsm/kg    eGFR-Cr 60 >=60 mL/min/1.73m2   POCT Glucose    Collection Time: 05/15/24 12:53 PM   Result Value Ref Range    POC Glucose 358 (H) 70 - 99 mg/dL         Navi Chow MD     CALL [facility] AT [phone] IF QUESTIONS OR CONCERNS  THANKS

## 2024-05-15 NOTE — PLAN OF CARE
Assumed cares about 1930, Patient denies pain/discomfort overnight.  On/off CPAP overnight, tolerating RA.  SBP maintained >90  HR 90-100s, increased when temp increased. Tmax 101.3, PRN Tylenol given when available.  Bcx resulted, ID consulted overnight, started Vancomycin, placed on isolation precautions.   Wife at bedside, updated on patient status and education on isolation precautions/hand hygiene.  K replaced.  Nausea this AM, PRN given.   No further concerns at this time.    Problem: Diabetes/Glucose Control  Goal: Glucose maintained within prescribed range  Description: INTERVENTIONS:  - Monitor Blood Glucose as ordered  - Assess for signs and symptoms of hyperglycemia and hypoglycemia  - Administer ordered medications to maintain glucose within target range  - Assess barriers to adequate nutritional intake and initiate nutrition consult as needed  - Instruct patient on self management of diabetes  Outcome: Progressing     Problem: Patient/Family Goals  Goal: Patient/Family Long Term Goal  Description: Patient's Long Term Goal: Return home with wife, remain infection free    Interventions:  - ABX per ID, PRN tylenol for fever management, Maintain BP within parameters >90  - See additional Care Plan goals for specific interventions  Outcome: Progressing  Goal: Patient/Family Short Term Goal  Description: Patient's Short Term Goal: Breathe comfortably overnight    Interventions:   - CPAP while asleep, PRN medications, Activity tolerance, PT/OT  - See additional Care Plan goals for specific interventions  Outcome: Progressing     Problem: PAIN - ADULT  Goal: Verbalizes/displays adequate comfort level or patient's stated pain goal  Description: INTERVENTIONS:  - Encourage pt to monitor pain and request assistance  - Assess pain using appropriate pain scale  - Administer analgesics based on type and severity of pain and evaluate response  - Implement non-pharmacological measures as appropriate and evaluate  response  - Consider cultural and social influences on pain and pain management  - Manage/alleviate anxiety  - Utilize distraction and/or relaxation techniques  - Monitor for opioid side effects  - Notify MD/LIP if interventions unsuccessful or patient reports new pain  - Anticipate increased pain with activity and pre-medicate as appropriate  Outcome: Progressing     Problem: RISK FOR INFECTION - ADULT  Goal: Absence of fever/infection during anticipated neutropenic period  Description: INTERVENTIONS  - Monitor WBC  - Administer growth factors as ordered  - Implement neutropenic guidelines  Outcome: Progressing     Problem: SAFETY ADULT - FALL  Goal: Free from fall injury  Description: INTERVENTIONS:  - Assess pt frequently for physical needs  - Identify cognitive and physical deficits and behaviors that affect risk of falls.  - [place] fall precautions as indicated by assessment.  - Educate pt/family on patient safety including physical limitations  - Instruct pt to call for assistance with activity based on assessment  - Modify environment to reduce risk of injury  - Provide assistive devices as appropriate  - Consider OT/PT consult to assist with strengthening/mobility  - Encourage toileting schedule  Outcome: Progressing     Problem: DISCHARGE PLANNING  Goal: Discharge to home or other facility with appropriate resources  Description: INTERVENTIONS:  - Identify barriers to discharge w/pt and caregiver  - Include patient/family/discharge partner in discharge planning  - Arrange for needed discharge resources and transportation as appropriate  - Identify discharge learning needs (meds, wound care, etc)  - Arrange for interpreters to assist at discharge as needed  - Consider post-discharge preferences of patient/family/discharge partner  - Complete POLST form as appropriate  - Assess patient's ability to be responsible for managing their own health  - Refer to Case Management Department for coordinating  discharge planning if the patient needs post-hospital services based on physician/LIP order or complex needs related to functional status, cognitive ability or social support system  Outcome: Progressing     Problem: RESPIRATORY - ADULT  Goal: Achieves optimal ventilation and oxygenation  Description: INTERVENTIONS:  - Assess for changes in respiratory status  - Assess for changes in mentation and behavior  - Position to facilitate oxygenation and minimize respiratory effort  - Oxygen supplementation based on oxygen saturation or ABGs  - Provide Smoking Cessation handout, if applicable  - Encourage broncho-pulmonary hygiene including cough, deep breathe, Incentive Spirometry  - Assess the need for suctioning and perform as needed  - Assess and instruct to report SOB or any respiratory difficulty  - Respiratory Therapy support as indicated  - Manage/alleviate anxiety  - Monitor for signs/symptoms of CO2 retention  Outcome: Progressing     Problem: METABOLIC/FLUID AND ELECTROLYTES - ADULT  Goal: Electrolytes maintained within normal limits  Description: INTERVENTIONS:  - Monitor labs and rhythm and assess patient for signs and symptoms of electrolyte imbalances  - Administer electrolyte replacement as ordered  - Monitor response to electrolyte replacements, including rhythm and repeat lab results as appropriate  - Fluid restriction as ordered  - Instruct patient on fluid and nutrition restrictions as appropriate  Outcome: Progressing

## 2024-05-15 NOTE — PLAN OF CARE
Received pt at change of shift. Pt alert and oriented x4 on room air. Pt with complaints of SOB with activity. TMAX 102. APN notified. Tylenol IV given as well as ice packs to neck and armpits. Recheck 99.6. Pt cardona cath clogged due to sediment. Flushed with sterile water x1. 800cc of urine drained. Pt off of pressors. Repeat blood cultures drawn. K replaced per protocol per APN. Critical K reported as well. Will continue to monitor. See MAR/flowsheets for additional information.

## 2024-05-15 NOTE — DISCHARGE INSTRUCTIONS
Follow-up with your established urologist (Dr. Yost) after discharge.    Caring for Your Catheter    A cardona catheter has been placed into your bladder to drain your urine.  A small balloon in the catheter is inflated and keeps the catheter in your bladder.  Proper care of your catheter and the drainage bag can help to prevent infections.    Care of the Catheter:  The catheter should be securely taped to your thigh or abdomen to prevent pulling or increased tension on the urethra.  At least once daily, gently wash the catheter starting where the catheter enters your body and cleanse down the entire length of the catheter. Use a mild soap.  Do not use any astringents or antibacterial soap. It's best done in the shower. Make sure that any encrustations on the catheter are washed away.  Rinse well.   If you are an uncircumcised male, push the foreskin back to clean and after cleaning the catheter push the foreskin back to its normal position  Care of the Drainage Bag       Empty the drainage bag when it is ½ to 1/3 full.  The drainage bag must always be to gravity drainage and must be below the level of your bladder.  The bag should never be left lying on the floor.  Drainage bags should be cleaned each time you switch from a leg bag to a night drainage bag.  If you do not switch from a leg bag to a night drainage system, you should wash your drainage bag 1 x/week.  After disconnecting the tubing from the catheter, pour a solution of 1 part bleach to 10 parts water through the tubing and the bag. Rinse the inside and the outside of the drainage bag with water to remove all the bleach solution so that no injury to your skin will develop.  Empty the drainage bag when it is ½ to 1/3 full.  The drainage bag must always have gravity drainage and must be below the level of your bladder.  What To Do If You Leak Around the Catheter:  Check the connections between the catheter and the drainage system to make sure they are  intact and not the source of the leak.  Make sure the catheter is securely taped and holding the catheter securely and not pulling.  Readjust as necessary.  Make sure that the catheter is gravity drainage.  Call the office. You may be experiencing bladder spasms. You may be asked to come in and have the catheter checked or you may be given a medication that stops the spasms.  Special Instructions:  Increase your fluid intake so that your urine output is between 1500 and 2000cc’s/day.  Prevent constipation.  Increase daily fiber or use Miralax as needed.  Contact Our Office If:  If you see excessive blood or clots in your urine  If you have a temperature that is greater than 101.  If you don’t see any urine in your drainage bag after 3-4 hours.  Check the position of the drainage bag first and also make sure the catheter isn’t kinked.  If you have any burning, pain, purulent drainage or bleeding from around the  catheter site.  If you have persistent leaking around the catheter.         [facility]  Ph: [phone], fax: [phone]    Home Health Provider  Sometimes managing your health at home requires assistance.  The [facility] team has recognized your preference to use [facility].  They can be reached by phone at [phone].  The fax number for your reference is [phone].. A representative from the home health agency will contact you or your family to schedule your first visit.

## 2024-05-15 NOTE — CONSULTS
[facility]    PATIENT'S NAME: LIYA BACK   ATTENDING PHYSICIAN: Adrienne Chahal DO   CONSULTING PHYSICIAN: Navi Chow M.D.   PATIENT ACCOUNT#:   [id number]    LOCATION:  [address] [place] [address]  MEDICAL RECORD #:   [id number]       YOB: 1965  ADMISSION DATE:       05/14/2024      CONSULT DATE:  05/15/2024    REPORT OF CONSULTATION    HISTORY OF PRESENT ILLNESS:  This is a 59-year-old man who has a history of bladder cancer.  This did not respond well to BCG treatment.  He went for a second opinion, I believe, at [facility], and he elected to undergo bladder resection with neobladder formation.  This was done on March 14.  He also has a history of sarcoidosis with multiple pulmonary nodules, followed at the [facility].  They said his last CT scan was unchanged and has been like that for a while.  He presents with septic shock.  Blood cultures have grown MRSA.  He was weaned off pressors, but again this morning is back on pressors.  I am asked to evaluate.  No other GI, cardiovascular, CNS, or respiratory symptoms are currently noted, and he remains not short of breath at rest.  Chronically, he is short of breath with exertion, which is blamed on his sarcoid.  He is off oxygen.  A CT scan done revealed hydronephrosis bilateral, and a Rodriguez has been inserted into his neobladder.  In addition, he has chronic low back pain.  This pain is not worse, and he has a spinal stimulator which helps.    PAST MEDICAL HISTORY:  The above, along with melanoma in the past, renal calculus in the past, and diabetes.     PAST SURGICAL HISTORY:  He has had cardiac catheterizations also with stents, cervical spine surgery in the past also.    MEDICATIONS:  Cefepime and vancomycin.  Other medications reviewed in Epic.      ALLERGIES:  No known allergies.    SOCIAL HISTORY:  Negative for cigarettes and alcohol.  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    FAMILY HISTORY:  Nobody else ill.    REVIEW OF SYSTEMS:  Weight is stable.      PHYSICAL EXAMINATION:    GENERAL:  This is a fatigued man, currently pressor dependent, off oxygen.    VITAL SIGNS:  Temperature is 100.5; blood pressure 100/55, on low-dose pressors 3 mcg Levophed, I believe.  HEENT:  Normal.  Pale conjunctivae.  NECK:  Supple.  No JVD or adenopathy.  LUNGS:  Seem clear.  HEART:  No murmur heard.  BACK:  Without CVA discomfort.  ABDOMEN:  Not clearly tender.  No masses, rebound, or organomegaly.  Neobladder and wounds okay.  EXTREMITIES:  No clubbing, cyanosis, edema, phlebitis, or cellulitis.   NEUROLOGIC:  Grossly intact.  Moving all extremities.    LABORATORY DATA:  Blood culture is MRSA.  BUN and creatinine 26 and 1.34, glucose is high.  White count 10.3, hemoglobin 8.5, platelets 170, polys 92, lymphs 3, monos 5.  Previous white count was up to 23,000.  Urinalysis:  Active sediment.  MRSA is PCR positive.  Urine culture pending.      There is a CT of the chest and abdomen:  Inflammatory nodularity in the lungs.  See the report.  Bilateral hydronephrosis and hydroureter.  Renal cyst also.  Not mentioned above is that there is a central line in the right internal jugular area.  Site looks okay.    IMPRESSION:    1.   Septic shock with methicillin-resistant Staphylococcus aureus bacteremia.  Treatment will continue with vancomycin.  The CT had bilateral hydronephrosis, and he now has a Rodriguez catheter in his neobladder.  This is the most likely source of the events by my view.  2.   The urine, however, has active sediment.  Most likely this is from the above and I would anticipate MRSA growing from his urine, but we will leave him on cefepime until the urine culture becomes available.  3.   There are multiple pulmonary nodules, but he has a history of sarcoid.  He is not on oxygen, and I would suggest he either get us the CT results from the [facility] or get our CT of his chest to the [facility] so they can compare if there are any differences.  4.   There is no evidence of endocarditis or seeded spine, but with MRSA bacteremia, both of these are in the differential.  An echocardiogram result is pending.      I have spoken with the patient, wife, and nurse.  Further suggestions to follow.      Thank you very much for allowing me to see this patient.    Dictated By Navi Chow M.D.  d: 05/15/2024 13:21:58  t: 05/15/2024 13:46:59  [facility] [id number]  [facility]/

## 2024-05-15 NOTE — PROGRESS NOTES
[facility]    Yunior Garrett Patient Status:  Inpatient    1965 MRN [id number]   Location [facility] 4SW-A Attending Adrienne Chahal DO   Hosp Day # 1 PCP Antione Valadez DO     Critical Care Progress Note     Date of Admission: 2024  4:43 AM  Admission Diagnosis: Metabolic acidosis [E87.20]  Sepsis, due to unspecified organism, unspecified whether acute organ dysfunction present (HCC) [A41.9]     S: overall doing quite a bit better. Denies pain.  Has mild SOB. No cough/phlegm.  Febrile earlier today to 101.3      Current Medications:    Current Facility-Administered Medications:     [COMPLETED] potassium chloride 40 mEq in 250mL sodium chloride 0.9% IVPB premix, 40 mEq, Intravenous, Once **FOLLOWED BY** potassium chloride 20 mEq/100mL IVPB premix 20 mEq, 20 mEq, Intravenous, Once    norepinephrine (Levophed) 4 mg/250mL infusion premix, 0.5-30 mcg/min, Intravenous, Continuous    acetaminophen (Tylenol) tab 650 mg, 650 mg, Oral, Q6H PRN    acetaminophen (Ofirmev) 10 mg/mL infusion premix 1,000 mg, 1,000 mg, Intravenous, Q6H PRN    ceFEPIme (Maxpime) 2 g in sodium chloride 0.9% 100 mL IVPB-MBP, 2 g, Intravenous, Q12H    azithromycin (Zithromax) 500 mg in sodium chloride 0.9% 250mL IVPB premix, 500 mg, Intravenous, Q24H    sodium bicarbonate 150 mEq in dextrose 5% 1,000 mL infusion, 150 mEq, Intravenous, Continuous    glucose (Dex4) 15 GM/59ML oral liquid 15 g, 15 g, Oral, Q15 Min PRN **OR** glucose (Glutose) 40% oral gel 15 g, 15 g, Oral, Q15 Min PRN **OR** glucose-vitamin C (Dex-4) chewable tab 4 tablet, 4 tablet, Oral, Q15 Min PRN **OR** dextrose 50% injection 50 mL, 50 mL, Intravenous, Q15 Min PRN **OR** glucose (Dex4) 15 GM/59ML oral liquid 30 g, 30 g, Oral, Q15 Min PRN **OR** glucose (Glutose) 40% oral gel 30 g, 30 g, Oral, Q15 Min PRN **OR** glucose-vitamin C (Dex-4) chewable tab 8 tablet, 8 tablet, Oral, Q15 Min PRN    insulin aspart (NovoLOG) 100 Units/mL FlexPen 1-5 Units, 1-5  Units, Subcutaneous, TID AC and HS    enoxaparin (Lovenox) 40 MG/0.4ML SUBQ injection 40 mg, 40 mg, Subcutaneous, Daily    ondansetron (Zofran) 4 MG/2ML injection 4 mg, 4 mg, Intravenous, Q6H PRN    prochlorperazine (Compazine) 10 MG/2ML injection 5 mg, 5 mg, Intravenous, Q8H PRN    polyethylene glycol (PEG 3350) (Miralax) 17 g oral packet 17 g, 17 g, Oral, Daily PRN    sennosides (Senokot) tab 17.2 mg, 17.2 mg, Oral, Nightly PRN    bisacodyl (Dulcolax) 10 MG rectal suppository 10 mg, 10 mg, Rectal, Daily PRN    fleet enema (Fleet) 7-19 GM/118ML rectal enema 133 mL, 1 enema, Rectal, Once PRN    melatonin tab 3 mg, 3 mg, Oral, Nightly PRN    vancomycin (Vancocin) 1,000 mg in sodium chloride 0.9% 250 mL IVPB-ADDV, 15 mg/kg, Intravenous, Q12H     OBJECTIVE:  BP 99/80   Pulse 92   Temp (!) 100.5 °F (38.1 °C) (Temporal)   Resp 25   Ht 165.1 cm (5' 5\")   Wt 166 lb 3.6 oz (75.4 kg)   SpO2 97%   BMI 27.66 kg/m²      On 2-3 l nc      Wt Readings from Last 3 Encounters:   05/15/24 166 lb 3.6 oz (75.4 kg)   06/22/22 170 lb (77.1 kg)   04/05/22 175 lb (79.4 kg)        I/O last 3 completed shifts:  In: 8142 [P.O.:1460; I.V.:4632; IV PIGGYBACK:2050]  Out: 7650 [Urine:7650]  I/O this shift:  In: 250 [IV PIGGYBACK:250]  Out: 200 [Urine:200]     General appearance: alert, appears stated age, and cooperative  Lungs: clear to auscultation bilaterally  Heart: regular rate and rhythm  Abdomen: soft, non-tender; bowel sounds normal; no masses,  no organomegaly  Extremities: extremities normal, atraumatic, no cyanosis or edema     Lab Results   Component Value Date    WBC 10.3 05/15/2024    RBC 3.08 05/15/2024    HGB 8.5 05/15/2024    HCT 25.8 05/15/2024    MCV 83.8 05/15/2024    MCH 27.6 05/15/2024    MCHC 32.9 05/15/2024    RDW 13.7 05/15/2024    .0 05/15/2024     Lab Results   Component Value Date     05/15/2024    K 3.0 05/15/2024    K 3.0 05/15/2024     05/15/2024    CO2 18.0 05/15/2024    BUN 30 05/15/2024     CREATSERUM 1.26 05/15/2024     05/15/2024    CA 8.3 05/15/2024    ALKPHO 83 05/15/2024    ALT 34 05/15/2024    AST 19 05/15/2024    BILT 0.3 05/15/2024    ALB 1.9 05/15/2024    TP 6.7 05/15/2024     Lab Results   Component Value Date    INR 1.16 05/14/2024         Imaging: reviewed. Per EMR.     Assessment/Plan:  Septic shock from UTI and multifocal pneumonia. +MRSA bacteremia  - Wean pressors as tolerated for MAP >65  - Trend lactate  - echo to r/o septic emboli/endocarditis pending  - Cont IV fluids  - ID consulted  UTI - moderate bilateral hydronephrosis on CT imaging, unclear if this is related to neobladder creation vs intrarenal obstruction or from sepsis  - Cefepime + IV vanco   - Urine culture pending  ELENA - appears acute, likely from sepsis and possibly obstruction.   - cont with gentle hydration with bicarb gtt  - monitor UO/lytes, bicarb  Hydronephrosis  - Renal US 2-3 days  - Rodriguez catheter for decompression  - UTI treatment with abx as above  Bladder CA - recent orthotopic neobladder creation at [facility] in March 2024 for BCG unresponsive bladder CA  - Per urology  Pulmonary sarcoidosis - unclear history, there are multiple nodules on CT chest. May be sarcoid related vs infection  - Abx as above  - Will montior  - Hold on steroids  - OP monitoring  Cardiomyopathy, nonischemic  - Echo pending  - Monitor for fluid overload  NAGMA, possibly from urinary losses vs RTA  - responding favorably to bicarb gtt  - Repeat labs serially  F/E/N- ADAT, lytes prn, gentle hydration  Proph - Heparin, SCD  Dispo- Full code  - Critically ill     Critical Care Time: 35 minutes    Justice Solano MD  5/15/2024  10:25 AM

## 2024-05-15 NOTE — PROGRESS NOTES
[facility] Hospitalist Progress Note     CC: Hospital Follow up    PCP: Antione Valadez DO       Assessment/Plan:     Principal Problem:    Sepsis, due to unspecified organism, unspecified whether acute organ dysfunction present (HCC)  Active Problems:    Anemia    Azotemia    Leukocytosis    Hyperglycemia    Metabolic acidosis    Patient is a 59 year old male with PMH sig for bladder cancer status post neobladder creation in March, nonischemic cardiomyopathy chronic low back pain status post spinal stimulator, pulmonary sarcoidosis who presents for evaluation of 1 week of generalized weakness, some shortness of breath, cough.  Patient managed for septic shock secondary to MRSA bacteremia.     Septic shock  Secondary to UTI and possible multifocal pneumonia  MRSA bacteremia  - IV pressors, wean as tolerated  - IVF  - Lactate within normal range  - 2D echo-no evidence of endocarditis  - Empiric cefepime and azithromycin, added vancomycin  - Blood cultures 2 out of 2 positive for MRSA, urine cultures pending, repeat blood cultures  - ID on consult, appreciate recommendations  - ICU management     UTI  - Abnormal UA, await cultures  - CT evidence of hydronephrosis, bilateral  - In the setting of recent neobladder creation  - Continue IV cefepime and azithromycin  - Urology on consult, appreciate input  - Maintain Rodriguez     ELENA  - In the setting of UTI and sepsis  - Follow BMP, slightly improved     History of bladder cancer  - Status post recent neobladder creation at [facility] in March 2024  - Follows with [facility] urologist     History of pulmonary sarcoidosis  - Multiple nodules on CT chest, unclear if infection versus sarcoid related  - Plan to treat with IV antibiotics for now, may need outpatient monitoring with imaging  - Has chronic shortness of breath, currently bacteremic no indication for steroids at this time     Nonischemic cardiomyopathy  - Echo without any vegetation, normal EF  - Can resume aspirin and statin once out  of the ICU     Hypertension  - Hold antihypertensives, on pressors     Hyperlipidemia  - Hold statin for now     Acidosis, nongap  - Presenting bicarb 10, in the setting of sepsis  - Bicarb drip per intensivist     Type 2 diabetes with hyperglycemia  - Hold home insulin  - Accu-Cheks, increased to medium dose ISS, may need long-acting coverage when sepsis improves     FN:  - IVF: Bicarb drip  - Diet: Diabetic     DVT Prophy: SCDs, heparin subcu  Atrophy: Ambulate as tolerated  Lines: PIV     Dispo: ICU  Outpatient records or previous hospital records reviewed.      Further recommendations pending patient's clinical course.  [facility] hospitalist to continue to follow patient while in house     Patient and/or patient's family given opportunity to ask questions and note understanding and agreeing with therapeutic plan as outlined     Thank You,  DO Lei [facility] Hospitalist  Answering Service number: [phone]     Subjective:     Seen and examined in the ICU.  He is awake and alert, answering questions.  Febrile overnight.  Still requiring small dose of Levophed, remains on bicarb drip.  Feels better than yesterday but still feels very weak.  States that he has had chronic shortness of breath but it has gotten worse in the last few days.    OBJECTIVE:    Blood pressure 102/48, pulse 89, temperature (!) 102 °F (38.9 °C), resp. rate 20, height 5' 5\" (1.651 m), weight 166 lb 3.6 oz (75.4 kg), SpO2 99%.    Temp:  [99.6 °F (37.6 °C)-102 °F (38.9 °C)] 102 °F (38.9 °C)  Pulse:  [] 89  Resp:  [14-41] 20  BP: ()/(42-80) 102/48  SpO2:  [96 %-100 %] 99 %      Intake/Output:    Intake/Output Summary (Last 24 hours) at 5/15/2024 1400  Last data filed at 5/15/2024 1310  Gross per 24 hour   Intake 6322 ml   Output 6050 ml   Net 272 ml       Last 3 Weights   05/15/24 0500 166 lb 3.6 oz (75.4 kg)   05/14/24 1149 160 lb 0.9 oz (72.6 kg)   05/14/24 1110 160 lb 0.9 oz (72.6 kg)   05/14/24 0454 174 lb (78.9 kg)    06/22/22 0645 170 lb (77.1 kg)   06/21/22 0831 169 lb (76.7 kg)   04/05/22 1450 175 lb (79.4 kg)       Exam   Gen: No acute distress, alert and oriented x3, no focal neurologic deficits, appears tired, central line in place  Heent: NC AT, mucous memb clear, neck supple  Pulm: Limited exam, diminished, supplemental oxygen,  CV: Heart with regular rate and rhythm, no peripheral edema  Abd: Abdomen soft, nontender, nondistended, bowel sounds present  MSK: Moving all extremities spontaneously, gait not assessed  Skin: no rashes or lesions  Neuro: AO*3, motor intact, no sensory deficits  Psyc: appropriate mood and affect      Data Review:       Labs:     Recent Labs   Lab 05/14/24  0523 05/14/24  1354 05/15/24  0353   RBC 3.67* 3.27* 3.08*   HGB 10.5* 9.3* 8.5*   HCT 30.7* 27.4* 25.8*   MCV 83.7 83.8 83.8   MCH 28.6 28.4 27.6   MCHC 34.2 33.9 32.9   RDW 13.7 14.0 13.7   NEPRELIM 21.86* 18.71* 9.44*   WBC 23.3* 19.8* 10.3   .0 214.0 170.0         Recent Labs   Lab 05/14/24  1856 05/15/24  0353 05/15/24  1057   * 224* 367*   BUN 38* 30* 26*   CREATSERUM 1.45* 1.26 1.34*   EGFRCR 55* 65 60   CA 8.4* 8.3* 7.7*    139 138   K 3.0* 3.0*  3.0* 3.0*   * 114* 110   CO2 14.0* 18.0* 20.0*       Recent Labs   Lab 05/14/24  0524 05/14/24  1354 05/15/24  0353   ALT 40 38 34   AST 18 20 19   ALB 2.5* 2.2* 1.9*         Imaging:  XR CHEST AP PORTABLE  (CPT=71045)    Result Date: 5/14/2024  CONCLUSION:    Stable moderate elevation right diaphragm.  Central venous catheter placed via the right jugular approach tip in the SVC no pneumothorax.  Stable heart size.  No CHF or pleural effusion. LOCATION:  [facility]      Dictated by (CST): Tarun Castellanos MD on 5/14/2024 at 10:09 AM     Finalized by (CST): Tarun Castellanos MD on 5/14/2024 at 10:10 AM       CT BRAIN OR HEAD (73037)    Result Date: 5/14/2024  CONCLUSION:  No acute process.    LOCATION:  [facility]   Dictated by (CST): Tarun Castellanos MD on 5/14/2024 at 7:46 AM      Finalized by (CST): Tarun Castellanos MD on 5/14/2024 at 7:46 AM       CTA CHEST + CT ABD (W) + CT PEL (W) SH(CPT=71275/97702)    Result Date: 5/14/2024  CONCLUSION:   1. Pulmonary artery evaluation limited by bolus opacification, no large or central pulmonary artery embolism identified.  2. Multifocal inflammatory appearing changes, including multifocal areas of inflammatory appearing nodularity along, and reticular nodular interstitial changes.  Signs of airways inflammation are also present.  Concern for atypical infection.  No pleural  effusion or pneumothorax.  3. Reactive appearing lymph nodes in the chest.  4. Bilateral hydronephrosis and hydroureter.  Moderately dilated irregular shaped urinary bladder with areas of wall thickening.  No bladder stone or bladder gas collections.  Urology referral for these findings advised.  5. Renal cysts are present, but in addition to defined measurable cysts, there are smaller poorly defined areas of low attenuation not typical for cyst, and could reflect areas of pyelonephritis.    LOCATION:  [facility]   Dictated by (CST): Tarun Castellanos MD on 5/14/2024 at 7:37 AM     Finalized by (CST): Tarun Castellanos MD on 5/14/2024 at 7:45 AM       XR CHEST AP PORTABLE  (CPT=71045)    Result Date: 5/14/2024  CONCLUSION:  1. Minimal right basilar atelectasis. 2. Preliminary report was provided by [facility] at time of examination.  Final report confirms findings on preliminary report without discrepancy.    LOCATION:  [facility]      Dictated by (CST): Quintin Rossi MD on 5/14/2024 at 7:40 AM     Finalized by (CST): Quintin Rossi MD on 5/14/2024 at 7:41 AM          Meds:      vancomycin  1,250 mg Intravenous Q12H    cefepime  2 g Intravenous Q12H    azithromycin  500 mg Intravenous Q24H    insulin aspart  1-5 Units Subcutaneous TID AC and HS    enoxaparin  40 mg Subcutaneous Daily      norepinephrine Stopped (05/15/24 1315)    sodium bicarbonate in D5W infusion 150 mEq  (05/15/24 1127)       acetaminophen    acetaminophen    glucose **OR** glucose **OR** glucose-vitamin C **OR** dextrose **OR** glucose **OR** glucose **OR** glucose-vitamin C    ondansetron    prochlorperazine    polyethylene glycol (PEG 3350)    sennosides    bisacodyl    fleet enema    melatonin

## 2024-05-15 NOTE — PROGRESS NOTES
[facility]  Urology Progress Note    Yunior Garrett Patient Status:  Inpatient    1965 MRN [id number]   Location [facility] 4SW-A Attending Adrienne Chahal DO   Hosp Day # 1 PCP Antione Valadez DO     Subjective:  Yunior Garrett is a(n) 59 year old male.    Current complaints: Off pressors.  +fevers.  Chills yesterday.  No abdominal pain.      Just had cardiac echo    Wife at bedside.    Objective:  General appearance: no distress  BP 93/48   Pulse 93   Temp (!) 100.5 °F (38.1 °C) (Temporal)   Resp 24   Ht 5' 5\" (1.651 m)   Wt 166 lb 3.6 oz (75.4 kg)   SpO2 97%   BMI 27.66 kg/m²   Lungs: non-labored respirations  Abdomen: soft, non-tender  Rodriguez catheter in place draining yellow urine with some sediment (UOP - 7650 mL/24 hours)  Lab Results   Component Value Date    WBC 10.3 05/15/2024    HGB 8.5 05/15/2024    HCT 25.8 05/15/2024    .0 05/15/2024    CREATSERUM 1.26 05/15/2024    BUN 30 05/15/2024     05/15/2024    K 3.0 05/15/2024    K 3.0 05/15/2024     05/15/2024    CO2 18.0 05/15/2024     05/15/2024    CA 8.3 05/15/2024    ALB 1.9 05/15/2024    ALKPHO 83 05/15/2024    BILT 0.3 05/15/2024    TP 6.7 05/15/2024    AST 19 05/15/2024    ALT 34 05/15/2024    PGLU 259 05/15/2024       Hospital Encounter on 24   1. Blood Culture     Status: Abnormal (Preliminary result)    Collection Time: 24  5:32 AM    Specimen: Blood,peripheral   Result Value Ref Range    Blood Culture Result Positive N/A    Blood Smear Positive Blood Culture (A) N/A    Blood Smear Gram positive cocci in clusters (A) N/A        XR CHEST AP PORTABLE  (CPT=71045)    Result Date: 2024  CONCLUSION:    Stable moderate elevation right diaphragm.  Central venous catheter placed via the right jugular approach tip in the SVC no pneumothorax.  Stable heart size.  No CHF or pleural effusion. LOCATION:  [facility]      Dictated by (CST): Tarun Castellanos MD on 2024 at 10:09 AM      Finalized by (CST): Tarun Castellanos MD on 5/14/2024 at 10:10 AM       CT BRAIN OR HEAD ([id number])    Result Date: 5/14/2024  CONCLUSION:  No acute process.    LOCATION:  [facility]   Dictated by (CST): Tarun Castellanos MD on 5/14/2024 at 7:46 AM     Finalized by (CST): Tarun Castellanos MD on 5/14/2024 at 7:46 AM       CTA CHEST + CT ABD (W) + CT PEL (W) SH(CPT=71275/04113)    Result Date: 5/14/2024  CONCLUSION:   1. Pulmonary artery evaluation limited by bolus opacification, no large or central pulmonary artery embolism identified.  2. Multifocal inflammatory appearing changes, including multifocal areas of inflammatory appearing nodularity along, and reticular nodular interstitial changes.  Signs of airways inflammation are also present.  Concern for atypical infection.  No pleural  effusion or pneumothorax.  3. Reactive appearing lymph nodes in the chest.  4. Bilateral hydronephrosis and hydroureter.  Moderately dilated irregular shaped urinary bladder with areas of wall thickening.  No bladder stone or bladder gas collections.  Urology referral for these findings advised.  5. Renal cysts are present, but in addition to defined measurable cysts, there are smaller poorly defined areas of low attenuation not typical for cyst, and could reflect areas of pyelonephritis.    LOCATION:  [facility]   Dictated by (CST): Tarun Castellanos MD on 5/14/2024 at 7:37 AM     Finalized by (CST): Tarun Castellanos MD on 5/14/2024 at 7:45 AM       XR CHEST AP PORTABLE  (CPT=71045)    Result Date: 5/14/2024  CONCLUSION:  1. Minimal right basilar atelectasis. 2. Preliminary report was provided by [facility] at time of examination.  Final report confirms findings on preliminary report without discrepancy.    LOCATION:  [facility]      Dictated by (CST): Quintin Rossi MD on 5/14/2024 at 7:40 AM     Finalized by (CST): Quintin Rossi MD on 5/14/2024 at 7:41 AM         Assessment:    Septic shock  UTI  Bacteremia  Bilateral  hydronephrosis/hydroureter  ELENA     Tmax 101.8 on 5/14, Tmax 101.3 so far today  Lactic acid 1.2 > 1.1  WBC 23.3 > 19.8 > 10.3  Hgb 10.5 > 9.3 > 8.5  Platelet count 244 > 214 > 170  Serum creat 1.84 > 1.43 > 1.45 > 1.26  UA 5/14/24: >50 WBC/hpf, 6-10 RBC/hpf, rare bacteria  Urine culture 5/14/24: pending  2./2 blood cultures 5/14/24: prelim gram positive cocci in clusters  Blood culture ID PCR 5/14/24: prelim Staphylococcus aureus, MRS by PCR  SARS-CoV-2/Flu A and B/RSV by PCR 5/14/24: negative  CTA chest, CT abdomen/pelvis 5/14/24: Pulmonary artery evaluation limited by bolus opacification, no large or central pulmonary artery embolism identified.  Multifocal inflammatory appearing changes, including multifocal areas of inflammatory appearing nodularity along, and reticular nodular interstitial changes.  Signs of airways inflammation are also present.  Concern for atypical infection.  No pleural effusion or pneumothorax.Reactive appearing lymph nodes in the chest.  Bilateral hydronephrosis and hydroureter.  Moderately dilated irregular shaped urinary bladder with areas of wall thickening.  No bladder stone or bladder gas collections. Urology referral for these findings advised. Renal cysts are present, but in addition to defined measurable cysts, there are smaller poorly defined areas of low attenuation not typical for cyst, and could reflect areas of pyelonephritis.     Cardona catheter placed - 1200 mL drained.       History of BCG unresponsive T1/Tcis bladder cancer   Status post radical cystectomy orthotopic neobladder on 3/14/24.     Plan:    -Check final cultures   -Abx per ID - ID consulted  -Follow labs, temp, UOP  -CPM with cardona catheter  -Renal US in 2 days  -Further management per ICU team  -Patient to follow-up with his established urologist (Dr. Yost) after discharge.       Above discussed with patient, wife, nurse, Dr. Palafox.    BREA Good [place] and [facility] [place]  Department of  Urology  5/15/2024  8:25 AM

## 2024-05-15 NOTE — PROGRESS NOTES
[facility] Pharmacy Dosing Service      Initial Pharmacokinetic Consult for Vancomycin Dosing     Yunior Garrett is a 59 year old male who is being initiated on vancomycin therapy for  MRSA Bacteremia .  Pharmacy has been asked to dose vancomycin by Khai ALVARENGA.  The initial treatment and monitoring approach will be first dose AUC strategy.        Weight and Temperature:    Wt Readings from Last 1 Encounters:   24 72.6 kg (160 lb 0.9 oz)        Temp Readings from Last 1 Encounters:   05/15/24 (!) 101.3 °F (38.5 °C) (Temporal)      Labs:   Recent Labs   Lab 24  0524 24  1354 24  1856   CREATSERUM 1.84* 1.43* 1.45*      Estimated Creatinine Clearance: 46.5 mL/min (A) (based on SCr of 1.45 mg/dL (H)).     Recent Labs   Lab 24  0523 24  1354   WBC 23.3* 19.8*          The Pharmacokinetic Target is:     to 600 mg-h/L and trough <=15 mg/L    Renal Dosing Considerations:    ELENA/ARF     Assessment/Plan:   Initial/Loading dose: Has received 1750 mg IV (25 mg/kg, capped at 2250 mg) x 1 loading dose.      Maintenance dose: Pharmacy will dose vancomycin at 1000 mg IV every 12 hours    Monitorin) Plan for vancomycin peak and trough to be obtained after the loading dose    2) Pharmacy will order SCr as clinically indicated to assess renal function.    3) Pharmacy will monitor for toxicity and efficacy, adjust vancomycin dose and/or frequency, and order vancomycin levels as appropriate per the Pharmacy and Therapeutics Committee approved protocol until discontinuation of the medication.       We appreciate the opportunity to assist in the care of this patient.     Juan Bragg, [facility]  5/15/2024  4:14 AM  [facility]  Pharmacy Extension: [phone]

## 2024-05-16 LAB
ANION GAP SERPL CALC-SCNC: 2 MMOL/L (ref 0–18)
BACTERIA BLD CULT: POSITIVE
BACTERIA BLD CULT: POSITIVE
BASOPHILS # BLD AUTO: 0 X10(3) UL (ref 0–0.2)
BASOPHILS # BLD AUTO: 0.01 X10(3) UL (ref 0–0.2)
BASOPHILS NFR BLD AUTO: 0 %
BASOPHILS NFR BLD AUTO: 0.1 %
BILIRUB UR QL STRIP.AUTO: NEGATIVE
BUN BLD-MCNC: 18 MG/DL (ref 9–23)
CALCIUM BLD-MCNC: 7.6 MG/DL (ref 8.5–10.1)
CHLORIDE SERPL-SCNC: 107 MMOL/L (ref 98–112)
CLARITY UR REFRACT.AUTO: CLEAR
CO2 SERPL-SCNC: 32 MMOL/L (ref 21–32)
COLOR UR AUTO: COLORLESS
CREAT BLD-MCNC: 0.94 MG/DL
EGFRCR SERPLBLD CKD-EPI 2021: 92 ML/MIN/1.73M2 (ref 60–?)
EOSINOPHIL # BLD AUTO: 0.07 X10(3) UL (ref 0–0.7)
EOSINOPHIL # BLD AUTO: 0.08 X10(3) UL (ref 0–0.7)
EOSINOPHIL NFR BLD AUTO: 1 %
EOSINOPHIL NFR BLD AUTO: 1.2 %
ERYTHROCYTE [DISTWIDTH] IN BLOOD BY AUTOMATED COUNT: 13.6 %
ERYTHROCYTE [DISTWIDTH] IN BLOOD BY AUTOMATED COUNT: 13.9 %
GLUCOSE BLD-MCNC: 123 MG/DL (ref 70–99)
GLUCOSE BLD-MCNC: 124 MG/DL (ref 70–99)
GLUCOSE BLD-MCNC: 124 MG/DL (ref 70–99)
GLUCOSE BLD-MCNC: 142 MG/DL (ref 70–99)
GLUCOSE BLD-MCNC: 180 MG/DL (ref 70–99)
GLUCOSE UR STRIP.AUTO-MCNC: >1000 MG/DL
HCT VFR BLD AUTO: 22.5 %
HCT VFR BLD AUTO: 24.3 %
HGB BLD-MCNC: 7.5 G/DL
HGB BLD-MCNC: 8.3 G/DL
IMM GRANULOCYTES # BLD AUTO: 0.02 X10(3) UL (ref 0–1)
IMM GRANULOCYTES # BLD AUTO: 0.03 X10(3) UL (ref 0–1)
IMM GRANULOCYTES NFR BLD: 0.3 %
IMM GRANULOCYTES NFR BLD: 0.4 %
KETONES UR STRIP.AUTO-MCNC: NEGATIVE MG/DL
LEUKOCYTE ESTERASE UR QL STRIP.AUTO: 75
LYMPHOCYTES # BLD AUTO: 0.39 X10(3) UL (ref 1–4)
LYMPHOCYTES # BLD AUTO: 0.43 X10(3) UL (ref 1–4)
LYMPHOCYTES NFR BLD AUTO: 6.1 %
LYMPHOCYTES NFR BLD AUTO: 6.1 %
MCH RBC QN AUTO: 28 PG (ref 26–34)
MCH RBC QN AUTO: 28.5 PG (ref 26–34)
MCHC RBC AUTO-ENTMCNC: 33.3 G/DL (ref 31–37)
MCHC RBC AUTO-ENTMCNC: 34.2 G/DL (ref 31–37)
MCV RBC AUTO: 83.5 FL
MCV RBC AUTO: 84 FL
MONOCYTES # BLD AUTO: 0.48 X10(3) UL (ref 0.1–1)
MONOCYTES # BLD AUTO: 0.48 X10(3) UL (ref 0.1–1)
MONOCYTES NFR BLD AUTO: 6.8 %
MONOCYTES NFR BLD AUTO: 7.5 %
NEUTROPHILS # BLD AUTO: 5.47 X10 (3) UL (ref 1.5–7.7)
NEUTROPHILS # BLD AUTO: 5.47 X10(3) UL (ref 1.5–7.7)
NEUTROPHILS # BLD AUTO: 6.05 X10 (3) UL (ref 1.5–7.7)
NEUTROPHILS # BLD AUTO: 6.05 X10(3) UL (ref 1.5–7.7)
NEUTROPHILS NFR BLD AUTO: 84.9 %
NEUTROPHILS NFR BLD AUTO: 85.6 %
NITRITE UR QL STRIP.AUTO: NEGATIVE
OSMOLALITY SERPL CALC.SUM OF ELEC: 295 MOSM/KG (ref 275–295)
PH UR STRIP.AUTO: 6.5 [PH] (ref 5–8)
PLATELET # BLD AUTO: 122 10(3)UL (ref 150–450)
PLATELET # BLD AUTO: 128 10(3)UL (ref 150–450)
POTASSIUM SERPL-SCNC: 3 MMOL/L (ref 3.5–5.1)
POTASSIUM SERPL-SCNC: 3.2 MMOL/L (ref 3.5–5.1)
POTASSIUM SERPL-SCNC: 3.4 MMOL/L (ref 3.5–5.1)
POTASSIUM SERPL-SCNC: 4.2 MMOL/L (ref 3.5–5.1)
PROT UR STRIP.AUTO-MCNC: 20 MG/DL
RBC # BLD AUTO: 2.68 X10(6)UL
RBC # BLD AUTO: 2.91 X10(6)UL
SODIUM SERPL-SCNC: 141 MMOL/L (ref 136–145)
SP GR UR STRIP.AUTO: 1.01 (ref 1–1.03)
UROBILINOGEN UR STRIP.AUTO-MCNC: NORMAL MG/DL
WBC # BLD AUTO: 6.4 X10(3) UL (ref 4–11)
WBC # BLD AUTO: 7.1 X10(3) UL (ref 4–11)

## 2024-05-16 PROCEDURE — 0J980ZZ DRAINAGE OF ABDOMEN SUBCUTANEOUS TISSUE AND FASCIA, OPEN APPROACH: ICD-10-PCS | Performed by: UROLOGY

## 2024-05-16 PROCEDURE — 99233 SBSQ HOSP IP/OBS HIGH 50: CPT | Performed by: INTERNAL MEDICINE

## 2024-05-16 RX ORDER — LIDOCAINE HYDROCHLORIDE 20 MG/ML
10 INJECTION, SOLUTION INFILTRATION; PERINEURAL ONCE
Status: COMPLETED | OUTPATIENT
Start: 2024-05-16 | End: 2024-05-16

## 2024-05-16 RX ORDER — POTASSIUM CHLORIDE 20 MEQ/1
40 TABLET, EXTENDED RELEASE ORAL EVERY 4 HOURS
Status: COMPLETED | OUTPATIENT
Start: 2024-05-16 | End: 2024-05-16

## 2024-05-16 RX ORDER — HYDROCODONE BITARTRATE AND ACETAMINOPHEN 5; 325 MG/1; MG/1
1 TABLET ORAL EVERY 6 HOURS PRN
Status: DISCONTINUED | OUTPATIENT
Start: 2024-05-16 | End: 2024-05-22

## 2024-05-16 NOTE — PLAN OF CARE
Received pt aox4, follows commands, complains of abdomen/ suprapubic pain. MD aware. PRN norco given. Rodriguez irrigated per order x3. Pt febrile 103, MD notified. PRN tylenol given and ice packs applied. Pt laying flat after central line removal.

## 2024-05-16 NOTE — PLAN OF CARE
Patient alert oriented, able to make needs known  No complaints of pain  Febrile, prn tylenol given  K+ replaced per protocol  Blood pressure within normal limits this shift.       Problem: Diabetes/Glucose Control  Goal: Glucose maintained within prescribed range  Description: INTERVENTIONS:  - Monitor Blood Glucose as ordered  - Assess for signs and symptoms of hyperglycemia and hypoglycemia  - Administer ordered medications to maintain glucose within target range  - Assess barriers to adequate nutritional intake and initiate nutrition consult as needed  - Instruct patient on self management of diabetes  Outcome: Progressing     Problem: PAIN - ADULT  Goal: Verbalizes/displays adequate comfort level or patient's stated pain goal  Description: INTERVENTIONS:  - Encourage pt to monitor pain and request assistance  - Assess pain using appropriate pain scale  - Administer analgesics based on type and severity of pain and evaluate response  - Implement non-pharmacological measures as appropriate and evaluate response  - Consider cultural and social influences on pain and pain management  - Manage/alleviate anxiety  - Utilize distraction and/or relaxation techniques  - Monitor for opioid side effects  - Notify MD/LIP if interventions unsuccessful or patient reports new pain  - Anticipate increased pain with activity and pre-medicate as appropriate  Outcome: Progressing     Problem: RISK FOR INFECTION - ADULT  Goal: Absence of fever/infection during anticipated neutropenic period  Description: INTERVENTIONS  - Monitor WBC  - Administer growth factors as ordered  - Implement neutropenic guidelines  Outcome: Not Progressing

## 2024-05-16 NOTE — PROGRESS NOTES
[facility]   part of [facility] Infectious Disease  Progress Note    Yunior Garrett Patient Status:  Inpatient    1965 MRN [id number]   Location [facility] 4SW-A Attending Adrienne Chahal DO   Hosp Day # 2 PCP Antione Valadez DO     Subjective:    Pt seen and examined resting in bed, family at bedside. He is tolerating the IV abx, feeling better since admit. Does report suprapubic pain. RN at bedside patient has been of pressors since yesterday afternoon. Still with fevers     Review of Systems:  Review of systems reviewed and negative except as mentioned    Objective:  Blood pressure 119/57, pulse 82, temperature 100.3 °F (37.9 °C), resp. rate 26, height 5' 5\" (1.651 m), weight 166 lb 3.6 oz (75.4 kg), SpO2 96%.      Physical Exam:  General: Awake, alert, non-tox, NAD.  HEENT:  Oropharynx clear, trachea ML.  Heart: RRR S1S2 no murmurs.  Lungs: Essentially CTA b/l, no rhonchi, rales, wheezes.  Abdomen: Soft, ND, +suprapubic ttp, right suprapubic area with ?ingrown hair surrounding erythema   Extremity: No edema.  Neurological: No focal deficits.  Derm:  Warm, dry, free from rashes.    Lab Data Review:  Lab Results   Component Value Date    WBC 6.4 2024    HGB 7.5 2024    HCT 22.5 2024    .0 2024    CREATSERUM 0.94 2024    BUN 18 2024     2024    K 3.2 2024     2024    CO2 32.0 2024     2024    CA 7.6 2024    MG 1.9 05/15/2024        Cultures:  Hospital Encounter on 24   1. Urine Culture, Routine     Status: None    Collection Time: 24  9:13 AM    Specimen: Urine, clean catch   Result Value Ref Range    Urine Culture  N/A     <10,000 cfu/ml Multiple species present- probable contamination.   2. Blood Culture     Status: Abnormal    Collection Time: 24  5:32 AM    Specimen: Blood,peripheral   Result Value Ref Range    Blood Culture Result Positive N/A     Blood Culture Result Staphylococcus aureus (A) N/A    Blood Smear Positive Blood Culture (A) N/A    Blood Smear Gram positive cocci in clusters (A) N/A        Radiology:  XR CHEST AP PORTABLE  (CPT=71045)    Result Date: 5/14/2024  CONCLUSION:    Stable moderate elevation right diaphragm.  Central venous catheter placed via the right jugular approach tip in the SVC no pneumothorax.  Stable heart size.  No CHF or pleural effusion. LOCATION:  [facility]      Dictated by (CST): Tarun Castellanos MD on 5/14/2024 at 10:09 AM     Finalized by (CST): Tarun Castellanos MD on 5/14/2024 at 10:10 AM       CT BRAIN OR HEAD ([id number])    Result Date: 5/14/2024  CONCLUSION:  No acute process.    LOCATION:  [facility]   Dictated by (CST): Tarun Castellanos MD on 5/14/2024 at 7:46 AM     Finalized by (CST): Tarun Castellanos MD on 5/14/2024 at 7:46 AM       CTA CHEST + CT ABD (W) + CT PEL (W) SH(CPT=71275/49119)    Result Date: 5/14/2024  CONCLUSION:   1. Pulmonary artery evaluation limited by bolus opacification, no large or central pulmonary artery embolism identified.  2. Multifocal inflammatory appearing changes, including multifocal areas of inflammatory appearing nodularity along, and reticular nodular interstitial changes.  Signs of airways inflammation are also present.  Concern for atypical infection.  No pleural  effusion or pneumothorax.  3. Reactive appearing lymph nodes in the chest.  4. Bilateral hydronephrosis and hydroureter.  Moderately dilated irregular shaped urinary bladder with areas of wall thickening.  No bladder stone or bladder gas collections.  Urology referral for these findings advised.  5. Renal cysts are present, but in addition to defined measurable cysts, there are smaller poorly defined areas of low attenuation not typical for cyst, and could reflect areas of pyelonephritis.    LOCATION:  [facility]   Dictated by (CST): Tarun Castellanos MD on 5/14/2024 at 7:37 AM     Finalized by (CST): Tarun Castellanos MD on 5/14/2024  at 7:45 AM       XR CHEST AP PORTABLE  (CPT=71045)    Result Date: 5/14/2024  CONCLUSION:  1. Minimal right basilar atelectasis. 2. Preliminary report was provided by [facility] at time of examination.  Final report confirms findings on preliminary report without discrepancy.    LOCATION:  [facility]      Dictated by (CST): Quintin Rossi MD on 5/14/2024 at 7:40 AM     Finalized by (CST): Quintin Rossi MD on 5/14/2024 at 7:41 AM        Assessment and Plan:    MRSA sepsis  - ?  source  - 2/2 blood cultures on 5/14 with MRSA  - Repeat blood cultures from 5/15/24 pending  - UC with multiple species present-probable contamination   - TTE without evidence of endocarditis  - IV Vancomycin ongoing     Hx Bladder CA s/p neobladder creation 3/14  - CT with moderate bilateral hydronephrosis   - UA abnormal, UC with multiple species present-probable contamination   - urology following  - IV Vancomycin and cefepime ongoing     Hx pulmonary sarcoidosis  - CT with multiple pulmonary nodules, concern for atypical infection  - s/p IV Azithromycin  - IV abx as above    Fever  - 2/2 above  - monitor fever curve     Chronic back pain with spinal stimulator in place      Recommendations  - Continue IV Vancomycin, pharmacy to dose and cefepime as Rx, will repeat UA/UC and likely dc cefepime tomorrow  - Follow pending cultures  - Repeat labs tomorrow  - Monitor fever curve   - Further recommendations to follow     Plan of care discussed with Dr. Chow, he is in agreement with the plan of care.       If you have any questions or concerns please call [facility] Infectious Disease at [phone].     COLETTE Carrington    5/16/2024  9:12 AM

## 2024-05-16 NOTE — PROGRESS NOTES
[facility]    Yunior Garrett Patient Status:  Inpatient    1965 MRN [id number]   [facility] 4SW-A Attending Adrienne Chahal DO   Hosp Day # 2 PCP Antione Valadez DO     Critical Care Progress Note     Date of Admission: 2024  4:43 AM  Admission Diagnosis: Metabolic acidosis [E87.20]  Sepsis, due to unspecified organism, unspecified whether acute organ dysfunction present (HCC) [A41.9]     S: no new events.      Current Medications:    Current Facility-Administered Medications:     HYDROcodone-acetaminophen (Norco) 5-325 MG per tab 1 tablet, 1 tablet, Oral, Q6H PRN    vancomycin (Vancocin) 1.25 g in sodium chloride 0.9% 250mL IVPB premix, 1,250 mg, Intravenous, Q12H    insulin aspart (NovoLOG) 100 Units/mL FlexPen 2-10 Units, 2-10 Units, Subcutaneous, TID AC and HS    sodium chloride 0.9% infusion, , Intravenous, Continuous    norepinephrine (Levophed) 4 mg/250mL infusion premix, 0.5-30 mcg/min, Intravenous, Continuous    acetaminophen (Tylenol) tab 650 mg, 650 mg, Oral, Q6H PRN    ceFEPIme (Maxpime) 2 g in sodium chloride 0.9% 100 mL IVPB-MBP, 2 g, Intravenous, Q12H    glucose (Dex4) 15 GM/59ML oral liquid 15 g, 15 g, Oral, Q15 Min PRN **OR** glucose (Glutose) 40% oral gel 15 g, 15 g, Oral, Q15 Min PRN **OR** glucose-vitamin C (Dex-4) chewable tab 4 tablet, 4 tablet, Oral, Q15 Min PRN **OR** dextrose 50% injection 50 mL, 50 mL, Intravenous, Q15 Min PRN **OR** glucose (Dex4) 15 GM/59ML oral liquid 30 g, 30 g, Oral, Q15 Min PRN **OR** glucose (Glutose) 40% oral gel 30 g, 30 g, Oral, Q15 Min PRN **OR** glucose-vitamin C (Dex-4) chewable tab 8 tablet, 8 tablet, Oral, Q15 Min PRN    enoxaparin (Lovenox) 40 MG/0.4ML SUBQ injection 40 mg, 40 mg, Subcutaneous, Daily    ondansetron (Zofran) 4 MG/2ML injection 4 mg, 4 mg, Intravenous, Q6H PRN    prochlorperazine (Compazine) 10 MG/2ML injection 5 mg, 5 mg, Intravenous, Q8H PRN    polyethylene glycol (PEG 3350) (Miralax) 17 g oral packet 17  g, 17 g, Oral, Daily PRN    sennosides (Senokot) tab 17.2 mg, 17.2 mg, Oral, Nightly PRN    bisacodyl (Dulcolax) 10 MG rectal suppository 10 mg, 10 mg, Rectal, Daily PRN    fleet enema (Fleet) 7-19 GM/118ML rectal enema 133 mL, 1 enema, Rectal, Once PRN    melatonin tab 3 mg, 3 mg, Oral, Nightly PRN     OBJECTIVE:  /51   Pulse 92   Temp 99.2 °F (37.3 °C) (Temporal)   Resp 24   Ht 165.1 cm (5' 5\")   Wt 166 lb 3.6 oz (75.4 kg)   SpO2 97%   BMI 27.66 kg/m²      On room air      Wt Readings from Last 3 Encounters:   05/15/24 166 lb 3.6 oz (75.4 kg)   06/22/22 170 lb (77.1 kg)   04/05/22 175 lb (79.4 kg)        I/O last 3 completed shifts:  In: 8832 [P.O.:1900; I.V.:4582; IV PIGGYBACK:2350]  Out: 6250 [Urine:6250]  I/O this shift:  In: 490 [P.O.:240; IV PIGGYBACK:250]  Out: 450 [Urine:450]     General appearance: alert, appears stated age, cooperative, and no distress  Lungs: clear to auscultation bilaterally  Heart: regular rate and rhythm  Abdomen:  mild to mod tenderness with palpation, +BS  Extremities: extremities normal, atraumatic, no cyanosis or edema   Skin: R groin carbuncle vs early abscess with erythema/TD    Lab Results   Component Value Date    WBC 6.4 05/16/2024    RBC 2.68 05/16/2024    HGB 7.5 05/16/2024    HCT 22.5 05/16/2024    MCV 84.0 05/16/2024    MCH 28.0 05/16/2024    MCHC 33.3 05/16/2024    RDW 13.6 05/16/2024    .0 05/16/2024     Lab Results   Component Value Date     05/16/2024    K 3.4 05/16/2024     05/16/2024    CO2 32.0 05/16/2024    BUN 18 05/16/2024    CREATSERUM 0.94 05/16/2024     05/16/2024    CA 7.6 05/16/2024     Lab Results   Component Value Date    INR 1.16 05/14/2024           Imaging: reviewed. Per EMR     Assessment/Plan:  Septic shock from UTI and multifocal pneumonia. +MRSA bacteremia  - Weaned off pressors   - lactate normalized  - echo to r/o septic emboli/endocarditis was unremarkable  - Cont IV fluids  - ID following- abx per  them  UTI - moderate bilateral hydronephrosis on CT imaging, unclear if this is related to neobladder creation vs intrarenal obstruction or from sepsis  - Cefepime + IV vanco   - Urine culture pending  ELENA -  from sepsis - resolved. Creat back to normal  - stop bicarb gtt  - monitor UO/lytes  Hydronephrosis  - Renal US 2-3 days  - Rodriguez catheter for decompression  -  per urology  Bladder CA - recent orthotopic neobladder creation at [facility] in March 2024 for BCG unresponsive bladder CA  - Per urology  Skin carbuncle near surgical incision site- painful to touch.  Will defer mgt to urology/ID.  May need to be lanced if doesn't respond to abx.  Pulmonary sarcoidosis - unclear history, there are multiple nodules on CT chest. May be sarcoid related vs infection  - Abx as above  - Will montior  - Hold on steroids  - OP monitoring  Cardiomyopathy, nonischemic  - Echo unremarkable  NAGMA, possibly from urinary losses vs RTA  - resolved.  Off bicarb gtt  Heme- thrombocytopenia- likely hemodilution  - stop hep for now, check HIT  F/E/N- ADAT, lytes prn, gentle hydration  Proph - Heparin, SCD  Dispo- Full code  - transfer to floor.  Will follow in ICU only    Justice Solano MD  5/16/2024  10:47 AM

## 2024-05-16 NOTE — PROCEDURES
[facility]   OPERATIVE REPORT    Yunior Garrett Patient Status:  Inpatient    1965 MRN [id number]   Location [facility] 4SW-A Attending Adrienne Chahal DO   Hosp Day # 2 PCP Antione Valadez DO        DATE of OPERATION: 2024      PREOPERATIVE DIAGNOSIS: Suprapubic carbuncle    POSTOPERATIVE DIAGNOSIS: Same    PROCEDURE PERFORMED: Incision and Drainage of Suprapubic Carbuncle    ANESTHESIA: Local.     INDICATIONS: Pointing 1 cm suprapubic carbuncle of the right lower abdomen in a patient with sepsis and recent history of neobladder by Dr. Yost at [place].  He had bilateral hydronephrosis and obstructed neobladder which resolved with Rodriguez catheter placement and catheter irrigation.  He has a tender carbuncle of the lower abdomen/groin area.     FINDINGS: The carbuncle spontaneously drained as I prepped the area, but I did incise about 8 mm or so.  Sample of pus sent for culture.    EBL: Less than 1 mL    COMPLICATIONS: None     TECHNIQUE: This procedure was done at the bedside with the patient and his ICU bed.  The carbuncle was prepped with an alcohol wipe and as I rubbed the area the carbuncle opened and began to drain.  I infiltrated the area with 2% lidocaine and then made a puncture with a sterile knife 8 mm long.  About a total of 1 cc of pus came out.  A sample of this was aspirated and sent for culture specimen.  I irrigated the incision and then covered it with a small gauze.  The patient tolerated the procedure well.    MD [facility]  [phone]  2024  5:30 PM    curry

## 2024-05-16 NOTE — PAYOR COMM NOTE
5/14 THRU 5/16  ADMISSION REVIEW     Payor: [facility]  Subscriber #:  [id number]  Authorization Number: [id number]    Admit date: 5/14/24  Admit time: 11:03 AM       REVIEW DOCUMENTATION:     ED Provider Notes        ED Provider Notes signed by Max Puentes MD at 5/14/2024  3:21 PM       Author: Max Puentes MD Service: -- Author Type: Physician    Filed: 5/14/2024  3:21 PM Date of Service: 5/14/2024  5:07 AM Status: Signed    : Max Puentes MD (Physician)           Patient Seen in: [facility] Emergency Department      History     Chief Complaint   Patient presents with    Difficulty Breathing    Fall    Fatigue     Stated Complaint: arrived via EMS for c/o weakness, post fall out of bed, (+) MAVIS, feeling fatigu*    Subjective:   HPI    This is a 59-year-old gentleman, history of bladder cancer status post resection of bladder cancer and creation of neobladder 2 months ago here for evaluation of 1 week of generalized weakness, some shortness of breath slight cough over the past few days.  States today while getting up felt weak, fell down onto his outstretched hands did not strike his head.  Denies any vomiting any diarrhea.  States he has been urinating every 2 hours as he was instructed after creation of his neobladder in order to train his bladder.  Denies any pain with urination any blood in the urine any other complaints or concerns at this time denies any rash, focal abdominal pain any other symptoms.  Objective:   No pertinent past medical history.            No pertinent past surgical history.              No pertinent social history.            Review of Systems    Positive for stated complaint: arrived via EMS for c/o weakness, post fall out of bed, (+) MAVIS, feeling fatigu*  Other systems are as noted in HPI.  Constitutional and vital signs reviewed.      All other systems reviewed and negative except as noted above.    Physical Exam     ED Triage Vitals [05/14/24 0454]   /67    Pulse 115   Resp (!) 34   Temp (!) 101.7 °F (38.7 °C)   Temp src Oral   SpO2 100 %   O2 Device None (Room air)       Current Vitals:   Vital Signs  BP: 130/78  Pulse: 103  Resp: (!) 35  Temp: 99.5 °F (37.5 °C)  Temp src: Temporal  MAP (mmHg): 88    Oxygen Therapy  SpO2: 100 %  O2 Device: None (Room air)  Pulse Oximetry Type: Continuous  Oximetry Probe Site Changed: No  Pulse Ox Probe Location: Right hand            Physical Exam        Physical Exam  Vitals signs and nursing note reviewed.   General: Middle-age gentleman sitting up in bed appears fatigued.  Head: Normocephalic and atraumatic.   HEENT:  Mucous membranes are somewhat dry.  Cardiovascular:  Normal rate and regular rhythm.  No Edema  Pulmonary:  Pulmonary effort is normal.  Normal breath sounds. No wheezing, rhonchi or rales.   Abdominal: Soft nontender nondistended, normal bowel sounds, no guarding no rebound tenderness  Skin: Warm and dry  Neurological: Awake alert, speech is normal        ED Course     Labs Reviewed   COMP METABOLIC PANEL (14) - Abnormal; Notable for the following components:       Result Value    Glucose 280 (*)     Chloride 118 (*)     CO2 10.0 (*)     BUN 57 (*)     Creatinine 1.84 (*)     Calculated Osmolality 310 (*)     eGFR-Cr 41 (*)     Total Protein 8.3 (*)     Albumin 2.5 (*)     Globulin  5.8 (*)     A/G Ratio 0.4 (*)     All other components within normal limits   URINALYSIS WITH CULTURE REFLEX - Abnormal; Notable for the following components:    Urine Color Colorless (*)     Clarity Urine Turbid (*)     Glucose Urine >1000 (*)     Blood Urine 1+ (*)     Protein Urine 20 (*)     Leukocyte Esterase Urine 500 (*)     WBC Urine >50 (*)     RBC Urine 6-10 (*)     Bacteria Urine Rare (*)     Hyaline Casts Present (*)     All other components within normal limits   PROTHROMBIN TIME (PT) - Abnormal; Notable for the following components:    PT 14.9 (*)     All other components within normal limits   COMP METABOLIC PANEL (14) -  Abnormal; Notable for the following components:    Glucose 260 (*)     Chloride 120 (*)     CO2 9.0 (*)     BUN 41 (*)     Creatinine 1.43 (*)     Calculated Osmolality 305 (*)     eGFR-Cr 56 (*)     Albumin 2.2 (*)     Globulin  5.4 (*)     A/G Ratio 0.4 (*)     All other components within normal limits   ED/MRSA SCREEN BY PCR-CC - Abnormal; Notable for the following components:    MRSA Screen By PCR Positive (*)     All other components within normal limits    Narrative:     A positive result does not necessarily indicate the presence of viable organisms. For confirmation, epidemiological typing and susceptibility testing, appropriate culture is needed.    PLEASE REFER TO MRSA SCREENING PROTOCOL FOR TREATMENT.     CBC W/ DIFFERENTIAL - Abnormal; Notable for the following components:    WBC 23.3 (*)     RBC 3.67 (*)     HGB 10.5 (*)     HCT 30.7 (*)     Neutrophil Absolute Prelim 21.86 (*)     Neutrophil Absolute 21.86 (*)     Lymphocyte Absolute 0.16 (*)     Monocyte Absolute 1.07 (*)     All other components within normal limits   CBC W/ DIFFERENTIAL - Abnormal; Notable for the following components:    WBC 19.8 (*)     RBC 3.27 (*)     HGB 9.3 (*)     HCT 27.4 (*)     Neutrophil Absolute Prelim 18.71 (*)     Neutrophil Absolute 18.71 (*)     Lymphocyte Absolute 0.30 (*)     All other components within normal limits   TROPONIN I HIGH SENSITIVITY - Normal   TROPONIN I HIGH SENSITIVITY - Normal   LACTIC ACID, PLASMA - Normal   PTT, ACTIVATED - Normal   LACTIC ACID, PLASMA - Normal   SARS-COV-2/FLU A AND B/RSV BY PCR (GENEXPERT) - Normal    Narrative:     This test is intended for the qualitative detection and differentiation of SARS-CoV-2, influenza A, influenza B, and respiratory syncytial virus (RSV) viral RNA in nasopharyngeal or nares swabs from individuals suspected of respiratory viral infection consistent with COVID-19 by their healthcare provider. Signs and symptoms of respiratory viral infection due to  SARS-CoV-2, influenza, and RSV can be similar.    Test performed using the Xpert Xpress SARS-CoV-2/FLU/RSV (real time RT-PCR)  assay on the [employer] instrument, [employer], [place] [address].   This test is being used under the Food and Drug Administration's Emergency Use Authorization.    The authorized Fact Sheet for Healthcare Providers for this assay is available upon request from the laboratory.   CBC WITH DIFFERENTIAL WITH PLATELET    Narrative:     The following orders were created for panel order CBC With Differential With Platelet.  Procedure                               Abnormality         Status                     ---------                               -----------         ------                     CBC W/ DIFFERENTIAL[[id number]]          Abnormal            Final result                 Please view results for these tests on the individual orders.   CBC WITH DIFFERENTIAL WITH PLATELET    Narrative:     The following orders were created for panel order CBC With Differential With Platelet.  Procedure                               Abnormality         Status                     ---------                               -----------         ------                     CBC W/ DIFFERENTIAL[[id number]]          Abnormal            Final result                 Please view results for these tests on the individual orders.   RAINBOW DRAW LAVENDER   RAINBOW DRAW LIGHT GREEN   RAINBOW DRAW BLUE   RAINBOW DRAW GOLD   BLOOD CULTURE   BLOOD CULTURE   URINE CULTURE, ROUTINE     EKG    Rate, intervals and axes as noted on EKG Report.  Rate: 115  Rhythm: Sinus tach  Reading: Nonspecific changes no acute ischemic change                 Indication: The patient required placement of a central venous catheter for emergent venous access for purposes of laboratory evaluation, IV fluid administration, and /or medication administration.    After obtaining verbal and written consent, the patient was prepped for placement of a central  venous catheter.  Area of entry was prepped and draped in sterile fashion.  Physical landmarks were identified and confirmed by ultrasound imaging.  Lidocaine 1% was then infiltrated for local anesthetic.    Using the linear probe covered in a sterile sheath, a view of the vein was obtained via ultrasound.  The right internal jugular vein was noted to be completely compressible and was identified as separate from any adjacent non compressible arterial structures. Under real-time guidance, the introducer needle was observed to tent the visualized vein and then to puncture it.  There was positive return of dark non-pulsatile venous blood and J point wire was then inserted and advanced in a modified Seldinger technique.  The introducer needle was then removed and a #11 scalpel was then used to make a small incision to allow placement of the catheter with dilator. The triple lumen catheter was advanced over the wire to a depth of 15 cm into the superior vena cava vein and secured in place with sutures.  Blood was readily drawn back through all ports and ports were easily flushed.  Sterile dressing was then placed over the site.    Ultrasound images were captured for archival purposes.            MDM     59-year-old gentleman, recent bladder cancer resection and neobladder creation here for evaluation of fever generalized fatigue cough shortness of breath.  Differential includes sepsis, sepsis from urinary tract infection, intra-abdominal infection, viral syndrome, pneumonia.  Labs ordered and reviewed, significant for bicarb of 10, elevated white count to 23.  Patient received 30 cc/kg fluid bolus, was covered with cefepime and azithromycin for possible atypical pneumonia as identified on CT of the chest abdomen pelvis.  There is no pulmonary embolism identified, and it was noted that patient has hydronephrosis hydroureter and case was discussed with Dr. Palafox from urology who agrees with plan for Rodriguez catheter  placement to decompress.  Rodriguez catheter placed with 1200 cc urine out.  Patient did become hypotensive despite 30 cc/kg fluid bolus, Levophed was started peripherally with improvement in MAP, case discussed with [facility] intensivist who agrees with plan for admission to the ICU recommends bicarb drip, central line was placed to right IJ under ultrasound guidance.  Case was endorsed to the [facility] hospitalist who agrees with plan for admission.    I independently viewed and interpreted the following imaging: Postcentral line chest x-ray shows no pneumothorax.    Reviewed [facility] urology note from 3/29/2024, shows patient with history of bladder cancer, recent radical cystectomy, neobladder creation on 3/14/2024Admission disposition: 5/14/2024  6:44 AM                          Total critical care time: Approximately 85 minutes  Due to a high probability of clinically significant, life threatening deterioration, the patient required my highest level of preparedness to intervene emergently and I personally spent this critical care time directly and personally managing the patient. This critical care time included obtaining a history; examining the patient; pulse oximetry; ordering and review of studies; arranging urgent treatment with development of a management plan; evaluation of patient's response to treatment; frequent reassessment; and, discussions with other providers.  This critical care time was performed to assess and manage the high probability of imminent, life-threatening deterioration that could result in multi-organ failure. It was exclusive of separately billable procedures and treating other patients and teaching time.    [facility]   part of [facility]      Sepsis Reassessment Note    /67   Pulse 115   Temp (!) 101.7 °F (38.7 °C) (Oral)   Resp (!) 34   Ht 165.1 cm (5' 5\")   Wt 78.9 kg   SpO2 100%   BMI 28.96 kg/m²      I completed the sepsis reassessment at 1030    Cardiac:  Regularity:  Regular  Rate: Normal  Heart Sounds: S1,S2    Lungs:   Right: Clear  Left: Clear    Peripheral Pulses:  Radial: Right 1+ or Left 1+      Capillary Refill:  <3 Secs    Skin:  Temp/Moisture: Warm and Dry  Color: Normal      Max Puentes MD  5/14/2024  5:07 AM            Medical Decision Making      Disposition and Plan     Clinical Impression:  1. Sepsis, due to unspecified organism, unspecified whether acute organ dysfunction present (HCC)    2. Metabolic acidosis    3. Pneumonia due to infectious organism, unspecified laterality, unspecified part of lung    4. Septic shock (HCC)    5. Hydronephrosis, unspecified hydronephrosis type         Disposition:  Admit  5/14/2024  6:44 am    Follow-up:  No follow-up provider specified.        Medications Prescribed:  Current Discharge Medication List                            Hospital Problems       Present on Admission  Date Reviewed: 4/5/2022            ICD-10-CM Noted POA    * (Principal) Sepsis, due to unspecified organism, unspecified whether acute organ dysfunction present (HCC) A41.9 5/14/2024 Unknown    Anemia D64.9 5/14/2024 Yes    Azotemia R79.89 5/14/2024 Yes    Hyperglycemia R73.9 5/14/2024 Yes    Leukocytosis D72.829 5/14/2024 Yes    Metabolic acidosis E87.20 5/14/2024 Yes                     Signed by Max Puentes MD on 5/14/2024  3:21 PM         MEDICATIONS ADMINISTERED IN LAST 1 DAY:  acetaminophen (Tylenol) tab 650 mg       Date Action Dose Route User    5/16/2024 0154 Given 650 mg Oral Joann Weinberg RN    5/15/2024 2022 Given 650 mg Oral Joann Weinberg RN          azithromycin (Zithromax) 500 mg in sodium chloride 0.9% 250mL IVPB premix       Date Action Dose Route User    5/16/2024 0844 New Bag 500 mg Intravenous Kayla Rodriguez RN          ceFEPIme (Maxpime) 2 g in sodium chloride 0.9% 100 mL IVPB-MBP       Date Action Dose Route User    5/16/2024 0544 New Bag 2 g Intravenous Joann Weinberg RN    5/15/2024 1851 New Bag 2 g Intravenous  Crispin Vyas RN          ceFEPIme (Maxpime) 2 g in sodium chloride 0.9% 100 mL IVPB-MBP       Date Action Dose Route User    5/16/2024 1305 New Bag 2 g Intravenous Kayla Rodriguez RN          enoxaparin (Lovenox) 40 MG/0.4ML SUBQ injection 40 mg       Date Action Dose Route User    5/16/2024 0843 Given 40 mg Subcutaneous (Right Lower Abdomen) Kayla Rodriguez RN          HYDROcodone-acetaminophen (Norco) 5-325 MG per tab 1 tablet       Date Action Dose Route User    5/16/2024 1005 Given 1 tablet Oral Kayla Rodriguez RN          insulin aspart (NovoLOG) 100 Units/mL FlexPen 2-10 Units       Date Action Dose Route User    5/16/2024 1147 Given 2 Units Subcutaneous (Right Upper Arm) Kayla Rodriguez RN    5/15/2024 2009 Given 6 Units Subcutaneous (Left Upper Arm) Joann Weinberg RN    5/15/2024 1607 Given 8 Units Subcutaneous (Left Lower Abdomen) Crispin Vyas RN          pantoprazole (Protonix) 40 mg in sodium chloride 0.9% PF 10 mL IV push       Date Action Dose Route User    5/16/2024 1300 Given 40 mg Intravenous Kayla Rodriguez RN          potassium chloride 20 mEq/100mL IVPB premix 20 mEq       Date Action Dose Route User    5/15/2024 2009 New Bag 20 mEq Intravenous Joann Weinberg RN          potassium chloride 40 mEq in 250mL sodium chloride 0.9% IVPB premix       Date Action Dose Route User    5/15/2024 1552 New Bag 40 mEq Intravenous Crispin Vyas RN          potassium chloride (Klor-Con M20) tab 40 mEq       Date Action Dose Route User    5/16/2024 0544 Given 40 mEq Oral Joann Weinberg RN    5/16/2024 0320 Given 40 mEq Oral Joann Weinberg RN          potassium chloride (Klor-Con M20) tab 40 mEq       Date Action Dose Route User    5/16/2024 1140 Given 40 mEq Oral Kayla Rodriguez RN          sodium bicarbonate 150 mEq in dextrose 5% 1,000 mL infusion       Date Action Dose Route User    5/15/2024 1730 New Bag 150 mEq Intravenous Crispin Vyas RN          sodium chloride 0.9% infusion        Date Action Dose Route User    5/16/2024 1141 New Bag (none) Intravenous Kayla Rodriguez, KELECHI    5/16/2024 0631 New Bag (none) Intravenous Joann Weinberg RN    5/15/2024 2317 New Bag (none) Intravenous Joann Weinberg, KELECHI          vancomycin (Vancocin) 1.25 g in sodium chloride 0.9% 250mL IVPB premix       Date Action Dose Route User    5/16/2024 1303 New Bag 1,250 mg Intravenous Kayla Rodriguez RN    5/16/2024 0147 New Bag 1,250 mg Intravenous Joann Weinberg RN            Vitals (last day)       Date/Time Temp Pulse Resp BP SpO2 Weight O2 Device O2 Flow Rate (L/min) [facility]    05/16/24 1445 -- 89 19 134/62 96 % -- -- --     05/16/24 1430 -- 88 11 -- 97 % -- -- --     05/16/24 1415 -- 85 23 -- 98 % -- -- --     05/16/24 1400 -- 85 13 117/58 95 % -- -- --     05/16/24 1315 -- 87 12 114/59 94 % -- -- --     05/16/24 1230 -- 84 16 109/62 97 % -- -- --     05/16/24 1145 99.8 °F (37.7 °C) 87 31 114/59 96 % -- None (Room air) --     05/16/24 1100 -- 89 11 105/55 92 % -- -- --     05/16/24 1000 -- 92 24 -- 97 % -- -- --     05/16/24 0930 -- 97 25 118/51 94 % -- -- --     05/16/24 0900 -- 96 28 118/62 98 % -- -- --     05/16/24 0830 -- -- -- 127/56 98 % -- -- --     05/16/24 0800 99.2 °F (37.3 °C) 90 30 119/55 98 % -- None (Room air) --     05/16/24 0630 -- 82 26 119/57 96 % -- -- -- AB    05/16/24 0615 -- 80 22 118/57 98 % -- -- -- AB    05/16/24 0600 -- 80 28 111/55 98 % -- -- -- AB    05/16/24 0545 100.3 °F (37.9 °C) 81 24 106/60 97 % -- -- -- AB    05/16/24 0530 -- 79 19 103/52 98 % -- -- -- AB    05/16/24 0515 -- 82 25 100/57 96 % -- -- -- AB    05/16/24 0500 -- 81 8 100/54 98 % -- -- -- AB    05/16/24 0445 -- 80 22 96/57 94 % -- -- -- AB    05/16/24 0430 -- 83 15 101/59 98 % -- -- -- AB    05/16/24 0415 -- 79 21 107/58 97 % -- -- -- AB    05/16/24 0245 -- 86 25 105/57 98 % -- -- -- AB    05/16/24 0230 -- 87 15 110/54 98 % -- -- -- AB    05/16/24 0215 -- 86 20 107/62 98 % -- -- -- AB     05/16/24 0200 -- 87 14 112/57 98 % -- -- -- AB    05/16/24 0150 101.4 °F (38.6 °C) 87 24 107/55 96 % -- -- -- AB    05/16/24 0145 -- 85 26 107/55 94 % -- -- -- AB    05/16/24 0130 -- 85 23 109/58 97 % -- -- -- AB    05/16/24 0115 -- 84 15 108/58 99 % -- -- -- AB    05/16/24 0100 -- 84 13 104/57 98 % -- -- -- AB    05/16/24 0045 -- 84 21 104/56 95 % -- -- -- AB    05/15/24 2300 -- 86 25 102/51 94 % -- -- -- AB    05/15/24 2205 -- 91 17 94/50 97 % -- -- -- AB    05/15/24 2100 100.2 °F (37.9 °C) 99 22 94/49 95 % -- None (Room air) -- AB    05/15/24 2000 100.4 °F (38 °C) 103 25 102/55 95 % -- None (Room air) -- AB    05/15/24 1900 -- 98 16 113/65 96 % -- -- -- SK    05/15/24 1845 -- 101 33 103/51 -- -- -- -- SK    05/15/24 1830 -- 99 25 123/54 -- -- -- -- SK    05/15/24 1815 -- 99 18 129/56 -- -- -- -- SK    05/15/24 1800 -- 102 25 121/55 -- -- -- -- SK    05/15/24 1745 -- 96 28 115/57 99 % -- -- -- SK    05/15/24 1730 98.6 °F (37 °C) 95 31 104/51 99 % -- -- -- SK    05/15/24 1715 -- 93 24 107/50 99 % -- -- -- SK    05/15/24 1700 -- 93 30 111/53 96 % -- -- -- SK    05/15/24 1645 -- 91 23 113/53 99 % -- -- -- SK    05/15/24 1630 -- 91 20 110/54 99 % -- -- -- SK    05/15/24 1615 -- 88 19 89/49 98 % -- -- -- SK    05/15/24 1600 101.2 °F (38.4 °C) 85 29 91/46 99 % -- None (Room air) -- SK    05/15/24 1545 -- 88 30 105/54 98 % -- -- -- SK    05/15/24 1530 -- 84 31 95/50 97 % -- -- -- SK    05/15/24 1515 -- 87 27 100/49 97 % -- -- -- SK    05/15/24 1500 -- 85 26 94/55 93 % -- -- -- SK    05/15/24 1445 -- 91 30 99/48 97 % -- -- -- SK    05/15/24 1430 -- 90 26 93/44 96 % -- -- -- SK    05/15/24 1415 -- 90 21 101/50 98 % -- -- -- SK    05/15/24 1400 -- 91 20 100/52 98 % -- -- -- SK    05/15/24 1345 -- 89 26 100/49 98 % -- -- -- SK    05/15/24 1330 -- 89 20 102/48 99 % -- -- -- SK    05/15/24 1315 -- 93 24 110/53 99 % -- -- -- SK    05/15/24 1300 102 °F (38.9 °C) 90 25 116/55 98 % -- None (Room air) -- SK    05/15/24 1245 --  88 31 104/54 99 % -- -- -- SK    05/15/24 1230 -- 87 26 104/53 100 % -- -- -- SK    05/15/24 1215 -- 87 21 102/52 100 % -- -- -- SK    05/15/24 1200 -- 88 29 98/53 100 % -- -- -- SK    05/15/24 1145 -- 89 29 102/53 100 % -- -- -- SK    05/15/24 1130 -- 88 32 98/50 99 % -- -- -- SK    05/15/24 1115 -- 90 26 100/55 99 % -- -- -- SK    05/15/24 1100 -- 88 14 95/50 100 % -- -- -- SK    05/15/24 1045 -- 88 21 101/50 100 % -- -- -- SK    05/15/24 1030 -- 87 18 95/50 98 % -- -- -- SK    05/15/24 1015 -- 92 25 99/80 97 % -- -- -- SK    05/15/24 1000 -- 98 27 88/44 96 % -- -- -- SK    05/15/24 0945 -- 89 34 95/42 96 % -- -- -- SK    05/15/24 0930 -- 88 35 91/50 98 % -- -- -- SK    05/15/24 0915 -- 91 26 98/54 96 % -- -- -- SK    05/15/24 0900 -- 93 30 87/45 96 % -- -- -- SK    05/15/24 0845 -- 93 23 -- 98 % -- -- -- SK    05/15/24 0830 -- 97 29 90/46 96 % -- -- -- SK    05/15/24 0815 -- 94 33 -- 97 % -- -- -- SK    05/15/24 0800 100.5 °F (38.1 °C) 93 24 93/48 97 % -- None (Room air) -- SK    05/15/24 0745 -- 101 33 109/48 97 % -- -- -- SK    05/15/24 0700 -- 101 29 96/50 96 % -- -- -- NH    05/15/24 0600 100.7 °F (38.2 °C) 110 33 117/59 99 % -- -- -- NH    05/15/24 0500 -- 108 30 132/62 98 % 166 lb 3.6 oz -- -- NH    05/15/24 0400 99.6 °F (37.6 °C) 101 29 125/58 99 % -- None (Room air) -- NH    05/15/24 0300 -- 92 31 110/51 99 % -- -- -- NH    05/15/24 0230 -- -- -- 109/50 -- -- -- -- NH    05/15/24 0200 -- 93 29 97/51 99 % -- -- -- NH    05/15/24 0100 -- 97 31 95/47 98 % -- -- -- NH    05/15/24 0000 101.3 °F (38.5 °C) 106 40 98/49 97 % -- None (Room air) -- NH       5/14 UROLOGY CONSULT NOTE    Reason for Consultation:  Hydronephrosis/hydroureter  Recent cystectomy/neobladder     History of Present Illness:  Yunior Garrett is a a(n) 59 year old male with PMH of T2DM, HTN, HLD, CAD s/p PCI x3 (2019), idiopathic cardiomyopathy, sarcoidosis of lung, melanoma of shoulder, anemia of chronic illness, chronic low back pain  s/p spinal neurostimulator, left-sided hemidiaphragmatic paralysis      Patient is followed by Dr. Ramiro Yost at [facility].  History of BCG unresponsive T1/Tcis bladder cancer status post radical cystectomy orthotopic neobladder on 3/14/24. Underwent radical cystectomy with neobladder creation 3/14/24.    Findings: The patient has a history of BCG unresponsive HG NMIBC, at surgery there were no obvious abnormal lymph nodes or extravesical disease.      The final pathology revealed pT0N0    Impression:      Patient Active Problem List   Diagnosis    Mixed hyperlipidemia    Type 2 diabetes mellitus without complication, without long-term current use of insulin (HCC)    CAD in native artery    Idiopathic cardiomyopathy (HCC)    Impotence of organic origin    Benign essential hypertension    Agatston coronary artery calcium score greater than 400    Sarcoidosis of lung (HCC)    Malignant melanoma of upper extremity, including shoulder, unspecified laterality (HCC)    Anemia of chronic illness    Dysphagia    Anemia    Azotemia    Leukocytosis    Hyperglycemia    Metabolic acidosis    Sepsis, due to unspecified organism, unspecified whether acute organ dysfunction present (HCC)      Shock     UTI     Bilateral hydronephrosis/hydroureter     ELENA     Tmax so far today 101.7  Lactic acid 1.2  WBC 23.3  Hgb 10.5  Platelet count 244  Glucose 280  Sodium 137  Potassium 4.2  Chloride 118  Carbon dioxide 10  BUN 57  Serum creat 1.84  UA 5/14/24: >50 WBC/hpf, 6-10 RBC/hpf, rare bacteria  Urine culture 5/14/24: pending  2./2 blood cultures 5/14/24: pending  SARS-CoV-2/Flu A and B/RSV by PCR 5/14/24: negative  CTA chest, CT abdomen/pelvis 5/14/24: Pulmonary artery evaluation limited by bolus opacification, no large or central pulmonary artery embolism identified.  Multifocal inflammatory appearing changes, including multifocal areas of inflammatory appearing nodularity along, and reticular nodular interstitial changes.  Signs of  airways inflammation are also present.  Concern for atypical infection.  No pleural effusion or pneumothorax.Reactive appearing lymph nodes in the chest.  Bilateral hydronephrosis and hydroureter.  Moderately dilated irregular shaped urinary bladder with areas of wall thickening.  No bladder stone or bladder gas collections. Urology referral for these findings advised. Renal cysts are present, but in addition to defined measurable cysts, there are smaller poorly defined areas of low attenuation not typical for cyst, and could reflect areas of pyelonephritis.     Cardona catheter placed - 1200 mL drained.       History of BCG unresponsive T1/Tcis bladder cancer   Status post radical cystectomy orthotopic neobladder on 3/14/24.      Recommendations:  Check final cultures  Abx per attending  Follow labs, temp, UOP  CPM cardona catheter  Follow-up renal US in 2-3 days  Further management per ICU team  Patient to follow-up with his established urologist (Dr. Yost) after discharge.      5/14 PULMONARY CONSULT NOTE    Date of Admission: 5/14/2024  4:43 AM  Admission Diagnosis: Metabolic acidosis [E87.20]  Sepsis, due to unspecified organism, unspecified whether acute organ dysfunction present (HCC) [A41.9]        Assessment/Plan:  Shock - suspect septic from UTI and possible multifocal pneumonia. Started on IV pressors after not responding to fluid bolus  - Wean pressors as tolerated for MAP >65  - Trend lactate  - Check echo to r/o septic emboli/endocarditis given appearance  - Cont IV fluids  - Antibiotics as below; add azithromycin for atypical pna coverage  UTI - moderate bilateral hydronephrosis on CT imaging, unclear if this is related to neobladder creation vs intrarenal obstruction or from sepsis  - Cefepime + azithromycin   - Urine culture pending  ELENA - appears acute, likely from sepsis and possibly obstruction. UA shows +LE and >WBC  - Cefepime for now pending culture  - Will narrow coverage as  able  Hydronephrosis  - Renal US 2-3 days  - Rodriguez catheter for decompression  - UTI treatment with abx as above  Bladder CA - recent orthotopic neobladder creation at [facility] in March 2024 for BCG unresponsive bladder CA  - Per urology  Pulmonary sarcoidosis - unclear history, there are multiple nodules on CT chest. May be sarcoid related vs infection  - Abx as above  - Will montior  - Hold on steroids  Cardiomyopathy, nonischemic  - Echo pending  - Monitor for fluid overload  Acidosis - nongapped, possibly from urinary losses vs RTA?  - Bicarb gtt  - Repeat labs pending  FEN/GI  - Diet as tolerated  Proph  - Heparin, SCD  Dispo  - Critically ill owing to shock state  - ICU level of care; full code  - We will follow along with you, call with questions     Discussed at length with patient / family.  Discussed with nurse          History of Present Illness:   Yunior Garrett is a 59 year old with history of nonischemic cardiomyopathy, pulmonary sarcoidosis, chronic low back pain s/p spinal stimulator and bladder CA s/p neobladder creation.  Presenting for fevers, chils and hypotension for past several days.  Found to be in shock requiring IV pressors.  There is possible pneumonia on CT chest as well as bilateral hydronephrosis with perinephric stranding and cystitis in neobladder consistent with UTI.  Patient transferred to ICU for care when blood pressure was unresponsive to fluids.     H&P    CC:       Chief Complaint   Patient presents with    Difficulty Breathing    Fall    Fatigue        History of Present Illness: Patient is a 59 year old male with PMH sig for bladder cancer status post neobladder creation in March, nonischemic cardiomyopathy chronic low back pain status post spinal stimulator, pulmonary sarcoidosis who presents for evaluation of 1 week of generalized weakness, some shortness of breath, cough.  Patient was in usual state of health after his surgery until about a week ago when he started  feeling tired, he had a fall earlier in the day.  Per wife, no fevers noted at home until the ambulance arrived.  No vomiting or diarrhea.  He has been urinating well since the creation of his neobladder.     In the ER, patient's vitals were significant for blood pressure 84/50, fever 101.7, heart rate 115 and respirations 34.  Patient had a CT chest abdomen pelvis which was abnormal.  He was admitted for further workup and management.          ASSESSMENT / PLAN:      Patient is a 59 year old male with PMH sig for bladder cancer status post neobladder creation in March, nonischemic cardiomyopathy chronic low back pain status post spinal stimulator, pulmonary sarcoidosis who presents for evaluation of 1 week of generalized weakness, some shortness of breath, cough.     Septic shock  Secondary to UTI and possible multifocal pneumonia  - IV pressors, wean as tolerated  - IVF  - Lactate within normal range  - 2D echo pending  - Empiric cefepime and azithromycin  - Await blood cultures, urine cultures  - ICU management     UTI  - Abnormal UA, await cultures  - CT evidence of hydronephrosis, bilateral  - In the setting of recent neobladder creation  - Continue IV cefepime and azithromycin  - Urology on consult, appreciate input  - Maintain Rodriguez     ELENA  - In the setting of UTI and sepsis  - Follow BMP     History of bladder cancer  - Status post recent neobladder creation at [facility] in March 2024  - Follows with [facility] urologist     History of pulmonary sarcoidosis  - Multiple nodules on CT chest, unclear if infection versus sarcoid related  - Plan to treat with IV antibiotics for now, may need outpatient monitoring     Nonischemic cardiomyopathy  - Echo pending  - Hold aspirin, statin for now     Hypertension  - Hold antihypertensives, on pressors     Hyperlipidemia  - Hold statin for now     Acidosis, nongap  - Presenting bicarb 10, in the setting of sepsis  - Bicarb drip per ICU     Type 2 diabetes with hyperglycemia  -  Hold home insulin  - Accu-Cheks, ISS, may need long-acting coverage when sepsis improves     FN:  - IVF: Bicarb drip  - Diet: Diabetic     DVT Prophy: SCDs, heparin subcu  Atrophy: Ambulate as tolerated  Lines: PIV     Dispo: ICU  Outpatient records or previous hospital records reviewed.      Further recommendations pending patient's clinical course.  [facility] hospitalist to continue to follow patient while in house     5/15 UROLOGY NOTE    Subjective:  Yunior Garrett is a(n) 59 year old male.     Current complaints: Off pressors.  +fevers.  Chills yesterday.  No abdominal pain.       Just had cardiac echo     Wife at bedside.     Objective:  General appearance: no distress  BP 93/48   Pulse 93   Temp (!) 100.5 °F (38.1 °C) (Temporal)   Resp 24   Ht 5' 5\" (1.651 m)   Wt 166 lb 3.6 oz (75.4 kg)   SpO2 97%   BMI 27.66 kg/m²   Lungs: non-labored respirations  Abdomen: soft, non-tender  Rodriguez catheter in place draining yellow urine with some sediment (UOP - 7650 mL/24 hours)        Lab Results   Component Value Date     WBC 10.3 05/15/2024     HGB 8.5 05/15/2024     HCT 25.8 05/15/2024     .0 05/15/2024     CREATSERUM 1.26 05/15/2024     BUN 30 05/15/2024      05/15/2024     K 3.0 05/15/2024     K 3.0 05/15/2024      05/15/2024     CO2 18.0 05/15/2024      05/15/2024     CA 8.3 05/15/2024     ALB 1.9 05/15/2024     ALKPHO 83 05/15/2024     BILT 0.3 05/15/2024     TP 6.7 05/15/2024     AST 19 05/15/2024     ALT 34 05/15/2024     PGLU 259 05/15/2024               Hospital Encounter on 05/14/24   1. Blood Culture     Status: Abnormal (Preliminary result)     Collection Time: 05/14/24  5:32 AM     Specimen: Blood,peripheral   Result Value Ref Range     Blood Culture Result Positive N/A     Blood Smear Positive Blood Culture (A) N/A     Blood Smear Gram positive cocci in clusters (A) N/A        Assessment:    Septic shock  UTI  Bacteremia  Bilateral hydronephrosis/hydroureter  ELENA     Tmax  101.8 on 5/14, Tmax 101.3 so far today  Lactic acid 1.2 > 1.1  WBC 23.3 > 19.8 > 10.3  Hgb 10.5 > 9.3 > 8.5  Platelet count 244 > 214 > 170  Serum creat 1.84 > 1.43 > 1.45 > 1.26  UA 5/14/24: >50 WBC/hpf, 6-10 RBC/hpf, rare bacteria  Urine culture 5/14/24: pending  2./2 blood cultures 5/14/24: prelim gram positive cocci in clusters  Blood culture ID PCR 5/14/24: prelim Staphylococcus aureus, MRS by PCR  SARS-CoV-2/Flu A and B/RSV by PCR 5/14/24: negative  CTA chest, CT abdomen/pelvis 5/14/24: Pulmonary artery evaluation limited by bolus opacification, no large or central pulmonary artery embolism identified.  Multifocal inflammatory appearing changes, including multifocal areas of inflammatory appearing nodularity along, and reticular nodular interstitial changes.  Signs of airways inflammation are also present.  Concern for atypical infection.  No pleural effusion or pneumothorax.Reactive appearing lymph nodes in the chest.  Bilateral hydronephrosis and hydroureter.  Moderately dilated irregular shaped urinary bladder with areas of wall thickening.  No bladder stone or bladder gas collections. Urology referral for these findings advised. Renal cysts are present, but in addition to defined measurable cysts, there are smaller poorly defined areas of low attenuation not typical for cyst, and could reflect areas of pyelonephritis.     Cardona catheter placed - 1200 mL drained.       History of BCG unresponsive T1/Tcis bladder cancer   Status post radical cystectomy orthotopic neobladder on 3/14/24.      Plan:    -Check final cultures   -Abx per ID - ID consulted  -Follow labs, temp, UOP  -CPM with cardona catheter  -Renal US in 2 days  -Further management per ICU team  -Patient to follow-up with his established urologist (Dr. Yost) after discharge.       5/15 PULMONARY NOTE    S: overall doing quite a bit better. Denies pain.  Has mild SOB. No cough/phlegm.  Febrile earlier today to 101.3      Assessment/Plan:  Septic  shock from UTI and multifocal pneumonia. +MRSA bacteremia  - Wean pressors as tolerated for MAP >65  - Trend lactate  - echo to r/o septic emboli/endocarditis pending  - Cont IV fluids  - ID consulted  UTI - moderate bilateral hydronephrosis on CT imaging, unclear if this is related to neobladder creation vs intrarenal obstruction or from sepsis  - Cefepime + IV vanco   - Urine culture pending  ELENA - appears acute, likely from sepsis and possibly obstruction.   - cont with gentle hydration with bicarb gtt  - monitor UO/lytes, bicarb  Hydronephrosis  - Renal US 2-3 days  - Rodriguez catheter for decompression  - UTI treatment with abx as above  Bladder CA - recent orthotopic neobladder creation at [facility] in March 2024 for BCG unresponsive bladder CA  - Per urology  Pulmonary sarcoidosis - unclear history, there are multiple nodules on CT chest. May be sarcoid related vs infection  - Abx as above  - Will montior  - Hold on steroids  - OP monitoring  Cardiomyopathy, nonischemic  - Echo pending  - Monitor for fluid overload  NAGMA, possibly from urinary losses vs RTA  - responding favorably to bicarb gtt  - Repeat labs serially  F/E/N- ADAT, lytes prn, gentle hydration  Proph - Heparin, SCD  Dispo- Full code  - Critically ill     5/15 I D CONSULT NOTE  ADMISSION DATE:       05/14/2024      CONSULT DATE:  05/15/2024     REPORT OF CONSULTATION     HISTORY OF PRESENT ILLNESS:  This is a 59-year-old man who has a history of bladder cancer.  This did not respond well to BCG treatment.  He went for a second opinion, I believe, at [facility], and he elected to undergo bladder resection with neobladder formation.  This was done on March 14.  He also has a history of sarcoidosis with multiple pulmonary nodules, followed at the [facility].  They said his last CT scan was unchanged and has been like that for a while.  He presents with septic shock.  Blood cultures have grown MRSA.  He was weaned off pressors, but again  this morning is back on pressors.  I am asked to evaluate.  No other GI, cardiovascular, CNS, or respiratory symptoms are currently noted, and he remains not short of breath at rest.  Chronically, he is short of breath with exertion, which is blamed on his sarcoid.  He is off oxygen.  A CT scan done revealed hydronephrosis bilateral, and a Rodriguez has been inserted into his neobladder.  In addition, he has chronic low back pain.  This pain is not worse, and he has a spinal stimulator which helps.      IMPRESSION:    1.       Septic shock with methicillin-resistant Staphylococcus aureus bacteremia.  Treatment will continue with vancomycin.  The CT had bilateral hydronephrosis, and he now has a Rodriguez catheter in his neobladder.  This is the most likely source of the events by my view.  2.       The urine, however, has active sediment.  Most likely this is from the above and I would anticipate MRSA growing from his urine, but we will leave him on cefepime until the urine culture becomes available.  3.       There are multiple pulmonary nodules, but he has a history of sarcoid.  He is not on oxygen, and I would suggest he either get us the CT results from the [facility] or get our CT of his chest to the [facility] so they can compare if there are any differences.  4.       There is no evidence of endocarditis or seeded spine, but with MRSA bacteremia, both of these are in the differential.  An echocardiogram result is pending.       I have spoken with the patient, wife, and nurse.  Further suggestions to follow.      5/16 ID NOTE  Subjective:     Pt seen and examined resting in bed, family at bedside. He is tolerating the IV abx, feeling better since admit. Does report suprapubic pain. RN at bedside patient has been of pressors since yesterday afternoon. Still with fevers         Objective:  Blood pressure 119/57, pulse 82, temperature 100.3 °F (37.9 °C), resp. rate 26, height 5' 5\" (1.651 m), weight 166 lb 3.6 oz (75.4  kg), SpO2 96%.       Physical Exam:  General: Awake, alert, non-tox, NAD.  HEENT:  Oropharynx clear, trachea ML.  Heart: RRR S1S2 no murmurs.  Lungs: Essentially CTA b/l, no rhonchi, rales, wheezes.  Abdomen: Soft, ND, +suprapubic ttp, right suprapubic area with ?ingrown hair surrounding erythema   Extremity: No edema.  Neurological: No focal deficits.  Derm:  Warm, dry, free from rashes.     Lab Data Review:        Lab Results   Component Value Date     WBC 6.4 05/16/2024     HGB 7.5 05/16/2024     HCT 22.5 05/16/2024     .0 05/16/2024     CREATSERUM 0.94 05/16/2024     BUN 18 05/16/2024      05/16/2024     K 3.2 05/16/2024      05/16/2024     CO2 32.0 05/16/2024      05/16/2024     CA 7.6 05/16/2024     MG 1.9 05/15/2024       Assessment and Plan:     MRSA sepsis  - ?  source  - 2/2 blood cultures on 5/14 with MRSA  - Repeat blood cultures from 5/15/24 pending  - UC with multiple species present-probable contamination   - TTE without evidence of endocarditis  - IV Vancomycin ongoing      Hx Bladder CA s/p neobladder creation 3/14  - CT with moderate bilateral hydronephrosis   - UA abnormal, UC with multiple species present-probable contamination   - urology following  - IV Vancomycin and cefepime ongoing      Hx pulmonary sarcoidosis  - CT with multiple pulmonary nodules, concern for atypical infection  - s/p IV Azithromycin  - IV abx as above     Fever  - 2/2 above  - monitor fever curve      Chronic back pain with spinal stimulator in place        Recommendations  - Continue IV Vancomycin, pharmacy to dose and cefepime as Rx, will repeat UA/UC and likely dc cefepime tomorrow  - Follow pending cultures  - Repeat labs tomorrow  - Monitor fever curve   - Further recommendations to follow      Plan of care discussed with Dr. Chow, he is in agreement with the plan of care.     5/16 UROLOGY NOTE    Current complaints: Tmax 101.8 on 5/14, Tmax 102 on 5/15, Tmax 101.4 so far today, most  recent temp 99.2.  Off pressors      Catheter clogged due to sediment - hand irrigated - 800 mL of urine drained from bladder.      +abdominal pain now.       Wife at bedside.       Objective:  General appearance: alert, appears stated age, and cooperative  Blood pressure 118/62, pulse 96, temperature 99.2 °F (37.3 °C), temperature source Temporal, resp. rate (!) 28, height 5' 5\" (1.651 m), weight 166 lb 3.6 oz (75.4 kg), SpO2 98%.  Lungs: non-labored respirations  Abdomen: +tenderness  : cardona catheter in place.  Small amount of urine in bag, no urine in tubing        Lab Results   Component Value Date     WBC 6.4 05/16/2024     HGB 7.5 05/16/2024     HCT 22.5 05/16/2024     .0 05/16/2024     CREATSERUM 0.94 05/16/2024     BUN 18 05/16/2024      05/16/2024     K 3.2 05/16/2024      05/16/2024     CO2 32.0 05/16/2024      05/16/2024     CA 7.6 05/16/2024     MG 1.9 05/15/2024     PGLU 123 05/16/2024        Assessment:    Septic shock  UTI  Bacteremia  Bilateral hydronephrosis/hydroureter  ELENA     Tmax 101.8 on 5/14, Tmax 102 on 5/15, Tmax 101.4 so far today, most recent temp 99.2  Lactic acid 1.2 > 1.1  WBC 23.3 > 19.8 > 10.3 > 6.4  Hgb 10.5 > 9.3 > 8.5 > 7.5  Platelet count 244 > 214 > 170 > 122  Serum creat 1.84 > 1.43 > 1.45 > 1.26 > 1.34 > 1.263 > 0.94  UA 5/14/24: >50 WBC/hpf, 6-10 RBC/hpf, rare bacteria  Urine culture 5/14/24: <10K CFU/mL multiple species present - probable contamination  2./2 blood cultures 5/14/24: Staph aureus, MRSA  Blood culture ID PCR 5/14/24: Staphylococcus aureus, MRS by PCR  2/2 blood cultures 5/15/24: pending  SARS-CoV-2/Flu A and B/RSV by PCR 5/14/24: negative  CTA chest, CT abdomen/pelvis 5/14/24: Pulmonary artery evaluation limited by bolus opacification, no large or central pulmonary artery embolism identified.  Multifocal inflammatory appearing changes, including multifocal areas of inflammatory appearing nodularity along, and reticular nodular  interstitial changes.  Signs of airways inflammation are also present.  Concern for atypical infection.  No pleural effusion or pneumothorax.Reactive appearing lymph nodes in the chest.  Bilateral hydronephrosis and hydroureter.  Moderately dilated irregular shaped urinary bladder with areas of wall thickening.  No bladder stone or bladder gas collections. Urology referral for these findings advised. Renal cysts are present, but in addition to defined measurable cysts, there are smaller poorly defined areas of low attenuation not typical for cyst, and could reflect areas of pyelonephritis.     Cardona catheter placed - 1200 mL drained.       History of BCG unresponsive T1/Tcis bladder cancer   Status post radical cystectomy orthotopic neobladder on 3/14/24.      Plan:    -Check final cultures  -Abx per ID  -Follow labs, temp, UOP  -CPM with cardona catheter. Nurse to hand irrigate cardona catheter now and TID/prn  -Renal US tomorrow  -Further management per ICU team  -Patient to follow-up with his established urologist (Dr. Yost) after discharge.       Above discussed with patient, wife, nurse.    5/16 PULMONARY NOTE  Admission Diagnosis: Metabolic acidosis [E87.20]  Sepsis, due to unspecified organism, unspecified whether acute organ dysfunction present (HCC) [A41.9]     S: no new events.      OBJECTIVE:  /51   Pulse 92   Temp 99.2 °F (37.3 °C) (Temporal)   Resp 24   Ht 165.1 cm (5' 5\")   Wt 166 lb 3.6 oz (75.4 kg)   SpO2 97%   BMI 27.66 kg/m²      On room air      Assessment/Plan:  Septic shock from UTI and multifocal pneumonia. +MRSA bacteremia  - Weaned off pressors   - lactate normalized  - echo to r/o septic emboli/endocarditis was unremarkable  - Cont IV fluids  - ID following- abx per them  UTI - moderate bilateral hydronephrosis on CT imaging, unclear if this is related to neobladder creation vs intrarenal obstruction or from sepsis  - Cefepime + IV vanco   - Urine culture pending  ELENA -  from  sepsis - resolved. Creat back to normal  - stop bicarb gtt  - monitor UO/lytes  Hydronephrosis  - Renal US 2-3 days  - Rodriguez catheter for decompression  -  per urology  Bladder CA - recent orthotopic neobladder creation at [facility] in March 2024 for BCG unresponsive bladder CA  - Per urology  Skin carbuncle near surgical incision site- painful to touch.  Will defer mgt to urology/ID.  May need to be lanced if doesn't respond to abx.  Pulmonary sarcoidosis - unclear history, there are multiple nodules on CT chest. May be sarcoid related vs infection  - Abx as above  - Will montior  - Hold on steroids  - OP monitoring  Cardiomyopathy, nonischemic  - Echo unremarkable  NAGMA, possibly from urinary losses vs RTA  - resolved.  Off bicarb gtt  Heme- thrombocytopenia- likely hemodilution  - stop hep for now, check HIT  F/E/N- ADAT, lytes prn, gentle hydration  Proph - Heparin, SCD  Dispo- Full code  - transfer to floor.  Will follow in ICU only

## 2024-05-16 NOTE — PROGRESS NOTES
[facility]  Urology Progress Note    Yunior Garrett Patient Status:  Inpatient    1965 MRN [id number]   Location [facility] 4SW-A Attending Adrienne Chahal DO   Hosp Day # 2 PCP Antione Valadez DO     Subjective:  Yunior Garrett is a(n) 59 year old male.    Current complaints: Tmax 101.8 on , Tmax 102 on 5/15, Tmax 101.4 so far today, most recent temp 99.2.  Off pressors     Catheter clogged due to sediment - hand irrigated - 800 mL of urine drained from bladder.     +abdominal pain now.      Wife at bedside.      Objective:  General appearance: alert, appears stated age, and cooperative  Blood pressure 118/62, pulse 96, temperature 99.2 °F (37.3 °C), temperature source Temporal, resp. rate (!) 28, height 5' 5\" (1.651 m), weight 166 lb 3.6 oz (75.4 kg), SpO2 98%.  Lungs: non-labored respirations  Abdomen: +tenderness  : cardona catheter in place.  Small amount of urine in bag, no urine in tubing  Lab Results   Component Value Date    WBC 6.4 2024    HGB 7.5 2024    HCT 22.5 2024    .0 2024    CREATSERUM 0.94 2024    BUN 18 2024     2024    K 3.2 2024     2024    CO2 32.0 2024     2024    CA 7.6 2024    MG 1.9 05/15/2024    PGLU 123 2024       Hospital Encounter on 24   1. Urine Culture, Routine     Status: None    Collection Time: 24  9:13 AM    Specimen: Urine, clean catch   Result Value Ref Range    Urine Culture  N/A     <10,000 cfu/ml Multiple species present- probable contamination.   2. Blood Culture     Status: Abnormal    Collection Time: 24  5:32 AM    Specimen: Blood,peripheral   Result Value Ref Range    Blood Culture Result Positive N/A    Blood Culture Result Staphylococcus aureus (A) N/A    Blood Smear Positive Blood Culture (A) N/A    Blood Smear Gram positive cocci in clusters (A) N/A        XR CHEST AP PORTABLE  (CPT=71045)    Result Date:  5/14/2024  CONCLUSION:    Stable moderate elevation right diaphragm.  Central venous catheter placed via the right jugular approach tip in the SVC no pneumothorax.  Stable heart size.  No CHF or pleural effusion. LOCATION:  [facility]      Dictated by (CST): Tarun Castellanos MD on 5/14/2024 at 10:09 AM     Finalized by (CST): Tarun Castellanos MD on 5/14/2024 at 10:10 AM         Assessment:    Septic shock  UTI  Bacteremia  Bilateral hydronephrosis/hydroureter  ELENA     Tmax 101.8 on 5/14, Tmax 102 on 5/15, Tmax 101.4 so far today, most recent temp 99.2  Lactic acid 1.2 > 1.1  WBC 23.3 > 19.8 > 10.3 > 6.4  Hgb 10.5 > 9.3 > 8.5 > 7.5  Platelet count 244 > 214 > 170 > 122  Serum creat 1.84 > 1.43 > 1.45 > 1.26 > 1.34 > 1.263 > 0.94  UA 5/14/24: >50 WBC/hpf, 6-10 RBC/hpf, rare bacteria  Urine culture 5/14/24: <10K CFU/mL multiple species present - probable contamination  2./2 blood cultures 5/14/24: Staph aureus, MRSA  Blood culture ID PCR 5/14/24: Staphylococcus aureus, MRS by PCR  2/2 blood cultures 5/15/24: pending  SARS-CoV-2/Flu A and B/RSV by PCR 5/14/24: negative  CTA chest, CT abdomen/pelvis 5/14/24: Pulmonary artery evaluation limited by bolus opacification, no large or central pulmonary artery embolism identified.  Multifocal inflammatory appearing changes, including multifocal areas of inflammatory appearing nodularity along, and reticular nodular interstitial changes.  Signs of airways inflammation are also present.  Concern for atypical infection.  No pleural effusion or pneumothorax.Reactive appearing lymph nodes in the chest.  Bilateral hydronephrosis and hydroureter.  Moderately dilated irregular shaped urinary bladder with areas of wall thickening.  No bladder stone or bladder gas collections. Urology referral for these findings advised. Renal cysts are present, but in addition to defined measurable cysts, there are smaller poorly defined areas of low attenuation not typical for cyst, and could reflect areas  of pyelonephritis.     Cardona catheter placed - 1200 mL drained.       History of BCG unresponsive T1/Tcis bladder cancer   Status post radical cystectomy orthotopic neobladder on 3/14/24.     Plan:    -Check final cultures  -Abx per ID  -Follow labs, temp, UOP  -CPM with cardona catheter. Nurse to hand irrigate cardona catheter now and TID/prn  -Renal US tomorrow  -Further management per ICU team  -Patient to follow-up with his established urologist (Dr. Yost) after discharge.      Above discussed with patient, wife, nurse.    Mar Griffiths PA-C  [facility]  Department of Urology  5/16/2024  9:46 AM    Addendum:  Patient seen and examined with Dr. Poon.    Small carbuncle on right side groin area.    Discussed observation vs I and D.  Patient wishes to proceed with I and D.  Reviewed risks, benefits, and alternatives of the patient who understands and wishes to proceed.      Consent to be signed.    Above discussed with patient, wife, nurse, Dr. Poon.    IRVING Yoder  Urology

## 2024-05-16 NOTE — PROGRESS NOTES
[facility] Hospitalist Progress Note     CC: Hospital Follow up    PCP: Antione Valadez DO       Assessment/Plan:     Principal Problem:    Sepsis, due to unspecified organism, unspecified whether acute organ dysfunction present (HCC)  Active Problems:    Anemia    Azotemia    Leukocytosis    Hyperglycemia    Metabolic acidosis    Patient is a 59 year old male with PMH sig for bladder cancer status post neobladder creation in March, nonischemic cardiomyopathy chronic low back pain status post spinal stimulator, pulmonary sarcoidosis who presents for evaluation of 1 week of generalized weakness, some shortness of breath, cough.  Patient managed for septic shock secondary to MRSA bacteremia.     Septic shock- resolved  Secondary to UTI and possible multifocal pneumonia  MRSA bacteremia  - IV pressors, wean as tolerated  - IVF  - Lactate within normal range  - 2D echo-no evidence of endocarditis  - Empiric cefepime and azithromycin, added vancomycin  - Blood cultures 2 out of 2 positive for MRSA, urine cultures neg, repeat blood cultures- pending  - ID on consult, recs reviewed  - ICU management     UTI  - Abnormal UA, cx neg  - CT evidence of hydronephrosis, bilateral  - In the setting of recent neobladder creation  - Continue IV cefepime and azithromycin  - Urology on consult, appreciate input  - Maintain Rodriguez     Acute on chronic anemia  - No gross bleeding, melena reported  - Given critically ill status, possible PUD versus stress ulcers remain in the differential-other supporting symptoms include nausea, poor appetite, vague abdominal pain  - Repeat H&H in the late afternoon  - Empiric PPI twice daily, can DC if hemoglobin remains stable     Carbuncle, R suprapubic region  - s/p bedside I&D by urology  - cx sent, await results    ELENA- resolved  - In the setting of UTI and sepsis  - Follow BMP     History of bladder cancer  - Status post recent neobladder creation at [facility] in March 2024  - Follows with [facility] urologist      History of pulmonary sarcoidosis  - Multiple nodules on CT chest, unclear if infection versus sarcoid related  - Plan to treat with IV antibiotics for now, may need outpatient monitoring with imaging  - Has chronic shortness of breath, currently bacteremic no indication for steroids at this time     Nonischemic cardiomyopathy  - Echo without any vegetation, normal EF  - Can resume aspirin and statin once out of the ICU     Hypertension  - Hold antihypertensives, on pressors     Hyperlipidemia  - Hold statin for now     Acidosis, nongap  - Presenting bicarb 10, in the setting of sepsis  - Bicarb drip per intensivist     Type 2 diabetes with hyperglycemia  - Hold home insulin  - Accu-Cheks, increased to medium dose ISS, may need long-acting coverage when appetite improves     FN:  - IVF: Bicarb drip  - Diet: Diabetic     DVT Prophy: SCDs, heparin subcu  Atrophy: Ambulate as tolerated  Lines: PIV     Dispo: ICU  Outpatient records or previous hospital records reviewed.      Further recommendations pending patient's clinical course.  [facility] hospitalist to continue to follow patient while in house     Patient and/or patient's family given opportunity to ask questions and note understanding and agreeing with therapeutic plan as outlined     Thank You,  DO Lei Ragland Hospitalist  Answering Service number: [phone]     Subjective:     Seen and examined in the ICU.  He is more awake, alert, interactive.  Complains of pain in his right suprapubic region.  Wife at bedside.  He is worried about his hemoglobin being low.  He has received transfusions for his previous surgeries    OBJECTIVE:    Blood pressure 107/58, pulse 94, temperature (!) 103 °F (39.4 °C), temperature source Temporal, resp. rate (!) 27, height 5' 5\" (1.651 m), weight 166 lb 3.6 oz (75.4 kg), SpO2 95%.    Temp:  [99.2 °F (37.3 °C)-103 °F (39.4 °C)] 103 °F (39.4 °C)  Pulse:  [] 94  Resp:  [8-33] 27  BP: ()/(49-85) 107/58  SpO2:  [92  %-99 %] 95 %      Intake/Output:    Intake/Output Summary (Last 24 hours) at 5/16/2024 1809  Last data filed at 5/16/2024 1729  Gross per 24 hour   Intake 4881 ml   Output 3150 ml   Net 1731 ml       Last 3 Weights   05/15/24 0500 166 lb 3.6 oz (75.4 kg)   05/14/24 1149 160 lb 0.9 oz (72.6 kg)   05/14/24 1110 160 lb 0.9 oz (72.6 kg)   05/14/24 0454 174 lb (78.9 kg)   06/22/22 0645 170 lb (77.1 kg)   06/21/22 0831 169 lb (76.7 kg)   04/05/22 1450 175 lb (79.4 kg)       Exam   Gen: No acute distress, alert and oriented x3, no focal neurologic deficits, appears tired, central line in place  Heent: NC AT, mucous memb clear, neck supple  Pulm: Limited exam, diminished, supplemental oxygen,  CV: Heart with regular rate and rhythm, no peripheral edema  Abd: Abdomen soft, vaguely tender throughout the abdomen, nondistended, bowel sounds present  MSK: Moving all extremities spontaneously, gait not assessed  Skin: Carbuncle noted on the right suprapubic region that is very tender to touch  Neuro: AO*3, motor intact, no sensory deficits  Psyc: appropriate mood and affect      Data Review:       Labs:     Recent Labs   Lab 05/15/24  0353 05/16/24  0524 05/16/24  1618   RBC 3.08* 2.68* 2.91*   HGB 8.5* 7.5* 8.3*   HCT 25.8* 22.5* 24.3*   MCV 83.8 84.0 83.5   MCH 27.6 28.0 28.5   MCHC 32.9 33.3 34.2   RDW 13.7 13.6 13.9   NEPRELIM 9.44* 5.47 6.05   WBC 10.3 6.4 7.1   .0 122.0* 128.0*         Recent Labs   Lab 05/15/24  1057 05/15/24  1434 05/15/24  1855 05/16/24  0142 05/16/24  0524 05/16/24  1026   *  --  239*  --  124*  --    BUN 26*  --  20  --  18  --    CREATSERUM 1.34*  --  1.23  --  0.94  --    EGFRCR 60  --  67  --  92  --    CA 7.7*  --  7.7*  --  7.6*  --      --  139  --  141  --    K 3.0*   < > 3.1* 3.0* 3.2* 3.4*     --  108  --  107  --    CO2 20.0*  --  25.0  --  32.0  --     < > = values in this interval not displayed.       Recent Labs   Lab 05/14/24  0524 05/14/24  1354 05/15/24  0359    ALT 40 38 34   AST 18 20 19   ALB 2.5* 2.2* 1.9*         Imaging:  XR CHEST AP PORTABLE  (CPT=71045)    Result Date: 5/14/2024  CONCLUSION:    Stable moderate elevation right diaphragm.  Central venous catheter placed via the right jugular approach tip in the SVC no pneumothorax.  Stable heart size.  No CHF or pleural effusion. LOCATION:  [facility]      Dictated by (CST): Tarun Castellanos MD on 5/14/2024 at 10:09 AM     Finalized by (CST): Tarun Castellanos MD on 5/14/2024 at 10:10 AM       CT BRAIN OR HEAD (46241)    Result Date: 5/14/2024  CONCLUSION:  No acute process.    LOCATION:  [facility]   Dictated by (CST): Tarun Castellanos MD on 5/14/2024 at 7:46 AM     Finalized by (CST): Tarun Castellanos MD on 5/14/2024 at 7:46 AM       CTA CHEST + CT ABD (W) + CT PEL (W) SH(CPT=71275/90330)    Result Date: 5/14/2024  CONCLUSION:   1. Pulmonary artery evaluation limited by bolus opacification, no large or central pulmonary artery embolism identified.  2. Multifocal inflammatory appearing changes, including multifocal areas of inflammatory appearing nodularity along, and reticular nodular interstitial changes.  Signs of airways inflammation are also present.  Concern for atypical infection.  No pleural  effusion or pneumothorax.  3. Reactive appearing lymph nodes in the chest.  4. Bilateral hydronephrosis and hydroureter.  Moderately dilated irregular shaped urinary bladder with areas of wall thickening.  No bladder stone or bladder gas collections.  Urology referral for these findings advised.  5. Renal cysts are present, but in addition to defined measurable cysts, there are smaller poorly defined areas of low attenuation not typical for cyst, and could reflect areas of pyelonephritis.    LOCATION:  [facility]   Dictated by (CST): Tarun Castellanos MD on 5/14/2024 at 7:37 AM     Finalized by (CST): Tarun Castellanos MD on 5/14/2024 at 7:45 AM       XR CHEST AP PORTABLE  (CPT=71045)    Result Date: 5/14/2024  CONCLUSION:  1. Minimal  right basilar atelectasis. 2. Preliminary report was provided by [facility] at time of examination.  Final report confirms findings on preliminary report without discrepancy.    LOCATION:  [facility]      Dictated by (CST): Quintin Rossi MD on 5/14/2024 at 7:40 AM     Finalized by (CST): Quintin Rossi MD on 5/14/2024 at 7:41 AM          Meds:      pantoprazole  40 mg Intravenous 2 times per day    cefepime  2 g Intravenous Q8H    vancomycin  1,250 mg Intravenous Q12H    insulin aspart  2-10 Units Subcutaneous TID AC and HS    [Held by provider] enoxaparin  40 mg Subcutaneous Daily      norepinephrine Stopped (05/15/24 1315)       HYDROcodone-acetaminophen    acetaminophen    glucose **OR** glucose **OR** glucose-vitamin C **OR** dextrose **OR** glucose **OR** glucose **OR** glucose-vitamin C    ondansetron    prochlorperazine    polyethylene glycol (PEG 3350)    sennosides    bisacodyl    fleet enema    melatonin

## 2024-05-17 LAB
ALBUMIN SERPL-MCNC: 1.8 G/DL (ref 3.4–5)
ALBUMIN/GLOB SERPL: 0.4 {RATIO} (ref 1–2)
ALP LIVER SERPL-CCNC: 93 U/L
ALT SERPL-CCNC: 34 U/L
ANION GAP SERPL CALC-SCNC: 5 MMOL/L (ref 0–18)
AST SERPL-CCNC: 25 U/L (ref 15–37)
BASOPHILS # BLD AUTO: 0.01 X10(3) UL (ref 0–0.2)
BASOPHILS NFR BLD AUTO: 0.1 %
BILIRUB SERPL-MCNC: 0.4 MG/DL (ref 0.1–2)
BUN BLD-MCNC: 18 MG/DL (ref 9–23)
CALCIUM BLD-MCNC: 7.8 MG/DL (ref 8.5–10.1)
CHLORIDE SERPL-SCNC: 107 MMOL/L (ref 98–112)
CO2 SERPL-SCNC: 27 MMOL/L (ref 21–32)
CREAT BLD-MCNC: 1.2 MG/DL
EGFRCR SERPLBLD CKD-EPI 2021: 69 ML/MIN/1.73M2 (ref 60–?)
EOSINOPHIL # BLD AUTO: 0.04 X10(3) UL (ref 0–0.7)
EOSINOPHIL NFR BLD AUTO: 0.5 %
ERYTHROCYTE [DISTWIDTH] IN BLOOD BY AUTOMATED COUNT: 13.7 %
GLOBULIN PLAS-MCNC: 4.6 G/DL (ref 2.8–4.4)
GLUCOSE BLD-MCNC: 141 MG/DL (ref 70–99)
GLUCOSE BLD-MCNC: 142 MG/DL (ref 70–99)
GLUCOSE BLD-MCNC: 157 MG/DL (ref 70–99)
GLUCOSE BLD-MCNC: 159 MG/DL (ref 70–99)
GLUCOSE BLD-MCNC: 161 MG/DL (ref 70–99)
GLUCOSE BLD-MCNC: 190 MG/DL (ref 70–99)
HCT VFR BLD AUTO: 23.6 %
HEPARIN AB PPP QL PL AGG: NEGATIVE
HGB BLD-MCNC: 7.9 G/DL
IMM GRANULOCYTES # BLD AUTO: 0.04 X10(3) UL (ref 0–1)
IMM GRANULOCYTES NFR BLD: 0.5 %
LYMPHOCYTES # BLD AUTO: 0.45 X10(3) UL (ref 1–4)
LYMPHOCYTES NFR BLD AUTO: 5.4 %
MCH RBC QN AUTO: 28.2 PG (ref 26–34)
MCHC RBC AUTO-ENTMCNC: 33.5 G/DL (ref 31–37)
MCV RBC AUTO: 84.3 FL
MONOCYTES # BLD AUTO: 0.73 X10(3) UL (ref 0.1–1)
MONOCYTES NFR BLD AUTO: 8.8 %
NEUTROPHILS # BLD AUTO: 7.04 X10 (3) UL (ref 1.5–7.7)
NEUTROPHILS # BLD AUTO: 7.04 X10(3) UL (ref 1.5–7.7)
NEUTROPHILS NFR BLD AUTO: 84.7 %
OSMOLALITY SERPL CALC.SUM OF ELEC: 293 MOSM/KG (ref 275–295)
PLATELET # BLD AUTO: 128 10(3)UL (ref 150–450)
POTASSIUM SERPL-SCNC: 4.5 MMOL/L (ref 3.5–5.1)
PROT SERPL-MCNC: 6.4 G/DL (ref 6.4–8.2)
RBC # BLD AUTO: 2.8 X10(6)UL
SODIUM SERPL-SCNC: 139 MMOL/L (ref 136–145)
WBC # BLD AUTO: 8.3 X10(3) UL (ref 4–11)

## 2024-05-17 PROCEDURE — 99233 SBSQ HOSP IP/OBS HIGH 50: CPT | Performed by: INTERNAL MEDICINE

## 2024-05-17 RX ORDER — DIPHENHYDRAMINE HYDROCHLORIDE 50 MG/ML
25 INJECTION INTRAMUSCULAR; INTRAVENOUS EVERY 6 HOURS PRN
Status: DISCONTINUED | OUTPATIENT
Start: 2024-05-17 | End: 2024-05-22

## 2024-05-17 RX ORDER — LORAZEPAM 2 MG/ML
0.5 INJECTION INTRAMUSCULAR 2 TIMES DAILY PRN
Status: DISCONTINUED | OUTPATIENT
Start: 2024-05-17 | End: 2024-05-22

## 2024-05-17 NOTE — PROGRESS NOTES
[facility] Hospitalist Progress Note     CC: Hospital Follow up    PCP: Antione Valadez DO       Subjective:     Seen and examined in the ICU.  He is more awake, alert, interactive.  Complains of pain in his right suprapubic region.  Wife at bedside.  He is worried about his hemoglobin being low.  He has received transfusions for his previous surgeries    OBJECTIVE:    Blood pressure 130/68, pulse 95, temperature 98.9 °F (37.2 °C), temperature source Temporal, resp. rate 21, height 5' 5\" (1.651 m), weight 171 lb 11.8 oz (77.9 kg), SpO2 97%.    Temp:  [98.7 °F (37.1 °C)-102.6 °F (39.2 °C)] 98.9 °F (37.2 °C)  Pulse:  [83-97] 95  Resp:  [13-28] 21  BP: (107-138)/(51-75) 130/68  SpO2:  [92 %-98 %] 97 %      Intake/Output:    Intake/Output Summary (Last 24 hours) at 5/17/2024 1651  Last data filed at 5/17/2024 1430  Gross per 24 hour   Intake 2056 ml   Output 2400 ml   Net -344 ml       Last 3 Weights   05/17/24 0400 171 lb 11.8 oz (77.9 kg)   05/15/24 0500 166 lb 3.6 oz (75.4 kg)   05/14/24 1149 160 lb 0.9 oz (72.6 kg)   05/14/24 1110 160 lb 0.9 oz (72.6 kg)   05/14/24 0454 174 lb (78.9 kg)   06/22/22 0645 170 lb (77.1 kg)   06/21/22 0831 169 lb (76.7 kg)   04/05/22 1450 175 lb (79.4 kg)       Exam   Gen: No acute distress, alert and oriented x3, no focal neurologic deficits, appears tired, central line in place  Heent: NC AT, mucous memb clear, neck supple  Pulm: Limited exam, diminished, supplemental oxygen,  CV: Heart with regular rate and rhythm, no peripheral edema  Abd: Abdomen soft, vaguely tender throughout the abdomen, nondistended, bowel sounds present  MSK: Moving all extremities spontaneously, gait not assessed  Skin: Carbuncle noted on the right suprapubic region that is very tender to touch  Neuro: AO*3, motor intact, no sensory deficits  Psyc: appropriate mood and affect      Data Review:       Labs:     Recent Labs   Lab 05/16/24  0524 05/16/24  1618 05/17/24  0453   RBC 2.68* 2.91* 2.80*   HGB 7.5* 8.3* 7.9*    HCT 22.5* 24.3* 23.6*   MCV 84.0 83.5 84.3   MCH 28.0 28.5 28.2   MCHC 33.3 34.2 33.5   RDW 13.6 13.9 13.7   NEPRELIM 5.47 6.05 7.04   WBC 6.4 7.1 8.3   .0* 128.0* 128.0*         Recent Labs   Lab 05/15/24  1855 05/16/24  0142 05/16/24  0524 05/16/24  1026 05/16/24  2118 05/17/24  0453   *  --  124*  --   --  161*   BUN 20  --  18  --   --  18   CREATSERUM 1.23  --  0.94  --   --  1.20   EGFRCR 67  --  92  --   --  69   CA 7.7*  --  7.6*  --   --  7.8*     --  141  --   --  139   K 3.1*   < > 3.2* 3.4* 4.2 4.5     --  107  --   --  107   CO2 25.0  --  32.0  --   --  27.0    < > = values in this interval not displayed.       Recent Labs   Lab 05/14/24  0524 05/14/24  1354 05/15/24  0353 05/17/24  0453   ALT 40 38 34 34   AST 18 20 19 25   ALB 2.5* 2.2* 1.9* 1.8*         Imaging:  No results found.      Meds:      [START ON 5/18/2024] cefepime  1 g Intravenous Q12H    pantoprazole  40 mg Intravenous 2 times per day    vancomycin  1,250 mg Intravenous Q12H    insulin aspart  2-10 Units Subcutaneous TID AC and HS    [Held by provider] enoxaparin  40 mg Subcutaneous Daily           diphenhydrAMINE    LORazepam    HYDROcodone-acetaminophen    acetaminophen    glucose **OR** glucose **OR** glucose-vitamin C **OR** dextrose **OR** glucose **OR** glucose **OR** glucose-vitamin C    ondansetron    prochlorperazine    polyethylene glycol (PEG 3350)    sennosides    bisacodyl    fleet enema    melatonin       Assessment/Plan:     Principal Problem:    Sepsis, due to unspecified organism, unspecified whether acute organ dysfunction present (HCC)  Active Problems:    Anemia    Azotemia    Leukocytosis    Hyperglycemia    Metabolic acidosis    Patient is a 59 year old male with PMH sig for bladder cancer status post neobladder creation in March, nonischemic cardiomyopathy chronic low back pain status post spinal stimulator, pulmonary sarcoidosis who presents for evaluation of 1 week of generalized  weakness, some shortness of breath, cough.  Patient managed for septic shock secondary to MRSA bacteremia.     Septic shock- resolved  Secondary to UTI and possible multifocal pneumonia  MRSA bacteremia  - IV pressors, wean as tolerated  - can dc IVF  - Lactate within normal range  - 2D echo-no evidence of endocarditis  - Empiric cefepime and azithromycin, added vancomycin - per ID   - Blood cultures 2 out of 2 positive for MRSA, urine cultures neg, repeat blood cultures- NGTD  - ID on consult, recs reviewed  - ICU management - stable for transfer  - abdominal pain dizziness, abdominal US pending r/o GB etiology      UTI  - Abnormal UA, cx neg  - CT evidence of hydronephrosis, bilateral  - In the setting of recent neobladder creation  - Continue IV cefepime and azithromycin  - Urology on consult, appreciate input  - Maintain Rodriguez     Acute on chronic anemia  - No gross bleeding, melena reported  - Given critically ill status, possible PUD versus stress ulcers remain in the differential-other supporting symptoms include nausea, poor appetite, vague abdominal pain  - Repeat H&H in the late afternoon  - Empiric PPI twice daily, can DC if hemoglobin remains stable     Carbuncle, R suprapubic region  - s/p bedside I&D by urology  - cx sent, await results    ELENA- resolved  - In the setting of UTI and sepsis  - Follow BMP     History of bladder cancer  - Status post recent neobladder creation at [facility] in March 2024  - Follows with [facility] urologist     History of pulmonary sarcoidosis  - Multiple nodules on CT chest, unclear if infection versus sarcoid related  - Plan to treat with IV antibiotics for now, may need outpatient monitoring with imaging  - Has chronic shortness of breath, currently bacteremic no indication for steroids at this time     Nonischemic cardiomyopathy  - Echo without any vegetation, normal EF  - Can resume aspirin and statin once out of the ICU     Hypertension  - Hold antihypertensives until BP  improves     Hyperlipidemia  - Hold statin for now     Acidosis, nongap - resolving   - Presenting bicarb 10, in the setting of sepsis     Type 2 diabetes with hyperglycemia  - Hold home insulin  - Accu-Cheks, increased to medium dose ISS, may need long-acting coverage when appetite improves     FN:  - IVF: per ICU   - Diet: Diabetic     DVT Prophy: SCDs, heparin subcu  Atrophy: Ambulate as tolerated  Lines: PIV     Dispo: [facility] for transfer   Outpatient records or previous hospital records reviewed.      Further recommendations pending patient's clinical course.  [facility] hospitalist to continue to follow patient while in house     Patient and/or patient's family given opportunity to ask questions and note understanding and agreeing with therapeutic plan as outlined     Thank You,    Edmundo Odom Hospitalist  Answering Service number: [phone]

## 2024-05-17 NOTE — PROGRESS NOTES
[facility]  Urology Progress Note    Yunior Garrett Patient Status:  Inpatient    1965 MRN [id number]   Location [facility] 4SW-A Attending Adrienne Chahal DO   Hosp Day # 3 PCP Antione Valadez DO     Subjective:  Yunior Garrett is a(n) 59 year old male.    Current complaints: Tmax 103 yesterday, most recent temp 98.9.  +abdominal pain. Had BM overnight.      Patient reports dizziness - has been going on for a while per patient.      Objective:  General appearance: alert, appears stated age, and cooperative  Blood pressure 138/67, pulse 91, temperature 98.9 °F (37.2 °C), temperature source Temporal, resp. rate 22, height 5' 5\" (1.651 m), weight 171 lb 11.8 oz (77.9 kg), SpO2 95%.  Lungs: non-labored respirations  Abdomen: soft, tender  : cardona catheter draining yellow urine (UOP - 3650 mL/24 hours)    S/P I and D site - no drainage noted.    Lab Results   Component Value Date    WBC 8.3 2024    HGB 7.9 2024    HCT 23.6 2024    .0 2024    CREATSERUM 1.20 2024    BUN 18 2024     2024    K 4.5 2024     2024    CO2 27.0 2024     2024    CA 7.8 2024    ALB 1.8 2024    ALKPHO 93 2024    BILT 0.4 2024    TP 6.4 2024    AST 25 2024    ALT 34 2024    PGLU 142 2024       Hospital Encounter on 24   1. Blood Culture     Status: None (Preliminary result)    Collection Time: 05/15/24  2:42 PM    Specimen: Bld,Central line; Blood   Result Value Ref Range    Blood Culture Result No Growth 1 Day N/A   2. Urine Culture, Routine     Status: None    Collection Time: 24  9:13 AM    Specimen: Urine, clean catch   Result Value Ref Range    Urine Culture  N/A     <10,000 cfu/ml Multiple species present- probable contamination.        No results found.     Assessment:    Septic shock  UTI  Bacteremia  Bilateral hydronephrosis/hydroureter  ELENA     Tmax 101.8 on  5/14, Tmax 102 on 5/15, Tmax 103 on 5/16, most recent temp 98.9  Lactic acid 1.2 > 1.1  WBC 23.3 > 19.8 > 10.3 > 6.4 > 7.1 > 8.3  Hgb 10.5 > 9.3 > 8.5 > 7.5 > 8.3 > 7.9  Platelet count 244 > 214 > 170 > 122 > 128 > 128  Serum creat 1.84 > 1.43 > 1.45 > 1.26 > 1.34 > 1.263 > 0.94 > 1.2  UA 5/14/24: >50 WBC/hpf, 6-10 RBC/hpf, rare bacteria  Urine culture 5/14/24: <10K CFU/mL multiple species present - probable contamination  2./2 blood cultures 5/14/24: Staph aureus, MRSA  Blood culture ID PCR 5/14/24: Staphylococcus aureus, MRS by PCR  2/2 blood cultures 5/15/24: prelim no growth after 1 days  2/2 blood cultures 5/16/24: pending  SARS-CoV-2/Flu A and B/RSV by PCR 5/14/24: negative  CTA chest, CT abdomen/pelvis 5/14/24: Pulmonary artery evaluation limited by bolus opacification, no large or central pulmonary artery embolism identified.  Multifocal inflammatory appearing changes, including multifocal areas of inflammatory appearing nodularity along, and reticular nodular interstitial changes.  Signs of airways inflammation are also present.  Concern for atypical infection.  No pleural effusion or pneumothorax.Reactive appearing lymph nodes in the chest.  Bilateral hydronephrosis and hydroureter.  Moderately dilated irregular shaped urinary bladder with areas of wall thickening.  No bladder stone or bladder gas collections. Urology referral for these findings advised. Renal cysts are present, but in addition to defined measurable cysts, there are smaller poorly defined areas of low attenuation not typical for cyst, and could reflect areas of pyelonephritis.     Rodriguez catheter placed - 1200 mL drained.       History of BCG unresponsive T1/Tcis bladder cancer   Status post radical cystectomy orthotopic neobladder on 3/14/24.     Status-post I and D of SP carbuncle 5/16/24 with Dr. Poon  Aerobic culture 5/16/24: prelim 3+ WBCs seen, 2+ gram positive cocci    Plan:    Check final culture results  Abx per ID  Follow labs,  temp, UOP  CPM with cardona catheter.  Nurse to hand irrigate cardona TID and prn  Renal US   Abdominal US has been ordered as well  Spoke with hospitalist and notified them of dizziness - hospitalist to see patient.    BREA Good [facility]  Department of Urology  5/17/2024  7:59 AM

## 2024-05-17 NOTE — CDS QUERY
CLINICAL DOCUMENTATION CLARIFICATION FORM  Dear Dr Chahal,  Please further specify the CKD diagnosis     [ x  ] Chronic Kidney Disease Stage 2 (mild) with eGFR 60-89  [   ] Chronic Kidney Disease Stage 3 (moderate) with eGFR 30-59  [   ] Clinically insignificant eGFR lab results without CKD  [   ] Other (please specify): __________________________    CLINICAL INDICATORS:   Labs: 5/14 - 5/17/24:  BUN 57*, 41*, 38*, 30*, 26*, 20, 18, 18   CREATSERUM 1,84*, 1.43*, 1.45*, 1.26, 1.34*, 1.23, 0.94, 1.20   eGFR: 41*, 56*, 55*, 65, 60, 67, 92, 69   -5/14/24 H&P: PMH: CKD Stage2  -5/16: Pulmonologist:  ELENA - from sepsis - resolved.Creat back to normal  -5/16 Hospitalist: ELENA- resolved- In the setting of UTI and sepsis  RISK FACTORS: Hx of CKD Stage2 - Calculus of kidney bladder cancer status post radical cystectomy orthotopic neobladder on 3/14/24.     TREATMENT: Serial labs BUN, Creat, eGFR - Urology consult - IV fluids                  Use of terms such as suspected, possible, or probable (associated with a specific diagnosis that is being evaluated, monitored, or treated as if it exists) are acceptable and can be coded in the inpatient setting, when documented at the time of discharge.     Please add any additional documentation to your progress note and continue to document this through discharge.  Leyda Lindsey RN, BSN, CWOCN [facility] Clinical  [phone]                                                                                    THIS FORM IS A PART OF THE PERMANENT MEDICAL RECORD

## 2024-05-17 NOTE — PROGRESS NOTES
[facility]   part of [facility] Infectious Disease  Progress Note    Yunior Garrett Patient Status:  Inpatient    1965 MRN [id number]   Location [facility] 4SW-A Attending Adrienne Chahal DO   Hosp Day # 3 PCP Antione Valadez DO     Yunior Garrett is a 59 year old male.   Chief Complaint   Patient presents with    Difficulty Breathing    Fall    Fatigue       HPI:    Fever to 103, then 101 overnite  Nausea                REVIEW OF SYSTEMS:   A comprehensive 10 point review of systems was completed.  Pertinent positives and negatives noted in the the HPI.       Allergies:  No Known Allergies     Current Meds:    Current Facility-Administered Medications:     HYDROcodone-acetaminophen (Norco) 5-325 MG per tab 1 tablet, 1 tablet, Oral, Q6H PRN    pantoprazole (Protonix) 40 mg in sodium chloride 0.9% PF 10 mL IV push, 40 mg, Intravenous, 2 times per day    ceFEPIme (Maxpime) 2 g in sodium chloride 0.9% 100 mL IVPB-MBP, 2 g, Intravenous, Q8H    vancomycin (Vancocin) 1.25 g in sodium chloride 0.9% 250mL IVPB premix, 1,250 mg, Intravenous, Q12H    insulin aspart (NovoLOG) 100 Units/mL FlexPen 2-10 Units, 2-10 Units, Subcutaneous, TID AC and HS    acetaminophen (Tylenol) tab 650 mg, 650 mg, Oral, Q6H PRN    glucose (Dex4) 15 GM/59ML oral liquid 15 g, 15 g, Oral, Q15 Min PRN **OR** glucose (Glutose) 40% oral gel 15 g, 15 g, Oral, Q15 Min PRN **OR** glucose-vitamin C (Dex-4) chewable tab 4 tablet, 4 tablet, Oral, Q15 Min PRN **OR** dextrose 50% injection 50 mL, 50 mL, Intravenous, Q15 Min PRN **OR** glucose (Dex4) 15 GM/59ML oral liquid 30 g, 30 g, Oral, Q15 Min PRN **OR** glucose (Glutose) 40% oral gel 30 g, 30 g, Oral, Q15 Min PRN **OR** glucose-vitamin C (Dex-4) chewable tab 8 tablet, 8 tablet, Oral, Q15 Min PRN    [Held by provider] enoxaparin (Lovenox) 40 MG/0.4ML SUBQ injection 40 mg, 40 mg, Subcutaneous, Daily    ondansetron (Zofran) 4 MG/2ML injection 4 mg, 4  mg, Intravenous, Q6H PRN    prochlorperazine (Compazine) 10 MG/2ML injection 5 mg, 5 mg, Intravenous, Q8H PRN    polyethylene glycol (PEG 3350) (Miralax) 17 g oral packet 17 g, 17 g, Oral, Daily PRN    sennosides (Senokot) tab 17.2 mg, 17.2 mg, Oral, Nightly PRN    bisacodyl (Dulcolax) 10 MG rectal suppository 10 mg, 10 mg, Rectal, Daily PRN    fleet enema (Fleet) 7-19 GM/118ML rectal enema 133 mL, 1 enema, Rectal, Once PRN    melatonin tab 3 mg, 3 mg, Oral, Nightly PRN   No current outpatient medications on file.        HISTORY:  Past Medical History:    Back problem    Benign essential HTN    Calculus of kidney    Cancer (HCC)    Melanoma    Diabetes (HCC)    High blood pressure    High cholesterol    Mixed hyperlipidemia    Osteoarthritis    Problems with swallowing    Renal disorder    CKD Stage2    Sarcoidosis    Sarcoidosis of lung (HCC)    Shortness of breath    Sleep apnea    Pt use CPAP at night    Type 2 diabetes mellitus without complication, without long-term current use of insulin (HCC)      Past Surgical History:   Procedure Laterality Date    Cath bare metal stent (bms)  10/2019    Colonoscopy N/A 9/29/2020    Procedure: COLONOSCOPY;  Surgeon: David Jo MD;  Location:  ENDOSCOPY    Other  07/2019    Left elbow surgery    Other surgical history  06/22/2022    Osteophytectomy cervical 2, cervical 3, cervical 4, cervical 5         Social history and family history negative related to present illness except as above.    PHYSICAL EXAM:   /68   Pulse 95   Temp 98.9 °F (37.2 °C) (Temporal)   Resp 21   Ht 5' 5\" (1.651 m)   Wt 171 lb 11.8 oz (77.9 kg)   SpO2 97%   BMI 28.58 kg/m²   GENERAL:  Awake, alert, oriented x3. Non-tox, non-septic and in NAD.  HEENT:  Normocephalic, no scleral abnormalities.  Oropharynx clear, trachea ML.  NECK:  Supple, no masses, no lymphadenopathy.  LUNGS:  Clear to auscultation b/l, no rhonchi, rales, or wheezes.  CARDIO: RRR S1/S2, no rubs,  clicks, heaves, or murmurs.  GI:  distended with ruq tender  EXTREMITIES:  No edema, no clubbing, no cyanosis.  NEURO:  No focal neurologic deficits.  DERM:  Warm, dry, no rashes.    IMPRESSION/PLAN:        Septic shock with mrsa bacteremia rx vanc; ct had bilateral hydro and now has cardona in his neobladder; most likely this is source; new temp so repeat study with ultrasound to check on hydro and as ruq tender check on g.b.  Carbuncle drained 5/16 too  Active urine sediment most likely from above; but will leave on cefepime till repeat blood and urine culture available  Multiple pulm nodules but with hx sarcoid; suggest getting us ct from [place] or taking our ct to [place] so they cna compare if any difference  No evidence endocarditis or seeded spine but these remain in ddx  Spoke with pt wife rn   Remains critically ill >35 min               Recent Results (from the past 72 hour(s))   POCT Glucose    Collection Time: 05/14/24  4:50 PM   Result Value Ref Range    POC Glucose 200 (H) 70 - 99 mg/dL   Basic Metabolic Panel (8)    Collection Time: 05/14/24  6:56 PM   Result Value Ref Range    Glucose 242 (H) 70 - 99 mg/dL    Sodium 139 136 - 145 mmol/L    Potassium 3.0 (L) 3.5 - 5.1 mmol/L    Chloride 115 (H) 98 - 112 mmol/L    CO2 14.0 (L) 21.0 - 32.0 mmol/L    Anion Gap 10 0 - 18 mmol/L    BUN 38 (H) 9 - 23 mg/dL    Creatinine 1.45 (H) 0.70 - 1.30 mg/dL    Calcium, Total 8.4 (L) 8.5 - 10.1 mg/dL    Calculated Osmolality 305 (H) 275 - 295 mOsm/kg    eGFR-Cr 55 (L) >=60 mL/min/1.73m2   POCT Glucose    Collection Time: 05/14/24  8:48 PM   Result Value Ref Range    POC Glucose 275 (H) 70 - 99 mg/dL   Comp Metabolic Panel (14)    Collection Time: 05/15/24  3:53 AM   Result Value Ref Range    Glucose 224 (H) 70 - 99 mg/dL    Sodium 139 136 - 145 mmol/L    Potassium 3.0 (L) 3.5 - 5.1 mmol/L    Chloride 114 (H) 98 - 112 mmol/L    CO2 18.0 (L) 21.0 - 32.0 mmol/L    Anion Gap 7 0 - 18 mmol/L    BUN 30 (H) 9 - 23 mg/dL     Creatinine 1.26 0.70 - 1.30 mg/dL    Calcium, Total 8.3 (L) 8.5 - 10.1 mg/dL    Calculated Osmolality 301 (H) 275 - 295 mOsm/kg    eGFR-Cr 65 >=60 mL/min/1.73m2    AST 19 15 - 37 U/L    ALT 34 16 - 61 U/L    Alkaline Phosphatase 83 45 - 117 U/L    Bilirubin, Total 0.3 0.1 - 2.0 mg/dL    Total Protein 6.7 6.4 - 8.2 g/dL    Albumin 1.9 (L) 3.4 - 5.0 g/dL    Globulin  4.8 (H) 2.8 - 4.4 g/dL    A/G Ratio 0.4 (L) 1.0 - 2.0   Potassium    Collection Time: 05/15/24  3:53 AM   Result Value Ref Range    Potassium 3.0 (L) 3.5 - 5.1 mmol/L   CBC W/ DIFFERENTIAL    Collection Time: 05/15/24  3:53 AM   Result Value Ref Range    WBC 10.3 4.0 - 11.0 x10(3) uL    RBC 3.08 (L) 4.30 - 5.70 x10(6)uL    HGB 8.5 (L) 13.0 - 17.5 g/dL    HCT 25.8 (L) 39.0 - 53.0 %    .0 150.0 - 450.0 10(3)uL    MCV 83.8 80.0 - 100.0 fL    MCH 27.6 26.0 - 34.0 pg    MCHC 32.9 31.0 - 37.0 g/dL    RDW 13.7 %    Neutrophil Absolute Prelim 9.44 (H) 1.50 - 7.70 x10 (3) uL    Neutrophil Absolute 9.44 (H) 1.50 - 7.70 x10(3) uL    Lymphocyte Absolute 0.29 (L) 1.00 - 4.00 x10(3) uL    Monocyte Absolute 0.48 0.10 - 1.00 x10(3) uL    Eosinophil Absolute 0.00 0.00 - 0.70 x10(3) uL    Basophil Absolute 0.02 0.00 - 0.20 x10(3) uL    Immature Granulocyte Absolute 0.05 0.00 - 1.00 x10(3) uL    Neutrophil % 91.8 %    Lymphocyte % 2.8 %    Monocyte % 4.7 %    Eosinophil % 0.0 %    Basophil % 0.2 %    Immature Granulocyte % 0.5 %   Vancomycin Peak, Serum    Collection Time: 05/15/24  4:19 AM   Result Value Ref Range    Vancomycin Peak 30.4 30.0 - 50.0 ug/mL   POCT Glucose    Collection Time: 05/15/24  6:28 AM   Result Value Ref Range    POC Glucose 259 (H) 70 - 99 mg/dL   Vancomycin Trough, Serum    Collection Time: 05/15/24 10:57 AM   Result Value Ref Range    Vancomycin Trough 14.6 10.0 - 20.0 ug/mL   Basic Metabolic Panel (8)    Collection Time: 05/15/24 10:57 AM   Result Value Ref Range    Glucose 367 (H) 70 - 99 mg/dL    Sodium 138 136 - 145 mmol/L    Potassium  3.0 (L) 3.5 - 5.1 mmol/L    Chloride 110 98 - 112 mmol/L    CO2 20.0 (L) 21.0 - 32.0 mmol/L    Anion Gap 8 0 - 18 mmol/L    BUN 26 (H) 9 - 23 mg/dL    Creatinine 1.34 (H) 0.70 - 1.30 mg/dL    Calcium, Total 7.7 (L) 8.5 - 10.1 mg/dL    Calculated Osmolality 306 (H) 275 - 295 mOsm/kg    eGFR-Cr 60 >=60 mL/min/1.73m2   POCT Glucose    Collection Time: 05/15/24 12:53 PM   Result Value Ref Range    POC Glucose 358 (H) 70 - 99 mg/dL   Potassium    Collection Time: 05/15/24  2:34 PM   Result Value Ref Range    Potassium 2.8 (LL) 3.5 - 5.1 mmol/L   Blood Culture    Collection Time: 05/15/24  2:34 PM    Specimen: Blood,peripheral   Result Value Ref Range    Blood Culture Result No Growth 1 Day    Magnesium    Collection Time: 05/15/24  2:34 PM   Result Value Ref Range    Magnesium 1.9 1.6 - 2.6 mg/dL   Blood Culture    Collection Time: 05/15/24  2:42 PM    Specimen: Bld,Central line; Blood   Result Value Ref Range    Blood Culture Result No Growth 1 Day    POCT Glucose    Collection Time: 05/15/24  4:06 PM   Result Value Ref Range    POC Glucose 272 (H) 70 - 99 mg/dL   Basic Metabolic Panel (8)    Collection Time: 05/15/24  6:55 PM   Result Value Ref Range    Glucose 239 (H) 70 - 99 mg/dL    Sodium 139 136 - 145 mmol/L    Potassium 3.1 (L) 3.5 - 5.1 mmol/L    Chloride 108 98 - 112 mmol/L    CO2 25.0 21.0 - 32.0 mmol/L    Anion Gap 6 0 - 18 mmol/L    BUN 20 9 - 23 mg/dL    Creatinine 1.23 0.70 - 1.30 mg/dL    Calcium, Total 7.7 (L) 8.5 - 10.1 mg/dL    Calculated Osmolality 298 (H) 275 - 295 mOsm/kg    eGFR-Cr 67 >=60 mL/min/1.73m2   POCT Glucose    Collection Time: 05/15/24  8:09 PM   Result Value Ref Range    POC Glucose 236 (H) 70 - 99 mg/dL   Potassium    Collection Time: 05/16/24  1:42 AM   Result Value Ref Range    Potassium 3.0 (L) 3.5 - 5.1 mmol/L   Basic Metabolic Panel (8)    Collection Time: 05/16/24  5:24 AM   Result Value Ref Range    Glucose 124 (H) 70 - 99 mg/dL    Sodium 141 136 - 145 mmol/L    Potassium 3.2  (L) 3.5 - 5.1 mmol/L    Chloride 107 98 - 112 mmol/L    CO2 32.0 21.0 - 32.0 mmol/L    Anion Gap 2 0 - 18 mmol/L    BUN 18 9 - 23 mg/dL    Creatinine 0.94 0.70 - 1.30 mg/dL    Calcium, Total 7.6 (L) 8.5 - 10.1 mg/dL    Calculated Osmolality 295 275 - 295 mOsm/kg    eGFR-Cr 92 >=60 mL/min/1.73m2   CBC W/ DIFFERENTIAL    Collection Time: 05/16/24  5:24 AM   Result Value Ref Range    WBC 6.4 4.0 - 11.0 x10(3) uL    RBC 2.68 (L) 4.30 - 5.70 x10(6)uL    HGB 7.5 (L) 13.0 - 17.5 g/dL    HCT 22.5 (L) 39.0 - 53.0 %    .0 (L) 150.0 - 450.0 10(3)uL    MCV 84.0 80.0 - 100.0 fL    MCH 28.0 26.0 - 34.0 pg    MCHC 33.3 31.0 - 37.0 g/dL    RDW 13.6 %    Neutrophil Absolute Prelim 5.47 1.50 - 7.70 x10 (3) uL    Neutrophil Absolute 5.47 1.50 - 7.70 x10(3) uL    Lymphocyte Absolute 0.39 (L) 1.00 - 4.00 x10(3) uL    Monocyte Absolute 0.48 0.10 - 1.00 x10(3) uL    Eosinophil Absolute 0.08 0.00 - 0.70 x10(3) uL    Basophil Absolute 0.00 0.00 - 0.20 x10(3) uL    Immature Granulocyte Absolute 0.02 0.00 - 1.00 x10(3) uL    Neutrophil % 84.9 %    Lymphocyte % 6.1 %    Monocyte % 7.5 %    Eosinophil % 1.2 %    Basophil % 0.0 %    Immature Granulocyte % 0.3 %   POCT Glucose    Collection Time: 05/16/24  5:41 AM   Result Value Ref Range    POC Glucose 123 (H) 70 - 99 mg/dL   Potassium    Collection Time: 05/16/24 10:26 AM   Result Value Ref Range    Potassium 3.4 (L) 3.5 - 5.1 mmol/L   POCT Glucose    Collection Time: 05/16/24 11:39 AM   Result Value Ref Range    POC Glucose 180 (H) 70 - 99 mg/dL   Heparin Induced Platelet    Collection Time: 05/16/24 12:27 PM   Result Value Ref Range    Heparin Induced Plt Antibody Negative Negative   Urinalysis, Routine    Collection Time: 05/16/24  1:25 PM   Result Value Ref Range    Urine Color Colorless (A) Yellow    Clarity Urine Clear Clear    Spec Gravity 1.009 1.005 - 1.030    Glucose Urine >1000 (A) Normal mg/dL    Bilirubin Urine Negative Negative    Ketones Urine Negative Negative mg/dL     Blood Urine 1+ (A) Negative    pH Urine 6.5 5.0 - 8.0    Protein Urine 20 (A) Negative mg/dL    Urobilinogen Urine Normal Normal mg/dL    Nitrite Urine Negative Negative    Leukocyte Esterase Urine 75 (A) Negative    WBC Urine 21-50 (A) 0 - 5 /HPF    RBC Urine 3-5 (A) 0 - 2 /HPF    Bacteria Urine None Seen None Seen /HPF    Squamous Epi. Cells None Seen None Seen /HPF    Renal Tubular Epithelial Cells None Seen None Seen /HPF    Transitional Cells None Seen None Seen /HPF    Yeast Urine None Seen None Seen /HPF   Urine Culture, Routine    Collection Time: 05/16/24  1:25 PM    Specimen: Urine, cardona catheter   Result Value Ref Range    Urine Culture No Growth at 18-24 hrs.    CBC W/ DIFFERENTIAL    Collection Time: 05/16/24  4:18 PM   Result Value Ref Range    WBC 7.1 4.0 - 11.0 x10(3) uL    RBC 2.91 (L) 4.30 - 5.70 x10(6)uL    HGB 8.3 (L) 13.0 - 17.5 g/dL    HCT 24.3 (L) 39.0 - 53.0 %    .0 (L) 150.0 - 450.0 10(3)uL    MCV 83.5 80.0 - 100.0 fL    MCH 28.5 26.0 - 34.0 pg    MCHC 34.2 31.0 - 37.0 g/dL    RDW 13.9 %    Neutrophil Absolute Prelim 6.05 1.50 - 7.70 x10 (3) uL    Neutrophil Absolute 6.05 1.50 - 7.70 x10(3) uL    Lymphocyte Absolute 0.43 (L) 1.00 - 4.00 x10(3) uL    Monocyte Absolute 0.48 0.10 - 1.00 x10(3) uL    Eosinophil Absolute 0.07 0.00 - 0.70 x10(3) uL    Basophil Absolute 0.01 0.00 - 0.20 x10(3) uL    Immature Granulocyte Absolute 0.03 0.00 - 1.00 x10(3) uL    Neutrophil % 85.6 %    Lymphocyte % 6.1 %    Monocyte % 6.8 %    Eosinophil % 1.0 %    Basophil % 0.1 %    Immature Granulocyte % 0.4 %   Aerobic Bacterial Culture    Collection Time: 05/16/24  5:29 PM    Specimen: perineal; Other   Result Value Ref Range    Aerobic Smear 3+ WBCs seen     Aerobic Smear 2+ Gram Positive Cocci    POCT Glucose    Collection Time: 05/16/24  5:32 PM   Result Value Ref Range    POC Glucose 124 (H) 70 - 99 mg/dL   Potassium    Collection Time: 05/16/24  9:18 PM   Result Value Ref Range    Potassium 4.2 3.5 -  5.1 mmol/L   POCT Glucose    Collection Time: 05/16/24  9:19 PM   Result Value Ref Range    POC Glucose 142 (H) 70 - 99 mg/dL   Comp Metabolic Panel (14)    Collection Time: 05/17/24  4:53 AM   Result Value Ref Range    Glucose 161 (H) 70 - 99 mg/dL    Sodium 139 136 - 145 mmol/L    Potassium 4.5 3.5 - 5.1 mmol/L    Chloride 107 98 - 112 mmol/L    CO2 27.0 21.0 - 32.0 mmol/L    Anion Gap 5 0 - 18 mmol/L    BUN 18 9 - 23 mg/dL    Creatinine 1.20 0.70 - 1.30 mg/dL    Calcium, Total 7.8 (L) 8.5 - 10.1 mg/dL    Calculated Osmolality 293 275 - 295 mOsm/kg    eGFR-Cr 69 >=60 mL/min/1.73m2    AST 25 15 - 37 U/L    ALT 34 16 - 61 U/L    Alkaline Phosphatase 93 45 - 117 U/L    Bilirubin, Total 0.4 0.1 - 2.0 mg/dL    Total Protein 6.4 6.4 - 8.2 g/dL    Albumin 1.8 (L) 3.4 - 5.0 g/dL    Globulin  4.6 (H) 2.8 - 4.4 g/dL    A/G Ratio 0.4 (L) 1.0 - 2.0   CBC W/ DIFFERENTIAL    Collection Time: 05/17/24  4:53 AM   Result Value Ref Range    WBC 8.3 4.0 - 11.0 x10(3) uL    RBC 2.80 (L) 4.30 - 5.70 x10(6)uL    HGB 7.9 (L) 13.0 - 17.5 g/dL    HCT 23.6 (L) 39.0 - 53.0 %    .0 (L) 150.0 - 450.0 10(3)uL    MCV 84.3 80.0 - 100.0 fL    MCH 28.2 26.0 - 34.0 pg    MCHC 33.5 31.0 - 37.0 g/dL    RDW 13.7 %    Neutrophil Absolute Prelim 7.04 1.50 - 7.70 x10 (3) uL    Neutrophil Absolute 7.04 1.50 - 7.70 x10(3) uL    Lymphocyte Absolute 0.45 (L) 1.00 - 4.00 x10(3) uL    Monocyte Absolute 0.73 0.10 - 1.00 x10(3) uL    Eosinophil Absolute 0.04 0.00 - 0.70 x10(3) uL    Basophil Absolute 0.01 0.00 - 0.20 x10(3) uL    Immature Granulocyte Absolute 0.04 0.00 - 1.00 x10(3) uL    Neutrophil % 84.7 %    Lymphocyte % 5.4 %    Monocyte % 8.8 %    Eosinophil % 0.5 %    Basophil % 0.1 %    Immature Granulocyte % 0.5 %   POCT Glucose    Collection Time: 05/17/24  7:57 AM   Result Value Ref Range    POC Glucose 159 (H) 70 - 99 mg/dL   POCT Glucose    Collection Time: 05/17/24 11:23 AM   Result Value Ref Range    POC Glucose 157 (H) 70 - 99 mg/dL          Navi Chow MD     CALL [facility] AT [phone] IF QUESTIONS OR CONCERNS  THANKS

## 2024-05-17 NOTE — PROGRESS NOTES
[facility]    Yunior Garrett Patient Status:  Inpatient    1965 MRN [id number]   Location [facility] 4SW-A Attending Adrienne Chahal DO   Hosp Day # 3 PCP Antione Valadez DO     Critical Care Progress Note     Date of Admission: 2024  4:43 AM  Admission Diagnosis: Metabolic acidosis [E87.20]  Sepsis, due to unspecified organism, unspecified whether acute organ dysfunction present (HCC) [A41.9]     S: still c/o abdominal pain.  Had carbuncle excised yesterday      Current Medications:    Current Facility-Administered Medications:     HYDROcodone-acetaminophen (Norco) 5-325 MG per tab 1 tablet, 1 tablet, Oral, Q6H PRN    pantoprazole (Protonix) 40 mg in sodium chloride 0.9% PF 10 mL IV push, 40 mg, Intravenous, 2 times per day    ceFEPIme (Maxpime) 2 g in sodium chloride 0.9% 100 mL IVPB-MBP, 2 g, Intravenous, Q8H    vancomycin (Vancocin) 1.25 g in sodium chloride 0.9% 250mL IVPB premix, 1,250 mg, Intravenous, Q12H    insulin aspart (NovoLOG) 100 Units/mL FlexPen 2-10 Units, 2-10 Units, Subcutaneous, TID AC and HS    acetaminophen (Tylenol) tab 650 mg, 650 mg, Oral, Q6H PRN    glucose (Dex4) 15 GM/59ML oral liquid 15 g, 15 g, Oral, Q15 Min PRN **OR** glucose (Glutose) 40% oral gel 15 g, 15 g, Oral, Q15 Min PRN **OR** glucose-vitamin C (Dex-4) chewable tab 4 tablet, 4 tablet, Oral, Q15 Min PRN **OR** dextrose 50% injection 50 mL, 50 mL, Intravenous, Q15 Min PRN **OR** glucose (Dex4) 15 GM/59ML oral liquid 30 g, 30 g, Oral, Q15 Min PRN **OR** glucose (Glutose) 40% oral gel 30 g, 30 g, Oral, Q15 Min PRN **OR** glucose-vitamin C (Dex-4) chewable tab 8 tablet, 8 tablet, Oral, Q15 Min PRN    [Held by provider] enoxaparin (Lovenox) 40 MG/0.4ML SUBQ injection 40 mg, 40 mg, Subcutaneous, Daily    ondansetron (Zofran) 4 MG/2ML injection 4 mg, 4 mg, Intravenous, Q6H PRN    prochlorperazine (Compazine) 10 MG/2ML injection 5 mg, 5 mg, Intravenous, Q8H PRN    polyethylene glycol (PEG 3350) (Miralax) 17  g oral packet 17 g, 17 g, Oral, Daily PRN    sennosides (Senokot) tab 17.2 mg, 17.2 mg, Oral, Nightly PRN    bisacodyl (Dulcolax) 10 MG rectal suppository 10 mg, 10 mg, Rectal, Daily PRN    fleet enema (Fleet) 7-19 GM/118ML rectal enema 133 mL, 1 enema, Rectal, Once PRN    melatonin tab 3 mg, 3 mg, Oral, Nightly PRN     OBJECTIVE:  /64 (BP Location: Left arm)   Pulse 88   Temp 98.7 °F (37.1 °C) (Temporal)   Resp 24   Ht 165.1 cm (5' 5\")   Wt 171 lb 11.8 oz (77.9 kg)   SpO2 97%   BMI 28.58 kg/m²    on room air      Wt Readings from Last 3 Encounters:   05/17/24 171 lb 11.8 oz (77.9 kg)   06/22/22 170 lb (77.1 kg)   04/05/22 175 lb (79.4 kg)        I/O last 3 completed shifts:  In: 5786 [P.O.:1640; I.V.:2746; IV PIGGYBACK:1400]  Out: 5050 [Urine:5050]  I/O this shift:  In: 100 [P.O.:100]  Out: 500 [Urine:500]     General appearance: alert, appears stated age, cooperative, and no distress  Lungs: clear to auscultation bilaterally  Heart: regular rate and rhythm  Abdomen:  soft, mild diffuse tenderness in abd, +BS  Extremities: extremities normal, atraumatic, no cyanosis or edema     Lab Results   Component Value Date    WBC 8.3 05/17/2024    RBC 2.80 05/17/2024    HGB 7.9 05/17/2024    HCT 23.6 05/17/2024    MCV 84.3 05/17/2024    MCH 28.2 05/17/2024    MCHC 33.5 05/17/2024    RDW 13.7 05/17/2024    .0 05/17/2024     Lab Results   Component Value Date     05/17/2024    K 4.5 05/17/2024     05/17/2024    CO2 27.0 05/17/2024    BUN 18 05/17/2024    CREATSERUM 1.20 05/17/2024     05/17/2024    CA 7.8 05/17/2024    ALKPHO 93 05/17/2024    ALT 34 05/17/2024    AST 25 05/17/2024    BILT 0.4 05/17/2024    ALB 1.8 05/17/2024    TP 6.4 05/17/2024     Lab Results   Component Value Date    INR 1.16 05/14/2024         Imaging: reviewed. Per EMR     Assessment/Plan:  Septic shock from UTI and multifocal pneumonia. +MRSA bacteremia  - Weaned off pressors   - lactate normalized  - echo to r/o  septic emboli/endocarditis was unremarkable  - stop  IV fluids  - ID following- abx per them  UTI - moderate bilateral hydronephrosis on CT imaging, unclear if this is related to neobladder creation vs intrarenal obstruction or from sepsis  - Cefepime + IV vanco   - Urine culture pending  ELENA -  from sepsis - improved. Creat back to normal  - stop bicarb gtt  - monitor UO/lytes  Abdominal pain- unclear etiology.  US abd and renal US pending for further evaluation  - need to r/o cholecystitis  - CT abd on 5/14 did not reveal exact etiology  - no reported N/V/D  - per IM/  Hydronephrosis  - Renal US 2-3 days  - Rodriguez catheter for decompression  -  per urology  Bladder CA - recent orthotopic neobladder creation at [facility] in March 2024 for BCG unresponsive bladder CA  - Per urology  Skin carbuncle near surgical incision site- s/p I&D.  Appears much better  Pulmonary sarcoidosis - no signs of flare  Cardiomyopathy, nonischemic  - Echo unremarkable  NAGMA, possibly from urinary losses vs RTA  - resolved.  Off bicarb gtt  Heme- thrombocytopenia- likely hemodilution  - stop hep for now, check HIT  F/E/N- ADAT, lytes prn, gentle hydration  Proph - Heparin, SCD  Dispo- Full code  - transfer to floor.  Will follow in ICU only    Justice Solano MD  5/17/2024  10:17 AM

## 2024-05-17 NOTE — PLAN OF CARE
VSS off of vasopressors. Having low grade temps. Blood cultures redrawn per orders. Medicated with norco for  pain with relief. Rodriguez patent. Irrigated x1. Waiting for medical bed.  Problem: Diabetes/Glucose Control  Goal: Glucose maintained within prescribed range  Description: INTERVENTIONS:  - Monitor Blood Glucose as ordered  - Assess for signs and symptoms of hyperglycemia and hypoglycemia  - Administer ordered medications to maintain glucose within target range  - Assess barriers to adequate nutritional intake and initiate nutrition consult as needed  - Instruct patient on self management of diabetes  Outcome: Progressing     Problem: SAFETY ADULT - FALL  Goal: Free from fall injury  Description: INTERVENTIONS:  - Assess pt frequently for physical needs  - Identify cognitive and physical deficits and behaviors that affect risk of falls.  - [place] fall precautions as indicated by assessment.  - Educate pt/family on patient safety including physical limitations  - Instruct pt to call for assistance with activity based on assessment  - Modify environment to reduce risk of injury  - Provide assistive devices as appropriate  - Consider OT/PT consult to assist with strengthening/mobility  - Encourage toileting schedule  Outcome: Progressing     Problem: RESPIRATORY - ADULT  Goal: Achieves optimal ventilation and oxygenation  Description: INTERVENTIONS:  - Assess for changes in respiratory status  - Assess for changes in mentation and behavior  - Position to facilitate oxygenation and minimize respiratory effort  - Oxygen supplementation based on oxygen saturation or ABGs  - Provide Smoking Cessation handout, if applicable  - Encourage broncho-pulmonary hygiene including cough, deep breathe, Incentive Spirometry  - Assess the need for suctioning and perform as needed  - Assess and instruct to report SOB or any respiratory difficulty  - Respiratory Therapy support as indicated  - Manage/alleviate anxiety  - Monitor  for signs/symptoms of CO2 retention  Outcome: Progressing     Problem: METABOLIC/FLUID AND ELECTROLYTES - ADULT  Goal: Electrolytes maintained within normal limits  Description: INTERVENTIONS:  - Monitor labs and rhythm and assess patient for signs and symptoms of electrolyte imbalances  - Administer electrolyte replacement as ordered  - Monitor response to electrolyte replacements, including rhythm and repeat lab results as appropriate  - Fluid restriction as ordered  - Instruct patient on fluid and nutrition restrictions as appropriate  Outcome: Progressing

## 2024-05-18 ENCOUNTER — APPOINTMENT (OUTPATIENT)
Dept: ULTRASOUND IMAGING | Facility: HOSPITAL | Age: 59
DRG: 871 | End: 2024-05-18
Attending: INTERNAL MEDICINE

## 2024-05-18 ENCOUNTER — APPOINTMENT (OUTPATIENT)
Dept: CT IMAGING | Facility: HOSPITAL | Age: 59
DRG: 871 | End: 2024-05-18
Attending: HOSPITALIST

## 2024-05-18 ENCOUNTER — APPOINTMENT (OUTPATIENT)
Dept: CT IMAGING | Facility: HOSPITAL | Age: 59
DRG: 871 | End: 2024-05-18
Attending: INTERNAL MEDICINE

## 2024-05-18 LAB
ALBUMIN SERPL-MCNC: 1.6 G/DL (ref 3.4–5)
ALBUMIN/GLOB SERPL: 0.3 {RATIO} (ref 1–2)
ALP LIVER SERPL-CCNC: 93 U/L
ALT SERPL-CCNC: 34 U/L
ANION GAP SERPL CALC-SCNC: 5 MMOL/L (ref 0–18)
ANTIBODY SCREEN: NEGATIVE
AST SERPL-CCNC: 31 U/L (ref 15–37)
BASOPHILS # BLD AUTO: 0.01 X10(3) UL (ref 0–0.2)
BASOPHILS NFR BLD AUTO: 0.1 %
BILIRUB SERPL-MCNC: 0.3 MG/DL (ref 0.1–2)
BUN BLD-MCNC: 22 MG/DL (ref 9–23)
CALCIUM BLD-MCNC: 7.8 MG/DL (ref 8.5–10.1)
CHLORIDE SERPL-SCNC: 102 MMOL/L (ref 98–112)
CO2 SERPL-SCNC: 26 MMOL/L (ref 21–32)
CREAT BLD-MCNC: 1.64 MG/DL
EGFRCR SERPLBLD CKD-EPI 2021: 47 ML/MIN/1.73M2 (ref 60–?)
EOSINOPHIL # BLD AUTO: 0.14 X10(3) UL (ref 0–0.7)
EOSINOPHIL NFR BLD AUTO: 1.9 %
ERYTHROCYTE [DISTWIDTH] IN BLOOD BY AUTOMATED COUNT: 13.6 %
GLOBULIN PLAS-MCNC: 4.7 G/DL (ref 2.8–4.4)
GLUCOSE BLD-MCNC: 154 MG/DL (ref 70–99)
GLUCOSE BLD-MCNC: 172 MG/DL (ref 70–99)
GLUCOSE BLD-MCNC: 237 MG/DL (ref 70–99)
GLUCOSE BLD-MCNC: 299 MG/DL (ref 70–99)
GLUCOSE BLD-MCNC: 300 MG/DL (ref 70–99)
HCT VFR BLD AUTO: 21.1 %
HCT VFR BLD AUTO: 24.3 %
HCT VFR BLD AUTO: 25.5 %
HGB BLD-MCNC: 6.7 G/DL
HGB BLD-MCNC: 7.9 G/DL
HGB BLD-MCNC: 8 G/DL
HGB BLD-MCNC: 8.1 G/DL
IMM GRANULOCYTES # BLD AUTO: 0.05 X10(3) UL (ref 0–1)
IMM GRANULOCYTES NFR BLD: 0.7 %
LYMPHOCYTES # BLD AUTO: 0.51 X10(3) UL (ref 1–4)
LYMPHOCYTES NFR BLD AUTO: 6.8 %
MCH RBC QN AUTO: 27.2 PG (ref 26–34)
MCHC RBC AUTO-ENTMCNC: 31.8 G/DL (ref 31–37)
MCV RBC AUTO: 85.8 FL
MONOCYTES # BLD AUTO: 0.82 X10(3) UL (ref 0.1–1)
MONOCYTES NFR BLD AUTO: 11 %
NEUTROPHILS # BLD AUTO: 5.94 X10 (3) UL (ref 1.5–7.7)
NEUTROPHILS # BLD AUTO: 5.94 X10(3) UL (ref 1.5–7.7)
NEUTROPHILS NFR BLD AUTO: 79.5 %
OSMOLALITY SERPL CALC.SUM OF ELEC: 283 MOSM/KG (ref 275–295)
PLATELET # BLD AUTO: 131 10(3)UL (ref 150–450)
POTASSIUM SERPL-SCNC: 4.1 MMOL/L (ref 3.5–5.1)
PROT SERPL-MCNC: 6.3 G/DL (ref 6.4–8.2)
RBC # BLD AUTO: 2.46 X10(6)UL
RH BLOOD TYPE: POSITIVE
RH BLOOD TYPE: POSITIVE
SODIUM SERPL-SCNC: 133 MMOL/L (ref 136–145)
VANCOMYCIN TROUGH SERPL-MCNC: 25.2 UG/ML (ref 10–20)
WBC # BLD AUTO: 7.5 X10(3) UL (ref 4–11)

## 2024-05-18 PROCEDURE — 30233N1 TRANSFUSION OF NONAUTOLOGOUS RED BLOOD CELLS INTO PERIPHERAL VEIN, PERCUTANEOUS APPROACH: ICD-10-PCS | Performed by: HOSPITALIST

## 2024-05-18 PROCEDURE — 99233 SBSQ HOSP IP/OBS HIGH 50: CPT | Performed by: INTERNAL MEDICINE

## 2024-05-18 PROCEDURE — 70450 CT HEAD/BRAIN W/O DYE: CPT | Performed by: INTERNAL MEDICINE

## 2024-05-18 PROCEDURE — 74176 CT ABD & PELVIS W/O CONTRAST: CPT | Performed by: HOSPITALIST

## 2024-05-18 PROCEDURE — 76700 US EXAM ABDOM COMPLETE: CPT | Performed by: INTERNAL MEDICINE

## 2024-05-18 RX ORDER — SODIUM CHLORIDE 9 MG/ML
INJECTION, SOLUTION INTRAVENOUS ONCE
Status: COMPLETED | OUTPATIENT
Start: 2024-05-18 | End: 2024-05-18

## 2024-05-18 RX ORDER — VANCOMYCIN HYDROCHLORIDE
1250 EVERY 24 HOURS
Status: DISCONTINUED | OUTPATIENT
Start: 2024-05-19 | End: 2024-05-18

## 2024-05-18 NOTE — PROGRESS NOTES
[facility] Pharmacy Dosing Service      Follow Up Pharmacokinetic Consult for Vancomycin Dosing     Yunior Garrett is a 59 year old male who is receiving vancomycin therapy for MRSA  bacteremia. Patient is on day 4 of vancomycin and is currently receiving 1250 mg IV every 12 hours. The current treatment and monitoring approach is steady state AUC strategy.        Weight and Temperature:    Wt Readings from Last 1 Encounters:   24 77.9 kg (171 lb 11.8 oz)         Temp Readings from Last 1 Encounters:   24 99.9 °F (37.7 °C)      Labs:   Recent Labs   Lab 24  0524 24  0453 24  0227   CREATSERUM 0.94 1.20 1.64*      Estimated Creatinine Clearance: 41.1 mL/min (A) (based on SCr of 1.64 mg/dL (H)).     Recent Labs   Lab 24  1618 24  0453 24  0227   WBC 7.1 8.3 7.5        Vancomycin Levels:  Lab Results   Component Value Date/Time    VANCT 25.2 (H) 2024 02:27 AM    VANCT 14.6 05/15/2024 10:57 AM    VANCP 30.4 05/15/2024 04:19 AM       Corresponding 24 h-AUC: N/A     The Pharmacokinetic Target is:       to 600 mg-h/L and trough <=15 mg/L    Renal Dosing Considerations:      ELENA/ARF     Assessment/Plan:     Maintenance Regimen: Hold Vancomycin.    Monitorin) Plan for vancomycin random level to be obtained in approximately 24 hours.    2) Pharmacy will order SCr as clinically indicated to assess renal function.    3) Pharmacy will monitor for toxicity and efficacy, adjust vancomycin dose and/or frequency, and order vancomycin levels as appropriate per the Pharmacy and Therapeutics Committee approved protocol until discontinuation of the medication.       We appreciate the opportunity to assist in the care of this patient.     Manohar OlmosD  2024  3:32 AM  [facility]: [phone]

## 2024-05-18 NOTE — PROGRESS NOTES
CRITICAL CARE            S: Pt is still having abdominal pain. BP is stable today. He is still having fevers.     Meds:   Vancomycin IV  1 each Intravenous See Admin Instructions (RX holding)    insulin aspart  2-10 Units Subcutaneous TID CC and HS    pantoprazole  40 mg Intravenous 2 times per day    enoxaparin  40 mg Subcutaneous Daily       Prn Meds:    diphenhydrAMINE    LORazepam    HYDROcodone-acetaminophen    acetaminophen    glucose **OR** glucose **OR** glucose-vitamin C **OR** dextrose **OR** glucose **OR** glucose **OR** glucose-vitamin C    ondansetron    prochlorperazine    polyethylene glycol (PEG 3350)    sennosides    bisacodyl    fleet enema    melatonin    Infusions:      OBJECTIVE:  Vitals:    05/18/24 0900 05/18/24 1000 05/18/24 1100 05/18/24 1200   BP:  122/64  137/89   Pulse: 84 93 90 94   Resp: 26 19 26 19   Temp:    98.6 °F (37 °C)   TempSrc:    Temporal   SpO2: 96% 96% 94% 99%   Weight:       Height:         O2: room air    Gen - Alert, oriented x 3, in no apparent distress  Lungs - CTAB  CV - regular rate & rhythm. Normal S1, S2. No murmurs, rubs, or gallops appreciated.  Abdomen - mild diffuse tenderness to palpation  Extremities - BLE edema        Labs:  Recent Labs   Lab 05/16/24  1618 05/17/24  0453 05/18/24  0227 05/18/24  0922   WBC 7.1 8.3 7.5  --    HGB 8.3* 7.9* 6.7* 8.1*   .0* 128.0* 131.0*  --      Recent Labs   Lab 05/14/24  0523 05/14/24  0524 05/14/24  1400 05/14/24  1856 05/15/24  0353 05/15/24  1057 05/15/24  1434 05/15/24  1855 05/16/24  0524 05/16/24  1026 05/16/24  2118 05/17/24  0453 05/18/24  0227   NA  --    < >  --    < > 139   < >  --    < > 141  --   --  139 133*   K  --    < >  --    < > 3.0*  3.0*   < > 2.8*   < > 3.2*   < > 4.2 4.5 4.1   CL  --    < >  --    < > 114*   < >  --    < > 107  --   --  107 102   CO2  --    < >  --    < > 18.0*   < >  --    < > 32.0  --   --  27.0 26.0   BUN  --    < >  --    < > 30*   < >  --    < > 18  --   --  18 22    CREATSERUM  --    < >  --    < > 1.26   < >  --    < > 0.94  --   --  1.20 1.64*   GLU  --    < >  --    < > 224*   < >  --    < > 124*  --   --  161* 172*   ANIONGAP  --    < >  --    < > 7   < >  --    < > 2  --   --  5 5   ALB  --    < >  --   --  1.9*  --   --   --   --   --   --  1.8* 1.6*   CA  --    < >  --    < > 8.3*   < >  --    < > 7.6*  --   --  7.8* 7.8*   MG  --   --   --   --   --   --  1.9  --   --   --   --   --   --    ALKPHO  --    < >  --   --  83  --   --   --   --   --   --  93 93   AST  --    < >  --   --  19  --   --   --   --   --   --  25 31   ALT  --    < >  --   --  34  --   --   --   --   --   --  34 34   BILT  --    < >  --   --  0.3  --   --   --   --   --   --  0.4 0.3   TP  --    < >  --   --  6.7  --   --   --   --   --   --  6.4 6.3*   LACTI 1.2  --  1.1  --   --   --   --   --   --   --   --   --   --     < > = values in this interval not displayed.     Recent Labs   Lab 05/14/24 0524   INR 1.16   PTT 33.0     Recent Labs   Lab 05/14/24 0524 05/14/24  0731   TROPHS 12 19         Recent Labs   Lab 05/14/24  0523   COVID19 Not Detected   INFAPCR Negative   INFBPCR Negative   RSV Negative       Imaging reviewed    ASSESSMENT AND PLAN      Septic shock - due to pneumonia, MRSA bacteremia, infected suprapubic carbuncle, UTI. Weaned off pressors, lactate normalized. Echo to r/o septic emboli/endocarditis was unremarkable. Still having fevers.   - continue cefepime + IV vanco per ID  - follow up cultures  ELENA - with bilateral hydronephrosis on imaging - SCr worse today.   - monitor UO/lytes  - workup of hydronephrosis per urology  Abdominal pain- unclear etiology. Ultrasound shows bilateral hydronephrosis and biliary sludge  - repeat CT A/P today  Bilateral Hydronephrosis - noted on CT and again on ultrasound   - Rodriguez catheter for decompression  - per urology  - repeat CT scan a/p today  Bladder CA - s/p cystectomy, orthotopic neobladder creation at [facility] in March 2024    - Per  urology. Normally follows at [facility]  Skin carbuncle near surgical incision site- s/p I&D 5/16  -continue antibiotics   Pulmonary sarcoidosis - no signs of flare  -outpatient follow up   Cardiomyopathy, nonischemic- Echo unremarkable  -supportive care   Thrombocytopenia- likely hemodilution. HIPA negative   - monitor plts   Nutrition  -carb controlled diet  Proph   -LMWH  Dispo  - Full code  - transfer to floor.  Will follow in ICU only.  - Discussed w/ hospitalist, as pt is still having fevers and persistent hydronephrosis; we are looking into transfer to [facility] where his primary urologist is    Geovani Pichardo M.D.  Pulmonary/Critical Care     Addendum    Pt is complaining of blurry vision -- CT brain ordered.    Geovani Pichardo M.D.  Pulmonary/Critical Care

## 2024-05-18 NOTE — PROGRESS NOTES
[facility]  Urology Progress Note    Yunior Garrett Patient Status:  Inpatient    1965 MRN [id number]   Location [facility] 4SW-A Attending Adrienne Chahal DO   Hosp Day # 4 PCP Antione Valadez DO     Subjective:  Yunior Garrett is a(n) 59 year old male.    He is feeling a little better. Dizzy when he got up overnight. Febrile to 101.7 overnight.    Objective:  Temp (24hrs), Av.2 °F (37.3 °C), Min:97.8 °F (36.6 °C), Max:101.7 °F (38.7 °C)    /62   Pulse 77   Temp 98.1 °F (36.7 °C)   Resp 26   Ht 5' 5\" (1.651 m)   Wt 170 lb 6.7 oz (77.3 kg)   SpO2 94%   BMI 28.36 kg/m²     Intake/Output Summary (Last 24 hours) at 2024 0854  Last data filed at 2024 0805  Gross per 24 hour   Intake 914 ml   Output 2450 ml   Net -1536 ml     General: Calm, NAD  HEENT: NCAT, no scleral icterus  CV: RR  Pulmonary: breathing unlabored, symmetric bilaterally, no audible wheezes  Abdomen: soft, NT, ND, no masses, no rebound, no guarding, no rigidity  RLQ abscess site incision c/d/i  Rodriguez catheter present, off traction, draining clear urine to gravity  Lab Results   Component Value Date    WBC 7.5 2024    HGB 6.7 2024    HCT 21.1 2024    .0 2024    CREATSERUM 1.64 2024    BUN 22 2024     2024    K 4.1 2024     2024    CO2 26.0 2024     2024    CA 7.8 2024    ALB 1.6 2024    ALKPHO 93 2024    BILT 0.3 2024    TP 6.3 2024    AST 31 2024    ALT 34 2024    PGLU 190 2024       Hospital Encounter on 24   1. Blood Culture     Status: None (Preliminary result)    Collection Time: 24  9:18 PM    Specimen: Blood,peripheral   Result Value Ref Range    Blood Culture Result No Growth 1 Day N/A   2. Aerobic Bacterial Culture     Status: Abnormal (Preliminary result)    Collection Time: 24  5:29 PM    Specimen: perineal; Other   Result Value Ref  Range    Aerobic Culture Result 2+ growth Staphylococcus aureus (A) N/A    Aerobic Smear 3+ WBCs seen N/A    Aerobic Smear 2+ Gram Positive Cocci N/A   3. Urine Culture, Routine     Status: None    Collection Time: 05/16/24  1:25 PM    Specimen: Urine, cardona catheter   Result Value Ref Range    Urine Culture No Growth at 18-24 hrs. N/A    Abscess cx MRSA    US ABDOMEN COMPLETE (CPT=76700)    Result Date: 5/18/2024  CONCLUSION:  1. Mild to moderate bilateral hydronephrosis is noted similar to prior CT scan. 2. There is echogenic debris within gallbladder lumen consistent with gallbladder sludge.  There is otherwise no specific evidence of cholecystitis. 3. Complicated lesions in both kidneys described on CT are not identified on ultrasound.  Follow-up dedicated three-phase CT or MRI of kidneys should be considered.   LOCATION:  [place]    Dictated by (CST): Scottie Allred MD on 5/18/2024 at 8:43 AM     Finalized by (CST): Scottie Allred MD on 5/18/2024 at 8:46 AM         Assessment:    Septic shock  Bacteremia  Bilateral hydronephrosis/hydroureter  ELENA     Tm 101.7 overnight, UCx neg     History of BCG unresponsive T1/Tcis bladder cancer   Status post radical cystectomy orthotopic neobladder on 3/14/24.     Status-post I and D of SP carbuncle 5/16/24 with Dr. Cleve Skinner with 2+ Staph aureus on prelim    Plan:    Continue abx per ID and await final cultures  Continued cardona  Await US results     Patient and wife verbalized understanding and all questions answered.      Discussed with KELECHI Calix D.O.  [facility]

## 2024-05-18 NOTE — PHYSICAL THERAPY NOTE
PHYSICAL THERAPY EVALUATION - INPATIENT     Room Number: 468/468-A  Evaluation Date: 5/18/2024  Type of Evaluation: Initial  Physician Order: PT Eval and Treat    Presenting Problem: Sepsis  Co-Morbidities : PNA, bladder CA  Reason for Therapy: Mobility Dysfunction and Discharge Planning    PHYSICAL THERAPY ASSESSMENT   Patient is currently functioning below baseline with bed mobility, transfers, and gait.  Prior to admission, patient's baseline is mod I in his mobilities and is amb with a tri-wheeled walker and uses a scooter in longer distances.  Patient is requiring minimal assist as a result of the following impairments: decreased endurance/aerobic capacity, impaired standing  balance, and increased O2 needs from baseline.  Physical Therapy will continue to follow for duration of hospitalization.    Patient will benefit from continued skilled PT Services at discharge to promote functional independence in home.  Anticipate patient will return home with home health PT.    PLAN  PT Treatment Plan: Bed mobility;Body mechanics;Patient education;Gait training;Strengthening;Stair training;Transfer training;Balance training;Range of motion;Family education  Rehab Potential : Good  Frequency (Obs): 3-5x/week  Number of Visits to Meet Established Goals: 5      CURRENT GOALS    Goal #1 Patient is able to demonstrate supine - sit EOB @ level: supervision     Goal #2 Patient is able to demonstrate transfers Sit to/from Stand at assistance level: supervision     Goal #3 Patient is able to ambulate 150 feet with assist device: walker - rolling at assistance level: supervision     Goal #4 Asc/desc 1 flight of stairs with 1 flight of stairs     Goal #5 Pt will be ind/mod I in HEP for B LE while seated/supine     Goal #6    Goal Comments: Goals established on 5/18/2024      PHYSICAL THERAPY MEDICAL/SOCIAL HISTORY  History related to current admission: Patient is a 59 year old male admitted on 5/14/2024 from home for fall.   Pt diagnosed with sepsis with hx of bladder CA s/p resection.and creation of neobladder      HOME SITUATION  Type of Home: House   Home Layout: Two level  Stairs to Enter : 2     Stairs to Bedroom: 14       Lives With: Spouse     Patient Owned Equipment:  (tri walker and scooter)       Prior Level of [place]: Prior to admission, patient's baseline is mod I in his mobilities and is amb with a tri-wheeled walker and uses a scooter in longer distances.     SUBJECTIVE  I am dizzy      OBJECTIVE     Fall Risk: High fall risk    WEIGHT BEARING RESTRICTION  Weight Bearing Restriction: None                PAIN ASSESSMENT             COGNITION  Overall Cognitive Status:  WFL - within functional limits    RANGE OF MOTION AND STRENGTH ASSESSMENT  Upper extremity ROM and strength are within functional limits     Lower extremity ROM is within functional limits     Lower extremity strength is within functional limits       BALANCE  Static Sitting: Good  Dynamic Sitting: Good  Static Standing: Fair  Dynamic Standing: Fair -    ADDITIONAL TESTS           ACTIVITY TOLERANCE: poor due to dizziness      O2 WALK       NEUROLOGICAL FINDINGS       '6-Clicks' INPATIENT SHORT FORM - BASIC MOBILITY  How much difficulty does the patient currently have...  Patient Difficulty: Turning over in bed (including adjusting bedclothes, sheets and blankets)?: A Little   Patient Difficulty: Sitting down on and standing up from a chair with arms (e.g., wheelchair, bedside commode, etc.): A Little   Patient Difficulty: Moving from lying on back to sitting on the side of the bed?: A Little   How much help from another person does the patient currently need...   Help from Another: Moving to and from a bed to a chair (including a wheelchair)?: A Little   Help from Another: Need to walk in hospital room?: Total   Help from Another: Climbing 3-5 steps with a railing?: Total       AM-PAC Score:  Raw Score: 14   Approx Degree of Impairment: 61.29%    Standardized Score (AM-PAC Scale): 38.1   CMS Modifier (G-Code): CL    FUNCTIONAL ABILITY STATUS  Gait Assessment   Functional Mobility/Gait Assessment  Gait Assistance: Moderate assistance  Distance (ft): 1  Assistive Device: Rolling walker  Pattern: Shuffle    Skilled Therapy Provided     Bed Mobility:  Rolling: min A  Supine to sit: min A   Sit to supine: NT     Transfer Mobility:  Sit to stand: min A   Stand to sit: min A  Gait = Unable to ambulate due to c/o dizziness    Therapist's Comments:   Dizziness when up but BP is stable  Extra time for task completion due to dizziness  Left on the recliner and made comfortable with nursing aware  Encourage to be up at tolerance    Exercise/Education Provided:  Bed mobility  Body mechanics  Functional activity tolerated  Gait training    Patient End of Session: Up in chair;Needs met;Call light within reach;RN aware of session/findings;All patient questions and concerns addressed;Alarm set;Family present      Patient Evaluation Complexity Level:  History Moderate - 1 or 2 personal factors and/or co-morbidities   Examination of body systems Low - addressing 1-2 elements   Clinical Presentation Moderate - Evolving   Clinical Decision Making Low - Stable       PT Session Time: 30 minutes  Gait Trainin minutes  Therapeutic Activity: 15 minutes    Therapeutic Exercise: 5 minutes

## 2024-05-18 NOTE — TRANSFER CENTER NOTE
Received call from Gerri (pt's RN) for request to [place], from Dr. Nur. Patient had Neobladder reconstruction at [facility] in March. Request for continuity of care. Spoke with [facility] transfer center, currently there are no beds available in the ICU. Patient not higher level of need, patient not placed on wait list. Call back Monday if higher level is needed.

## 2024-05-18 NOTE — PROGRESS NOTES
[facility]   part of [facility] Infectious Disease  Progress Note    Yunior Garrett Patient Status:  Inpatient    1965 MRN [id number]   [facility] 4SW-A Attending Adrienne Chahal DO   Hosp Day # 4 PCP Antione Valadez DO     Yunior Garrett is a 59 year old male.   Chief Complaint   Patient presents with    Difficulty Breathing    Fall    Fatigue       HPI:      Intermittent temps; able to hold down some food               REVIEW OF SYSTEMS:   A comprehensive 10 point review of systems was completed.  Pertinent positives and negatives noted in the the HPI.       Allergies:  No Known Allergies     Current Meds:    Current Facility-Administered Medications:     Vancomycin: PHARMACY DOSING, 1 each, Intravenous, See Admin Instructions (RX holding)    diphenhydrAMINE (Benadryl) 50 mg/mL  injection 25 mg, 25 mg, Intravenous, Q6H PRN    LORazepam (Ativan) 2 mg/mL injection 0.5 mg, 0.5 mg, Intravenous, BID PRN    insulin aspart (NovoLOG) 100 Units/mL FlexPen 2-10 Units, 2-10 Units, Subcutaneous, TID CC and HS    HYDROcodone-acetaminophen (Norco) 5-325 MG per tab 1 tablet, 1 tablet, Oral, Q6H PRN    pantoprazole (Protonix) 40 mg in sodium chloride 0.9% PF 10 mL IV push, 40 mg, Intravenous, 2 times per day    acetaminophen (Tylenol) tab 650 mg, 650 mg, Oral, Q6H PRN    glucose (Dex4) 15 GM/59ML oral liquid 15 g, 15 g, Oral, Q15 Min PRN **OR** glucose (Glutose) 40% oral gel 15 g, 15 g, Oral, Q15 Min PRN **OR** glucose-vitamin C (Dex-4) chewable tab 4 tablet, 4 tablet, Oral, Q15 Min PRN **OR** dextrose 50% injection 50 mL, 50 mL, Intravenous, Q15 Min PRN **OR** glucose (Dex4) 15 GM/59ML oral liquid 30 g, 30 g, Oral, Q15 Min PRN **OR** glucose (Glutose) 40% oral gel 30 g, 30 g, Oral, Q15 Min PRN **OR** glucose-vitamin C (Dex-4) chewable tab 8 tablet, 8 tablet, Oral, Q15 Min PRN    enoxaparin (Lovenox) 40 MG/0.4ML SUBQ injection 40 mg, 40 mg, Subcutaneous, Daily     ondansetron (Zofran) 4 MG/2ML injection 4 mg, 4 mg, Intravenous, Q6H PRN    prochlorperazine (Compazine) 10 MG/2ML injection 5 mg, 5 mg, Intravenous, Q8H PRN    polyethylene glycol (PEG 3350) (Miralax) 17 g oral packet 17 g, 17 g, Oral, Daily PRN    sennosides (Senokot) tab 17.2 mg, 17.2 mg, Oral, Nightly PRN    bisacodyl (Dulcolax) 10 MG rectal suppository 10 mg, 10 mg, Rectal, Daily PRN    fleet enema (Fleet) 7-19 GM/118ML rectal enema 133 mL, 1 enema, Rectal, Once PRN    melatonin tab 3 mg, 3 mg, Oral, Nightly PRN   No current outpatient medications on file.        HISTORY:  Past Medical History:    Back problem    Benign essential HTN    Calculus of kidney    Cancer (HCC)    Melanoma    Diabetes (HCC)    High blood pressure    High cholesterol    Mixed hyperlipidemia    Osteoarthritis    Problems with swallowing    Renal disorder    CKD Stage2    Sarcoidosis    Sarcoidosis of lung (HCC)    Shortness of breath    Sleep apnea    Pt use CPAP at night    Type 2 diabetes mellitus without complication, without long-term current use of insulin (HCC)      Past Surgical History:   Procedure Laterality Date    Cath bare metal stent (bms)  10/2019    Colonoscopy N/A 9/29/2020    Procedure: COLONOSCOPY;  Surgeon: David Jo MD;  Location:  ENDOSCOPY    Other  07/2019    Left elbow surgery    Other surgical history  06/22/2022    Osteophytectomy cervical 2, cervical 3, cervical 4, cervical 5         Social history and family history negative related to present illness except as above.    PHYSICAL EXAM:   /89 (BP Location: Left arm)   Pulse 94   Temp 98.6 °F (37 °C) (Temporal)   Resp 19   Ht 5' 5\" (1.651 m)   Wt 170 lb 6.7 oz (77.3 kg)   SpO2 99%   BMI 28.36 kg/m²   GENERAL:  Awake, alert, oriented x3. Non-tox, non-septic and in NAD.  HEENT:  Normocephalic, no scleral abnormalities.  Oropharynx clear, trachea ML.  NECK:  Supple, no masses, no lymphadenopathy.  LUNGS:  Clear to auscultation  b/l, no rhonchi, rales, or wheezes.  CARDIO: RRR S1/S2, no rubs, clicks, heaves, or murmurs.  GI:  ruq firmer distended and tender  EXTREMITIES:  No edema, no clubbing, no cyanosis.  NEURO:  No focal neurologic deficits.  DERM:  Warm, dry, no rashes.    IMPRESSION/PLAN:        Septic shock with mrsa bacteremia rx vanc; ct had bilateral hydro and now has cardona in his neobladder; most likely this is source; new temp so repeat study with ultrasound to check on hydro and as ruq tender check on g.b.; ultrasound noted persistent hydro  Carbuncle drained 5/16 too  Active urine sediment most likely from above; but will leave on cefepime till repeat blood and urine culture available  Multiple pulm nodules but with hx sarcoid; suggest getting us ct from [place] or taking our ct to [place] so they cna compare if any difference  No evidence endocarditis or seeded spine but these remain in ddx  Spoke with pt wife rn dr machuca.   Remains critically ill >35 min  For repeat ct today  Agree with [facility] transfer too            Recent Results (from the past 72 hour(s))   POCT Glucose    Collection Time: 05/15/24 12:53 PM   Result Value Ref Range    POC Glucose 358 (H) 70 - 99 mg/dL   Potassium    Collection Time: 05/15/24  2:34 PM   Result Value Ref Range    Potassium 2.8 (LL) 3.5 - 5.1 mmol/L   Blood Culture    Collection Time: 05/15/24  2:34 PM    Specimen: Blood,peripheral   Result Value Ref Range    Blood Culture Result No Growth 2 Days    Magnesium    Collection Time: 05/15/24  2:34 PM   Result Value Ref Range    Magnesium 1.9 1.6 - 2.6 mg/dL   Blood Culture    Collection Time: 05/15/24  2:42 PM    Specimen: Bld,Central line; Blood   Result Value Ref Range    Blood Culture Result No Growth 2 Days    POCT Glucose    Collection Time: 05/15/24  4:06 PM   Result Value Ref Range    POC Glucose 272 (H) 70 - 99 mg/dL   Basic Metabolic Panel (8)    Collection Time: 05/15/24  6:55 PM   Result Value Ref Range    Glucose 239 (H) 70 - 99 mg/dL    Sodium  139 136 - 145 mmol/L    Potassium 3.1 (L) 3.5 - 5.1 mmol/L    Chloride 108 98 - 112 mmol/L    CO2 25.0 21.0 - 32.0 mmol/L    Anion Gap 6 0 - 18 mmol/L    BUN 20 9 - 23 mg/dL    Creatinine 1.23 0.70 - 1.30 mg/dL    Calcium, Total 7.7 (L) 8.5 - 10.1 mg/dL    Calculated Osmolality 298 (H) 275 - 295 mOsm/kg    eGFR-Cr 67 >=60 mL/min/1.73m2   POCT Glucose    Collection Time: 05/15/24  8:09 PM   Result Value Ref Range    POC Glucose 236 (H) 70 - 99 mg/dL   Potassium    Collection Time: 05/16/24  1:42 AM   Result Value Ref Range    Potassium 3.0 (L) 3.5 - 5.1 mmol/L   Basic Metabolic Panel (8)    Collection Time: 05/16/24  5:24 AM   Result Value Ref Range    Glucose 124 (H) 70 - 99 mg/dL    Sodium 141 136 - 145 mmol/L    Potassium 3.2 (L) 3.5 - 5.1 mmol/L    Chloride 107 98 - 112 mmol/L    CO2 32.0 21.0 - 32.0 mmol/L    Anion Gap 2 0 - 18 mmol/L    BUN 18 9 - 23 mg/dL    Creatinine 0.94 0.70 - 1.30 mg/dL    Calcium, Total 7.6 (L) 8.5 - 10.1 mg/dL    Calculated Osmolality 295 275 - 295 mOsm/kg    eGFR-Cr 92 >=60 mL/min/1.73m2   CBC W/ DIFFERENTIAL    Collection Time: 05/16/24  5:24 AM   Result Value Ref Range    WBC 6.4 4.0 - 11.0 x10(3) uL    RBC 2.68 (L) 4.30 - 5.70 x10(6)uL    HGB 7.5 (L) 13.0 - 17.5 g/dL    HCT 22.5 (L) 39.0 - 53.0 %    .0 (L) 150.0 - 450.0 10(3)uL    MCV 84.0 80.0 - 100.0 fL    MCH 28.0 26.0 - 34.0 pg    MCHC 33.3 31.0 - 37.0 g/dL    RDW 13.6 %    Neutrophil Absolute Prelim 5.47 1.50 - 7.70 x10 (3) uL    Neutrophil Absolute 5.47 1.50 - 7.70 x10(3) uL    Lymphocyte Absolute 0.39 (L) 1.00 - 4.00 x10(3) uL    Monocyte Absolute 0.48 0.10 - 1.00 x10(3) uL    Eosinophil Absolute 0.08 0.00 - 0.70 x10(3) uL    Basophil Absolute 0.00 0.00 - 0.20 x10(3) uL    Immature Granulocyte Absolute 0.02 0.00 - 1.00 x10(3) uL    Neutrophil % 84.9 %    Lymphocyte % 6.1 %    Monocyte % 7.5 %    Eosinophil % 1.2 %    Basophil % 0.0 %    Immature Granulocyte % 0.3 %   POCT Glucose    Collection Time: 05/16/24  5:41 AM    Result Value Ref Range    POC Glucose 123 (H) 70 - 99 mg/dL   Potassium    Collection Time: 05/16/24 10:26 AM   Result Value Ref Range    Potassium 3.4 (L) 3.5 - 5.1 mmol/L   POCT Glucose    Collection Time: 05/16/24 11:39 AM   Result Value Ref Range    POC Glucose 180 (H) 70 - 99 mg/dL   Heparin Induced Platelet    Collection Time: 05/16/24 12:27 PM   Result Value Ref Range    Heparin Induced Plt Antibody Negative Negative   Urinalysis, Routine    Collection Time: 05/16/24  1:25 PM   Result Value Ref Range    Urine Color Colorless (A) Yellow    Clarity Urine Clear Clear    Spec Gravity 1.009 1.005 - 1.030    Glucose Urine >1000 (A) Normal mg/dL    Bilirubin Urine Negative Negative    Ketones Urine Negative Negative mg/dL    Blood Urine 1+ (A) Negative    pH Urine 6.5 5.0 - 8.0    Protein Urine 20 (A) Negative mg/dL    Urobilinogen Urine Normal Normal mg/dL    Nitrite Urine Negative Negative    Leukocyte Esterase Urine 75 (A) Negative    WBC Urine 21-50 (A) 0 - 5 /HPF    RBC Urine 3-5 (A) 0 - 2 /HPF    Bacteria Urine None Seen None Seen /HPF    Squamous Epi. Cells None Seen None Seen /HPF    Renal Tubular Epithelial Cells None Seen None Seen /HPF    Transitional Cells None Seen None Seen /HPF    Yeast Urine None Seen None Seen /HPF   Urine Culture, Routine    Collection Time: 05/16/24  1:25 PM    Specimen: Urine, cardona catheter   Result Value Ref Range    Urine Culture No Growth at 18-24 hrs.    CBC W/ DIFFERENTIAL    Collection Time: 05/16/24  4:18 PM   Result Value Ref Range    WBC 7.1 4.0 - 11.0 x10(3) uL    RBC 2.91 (L) 4.30 - 5.70 x10(6)uL    HGB 8.3 (L) 13.0 - 17.5 g/dL    HCT 24.3 (L) 39.0 - 53.0 %    .0 (L) 150.0 - 450.0 10(3)uL    MCV 83.5 80.0 - 100.0 fL    MCH 28.5 26.0 - 34.0 pg    MCHC 34.2 31.0 - 37.0 g/dL    RDW 13.9 %    Neutrophil Absolute Prelim 6.05 1.50 - 7.70 x10 (3) uL    Neutrophil Absolute 6.05 1.50 - 7.70 x10(3) uL    Lymphocyte Absolute 0.43 (L) 1.00 - 4.00 x10(3) uL    Monocyte  Absolute 0.48 0.10 - 1.00 x10(3) uL    Eosinophil Absolute 0.07 0.00 - 0.70 x10(3) uL    Basophil Absolute 0.01 0.00 - 0.20 x10(3) uL    Immature Granulocyte Absolute 0.03 0.00 - 1.00 x10(3) uL    Neutrophil % 85.6 %    Lymphocyte % 6.1 %    Monocyte % 6.8 %    Eosinophil % 1.0 %    Basophil % 0.1 %    Immature Granulocyte % 0.4 %   Aerobic Bacterial Culture    Collection Time: 05/16/24  5:29 PM    Specimen: perineal; Other   Result Value Ref Range    Aerobic Culture Result 2+ growth Staphylococcus aureus (A)     Aerobic Smear 3+ WBCs seen     Aerobic Smear 2+ Gram Positive Cocci    POCT Glucose    Collection Time: 05/16/24  5:32 PM   Result Value Ref Range    POC Glucose 124 (H) 70 - 99 mg/dL   Potassium    Collection Time: 05/16/24  9:18 PM   Result Value Ref Range    Potassium 4.2 3.5 - 5.1 mmol/L   Blood Culture    Collection Time: 05/16/24  9:18 PM    Specimen: Blood,peripheral   Result Value Ref Range    Blood Culture Result No Growth 1 Day    Blood Culture    Collection Time: 05/16/24  9:18 PM    Specimen: Blood,peripheral   Result Value Ref Range    Blood Culture Result No Growth 1 Day    POCT Glucose    Collection Time: 05/16/24  9:19 PM   Result Value Ref Range    POC Glucose 142 (H) 70 - 99 mg/dL   Comp Metabolic Panel (14)    Collection Time: 05/17/24  4:53 AM   Result Value Ref Range    Glucose 161 (H) 70 - 99 mg/dL    Sodium 139 136 - 145 mmol/L    Potassium 4.5 3.5 - 5.1 mmol/L    Chloride 107 98 - 112 mmol/L    CO2 27.0 21.0 - 32.0 mmol/L    Anion Gap 5 0 - 18 mmol/L    BUN 18 9 - 23 mg/dL    Creatinine 1.20 0.70 - 1.30 mg/dL    Calcium, Total 7.8 (L) 8.5 - 10.1 mg/dL    Calculated Osmolality 293 275 - 295 mOsm/kg    eGFR-Cr 69 >=60 mL/min/1.73m2    AST 25 15 - 37 U/L    ALT 34 16 - 61 U/L    Alkaline Phosphatase 93 45 - 117 U/L    Bilirubin, Total 0.4 0.1 - 2.0 mg/dL    Total Protein 6.4 6.4 - 8.2 g/dL    Albumin 1.8 (L) 3.4 - 5.0 g/dL    Globulin  4.6 (H) 2.8 - 4.4 g/dL    A/G Ratio 0.4 (L) 1.0 -  2.0   CBC W/ DIFFERENTIAL    Collection Time: 05/17/24  4:53 AM   Result Value Ref Range    WBC 8.3 4.0 - 11.0 x10(3) uL    RBC 2.80 (L) 4.30 - 5.70 x10(6)uL    HGB 7.9 (L) 13.0 - 17.5 g/dL    HCT 23.6 (L) 39.0 - 53.0 %    .0 (L) 150.0 - 450.0 10(3)uL    MCV 84.3 80.0 - 100.0 fL    MCH 28.2 26.0 - 34.0 pg    MCHC 33.5 31.0 - 37.0 g/dL    RDW 13.7 %    Neutrophil Absolute Prelim 7.04 1.50 - 7.70 x10 (3) uL    Neutrophil Absolute 7.04 1.50 - 7.70 x10(3) uL    Lymphocyte Absolute 0.45 (L) 1.00 - 4.00 x10(3) uL    Monocyte Absolute 0.73 0.10 - 1.00 x10(3) uL    Eosinophil Absolute 0.04 0.00 - 0.70 x10(3) uL    Basophil Absolute 0.01 0.00 - 0.20 x10(3) uL    Immature Granulocyte Absolute 0.04 0.00 - 1.00 x10(3) uL    Neutrophil % 84.7 %    Lymphocyte % 5.4 %    Monocyte % 8.8 %    Eosinophil % 0.5 %    Basophil % 0.1 %    Immature Granulocyte % 0.5 %   ABORH Confirmation    Collection Time: 05/17/24  4:53 AM   Result Value Ref Range    ABO BLOOD TYPE B     RH BLOOD TYPE Positive    POCT Glucose    Collection Time: 05/17/24  7:57 AM   Result Value Ref Range    POC Glucose 159 (H) 70 - 99 mg/dL   POCT Glucose    Collection Time: 05/17/24 11:23 AM   Result Value Ref Range    POC Glucose 157 (H) 70 - 99 mg/dL   POCT Glucose    Collection Time: 05/17/24  3:33 PM   Result Value Ref Range    POC Glucose 142 (H) 70 - 99 mg/dL   POCT Glucose    Collection Time: 05/17/24  5:20 PM   Result Value Ref Range    POC Glucose 141 (H) 70 - 99 mg/dL   POCT Glucose    Collection Time: 05/17/24  8:46 PM   Result Value Ref Range    POC Glucose 190 (H) 70 - 99 mg/dL   Vancomycin Trough, Serum    Collection Time: 05/18/24  2:27 AM   Result Value Ref Range    Vancomycin Trough 25.2 (H) 10.0 - 20.0 ug/mL   Comp Metabolic Panel (14)    Collection Time: 05/18/24  2:27 AM   Result Value Ref Range    Glucose 172 (H) 70 - 99 mg/dL    Sodium 133 (L) 136 - 145 mmol/L    Potassium 4.1 3.5 - 5.1 mmol/L    Chloride 102 98 - 112 mmol/L    CO2 26.0  21.0 - 32.0 mmol/L    Anion Gap 5 0 - 18 mmol/L    BUN 22 9 - 23 mg/dL    Creatinine 1.64 (H) 0.70 - 1.30 mg/dL    Calcium, Total 7.8 (L) 8.5 - 10.1 mg/dL    Calculated Osmolality 283 275 - 295 mOsm/kg    eGFR-Cr 47 (L) >=60 mL/min/1.73m2    AST 31 15 - 37 U/L    ALT 34 16 - 61 U/L    Alkaline Phosphatase 93 45 - 117 U/L    Bilirubin, Total 0.3 0.1 - 2.0 mg/dL    Total Protein 6.3 (L) 6.4 - 8.2 g/dL    Albumin 1.6 (L) 3.4 - 5.0 g/dL    Globulin  4.7 (H) 2.8 - 4.4 g/dL    A/G Ratio 0.3 (L) 1.0 - 2.0   CBC W/ DIFFERENTIAL    Collection Time: 05/18/24  2:27 AM   Result Value Ref Range    WBC 7.5 4.0 - 11.0 x10(3) uL    RBC 2.46 (L) 4.30 - 5.70 x10(6)uL    HGB 6.7 (LL) 13.0 - 17.5 g/dL    HCT 21.1 (L) 39.0 - 53.0 %    .0 (L) 150.0 - 450.0 10(3)uL    MCV 85.8 80.0 - 100.0 fL    MCH 27.2 26.0 - 34.0 pg    MCHC 31.8 31.0 - 37.0 g/dL    RDW 13.6 %    Neutrophil Absolute Prelim 5.94 1.50 - 7.70 x10 (3) uL    Neutrophil Absolute 5.94 1.50 - 7.70 x10(3) uL    Lymphocyte Absolute 0.51 (L) 1.00 - 4.00 x10(3) uL    Monocyte Absolute 0.82 0.10 - 1.00 x10(3) uL    Eosinophil Absolute 0.14 0.00 - 0.70 x10(3) uL    Basophil Absolute 0.01 0.00 - 0.20 x10(3) uL    Immature Granulocyte Absolute 0.05 0.00 - 1.00 x10(3) uL    Neutrophil % 79.5 %    Lymphocyte % 6.8 %    Monocyte % 11.0 %    Eosinophil % 1.9 %    Basophil % 0.1 %    Immature Granulocyte % 0.7 %   ABORH (Blood Type)    Collection Time: 05/18/24  3:29 AM   Result Value Ref Range    ABO BLOOD TYPE B     RH BLOOD TYPE Positive    Antibody Screen    Collection Time: 05/18/24  3:29 AM   Result Value Ref Range    Antibody Screen Negative    Prepare RBC Once    Collection Time: 05/18/24  4:46 AM   Result Value Ref Range    Blood Product [id number]     Unit Number [id number]     UNIT ABO B     UNIT RH POS     Product Status Issued     Expiration Date 556144501496     Blood Type Barcode 7300     Unit Volume 350 ml   POCT Glucose    Collection Time: 05/18/24  9:14 AM    Result Value Ref Range    POC Glucose 154 (H) 70 - 99 mg/dL   Hemoglobin    Collection Time: 05/18/24  9:22 AM   Result Value Ref Range    HGB 8.1 (L) 13.0 - 17.5 g/dL         Navi Chow MD     CALL [facility] AT [phone] IF QUESTIONS OR CONCERNS  THANKS

## 2024-05-18 NOTE — PLAN OF CARE
Received patient after report. Patient resting in bed with cpap on. Removed, to room air. C/o abdominal pain. See chart. Abdominal US completed, see results. CT abd/pelv results paged to urology, hospitalist, and critical care, see results. Patient describes new onset of blurred vision. Full neuro exam otherwise negative, BP WNL. Denies any other symptoms. MD aware. CTH completed, see results. Rodriguez in place. Irrigated. Up to chair and commode. Patient interested in transferring to [facility], where he received his recent surgery in March. Transfer center notified. Able to transfer out of ICU, awaiting available bed. Wife at bedside. Updated on plan of care.

## 2024-05-18 NOTE — PROGRESS NOTES
[facility] Hospitalist Progress Note     CC: Hospital Follow up    PCP: Antione Valadez DO       Subjective:     Seen and examined in the ICU.  He is more awake, alert, interactive.  Complains of pain in his right suprapubic region.  Wife at bedside.  He is worried about his hemoglobin being low.  He has received transfusions for his previous surgeries    OBJECTIVE:    Blood pressure 137/89, pulse 94, temperature 98.6 °F (37 °C), temperature source Temporal, resp. rate 19, height 5' 5\" (1.651 m), weight 170 lb 6.7 oz (77.3 kg), SpO2 99%.    Temp:  [97.8 °F (36.6 °C)-101.7 °F (38.7 °C)] 98.6 °F (37 °C)  Pulse:  [] 94  Resp:  [16-29] 19  BP: (103-137)/(56-89) 137/89  SpO2:  [90 %-99 %] 99 %      Intake/Output:    Intake/Output Summary (Last 24 hours) at 5/18/2024 1408  Last data filed at 5/18/2024 0805  Gross per 24 hour   Intake 914 ml   Output 2850 ml   Net -1936 ml       Last 3 Weights   05/18/24 0400 170 lb 6.7 oz (77.3 kg)   05/17/24 0400 171 lb 11.8 oz (77.9 kg)   05/15/24 0500 166 lb 3.6 oz (75.4 kg)   05/14/24 1149 160 lb 0.9 oz (72.6 kg)   05/14/24 1110 160 lb 0.9 oz (72.6 kg)   05/14/24 0454 174 lb (78.9 kg)   06/22/22 0645 170 lb (77.1 kg)   06/21/22 0831 169 lb (76.7 kg)   04/05/22 1450 175 lb (79.4 kg)       Exam   Gen: No acute distress, alert and oriented x3, no focal neurologic deficits, appears tired, central line in place  Heent: NC AT, mucous memb clear, neck supple  Pulm: Limited exam, diminished, supplemental oxygen,  CV: Heart with regular rate and rhythm, no peripheral edema  Abd: Abdomen soft, vaguely tender throughout the abdomen, nondistended, bowel sounds present  MSK: Moving all extremities spontaneously, gait not assessed  Skin: Carbuncle noted on the right suprapubic region that is very tender to touch  Neuro: AO*3, motor intact, no sensory deficits  Psyc: appropriate mood and affect      Data Review:       Labs:     Recent Labs   Lab 05/16/24  1618 05/17/24  0453 05/18/24  8843  05/18/24  0922   RBC 2.91* 2.80* 2.46*  --    HGB 8.3* 7.9* 6.7* 8.1*   HCT 24.3* 23.6* 21.1*  --    MCV 83.5 84.3 85.8  --    MCH 28.5 28.2 27.2  --    MCHC 34.2 33.5 31.8  --    RDW 13.9 13.7 13.6  --    NEPRELIM 6.05 7.04 5.94  --    WBC 7.1 8.3 7.5  --    .0* 128.0* 131.0*  --          Recent Labs   Lab 05/16/24  0524 05/16/24  1026 05/16/24  2118 05/17/24 0453 05/18/24 0227   *  --   --  161* 172*   BUN 18  --   --  18 22   CREATSERUM 0.94  --   --  1.20 1.64*   EGFRCR 92  --   --  69 47*   CA 7.6*  --   --  7.8* 7.8*     --   --  139 133*   K 3.2*   < > 4.2 4.5 4.1     --   --  107 102   CO2 32.0  --   --  27.0 26.0    < > = values in this interval not displayed.       Recent Labs   Lab 05/14/24  0524 05/14/24  1354 05/15/24  0353 05/17/24 0453 05/18/24 0227   ALT 40 38 34 34 34   AST 18 20 19 25 31   ALB 2.5* 2.2* 1.9* 1.8* 1.6*         Imaging:  CT ABDOMEN+PELVIS(CPT=74176)    Result Date: 5/18/2024  CONCLUSION:  1. There is 9.9 cm right perinephric hematoma with slight extension of hemorrhage into the right retroperitoneal space.  This finding was reported to KELECHI Talley on May 18, 2024 at 2:00 p.m.. 2. There is postoperative change associated with the bladder which could represent surgical augmentation of bladder or neobladder creation.  Correlation with specific surgical history is necessary.  There is a Rodriguez catheter within the surgically altered  bladder. 3. There is mild bilateral hydronephrosis.  There is also air in the right renal collecting system probably related to the prior surgery. 4. There are small bilateral pleural effusions.   LOCATION:  [facility]   Dictated by (CST): Scottie Allred MD on 5/18/2024 at 1:55 PM     Finalized by (CST): Scottie Allred MD on 5/18/2024 at 2:04 PM       CT BRAIN OR HEAD (52901)    Result Date: 5/18/2024  CONCLUSION: 1. No acute intracranial findings 2. Chronic microvascular ischemic changes    LOCATION:  [facility]   Dictated by (CST):  Quintin Rossi MD on 5/18/2024 at 1:59 PM     Finalized by (CST): Quintin Rossi MD on 5/18/2024 at 2:00 PM       US ABDOMEN COMPLETE (CPT=76700)    Result Date: 5/18/2024  CONCLUSION:  1. Mild to moderate bilateral hydronephrosis is noted similar to prior CT scan. 2. There is echogenic debris within gallbladder lumen consistent with gallbladder sludge.  There is otherwise no specific evidence of cholecystitis. 3. Complicated lesions in both kidneys described on CT are not identified on ultrasound.  Follow-up dedicated three-phase CT or MRI of kidneys should be considered.   LOCATION:  [facility]    Dictated by (CST): Scottie Allred MD on 5/18/2024 at 8:43 AM     Finalized by (CST): Scottie Allred MD on 5/18/2024 at 8:46 AM          Meds:      Vancomycin IV  1 each Intravenous See Admin Instructions (RX holding)    cefepime  1 g Intravenous Q12H    insulin aspart  2-10 Units Subcutaneous TID CC and HS    pantoprazole  40 mg Intravenous 2 times per day    enoxaparin  40 mg Subcutaneous Daily           diphenhydrAMINE    LORazepam    HYDROcodone-acetaminophen    acetaminophen    glucose **OR** glucose **OR** glucose-vitamin C **OR** dextrose **OR** glucose **OR** glucose **OR** glucose-vitamin C    ondansetron    prochlorperazine    polyethylene glycol (PEG 3350)    sennosides    bisacodyl    fleet enema    melatonin       Assessment/Plan:     Principal Problem:    Sepsis, due to unspecified organism, unspecified whether acute organ dysfunction present (HCC)  Active Problems:    Anemia    Azotemia    Leukocytosis    Hyperglycemia    Metabolic acidosis    Patient is a 59 year old male with PMH sig for bladder cancer status post neobladder creation in March, nonischemic cardiomyopathy chronic low back pain status post spinal stimulator, pulmonary sarcoidosis who presents for evaluation of 1 week of generalized weakness, some shortness of breath, cough.  Patient managed for septic shock secondary to MRSA bacteremia.      Septic shock- resolved  MRSA bacteremia - ?etiology, carbuncle or given recent surgery for neobladder at [facility]  - IV pressors, wean as tolerated  - can dc IVF  - Lactate within normal range  - 2D echo-no evidence of endocarditis  - Empiric cefepime and azithromycin, added vancomycin - per ID   - Blood cultures 2 out of 2 positive for MRSA, urine cultures neg, repeat blood cultures- NGTD  - ID on consult, recs reviewed  - ICU management - stable for transfer  - abdominal pain dizziness, abdominal US pending r/o GB etiology - no biliary source  - repeat CT A/P - d/w ID, urology, CC given ongoing fevers  - given R sided abd pain, hydronephrosis is the only possible etiology so far, could consider evaluation by his urologist at [facility], will see if they'll accept      UTI  - Abnormal UA, cx neg  - CT evidence of hydronephrosis, bilateral  - In the setting of recent neobladder creation  - Continue IV cefepime, vanc per ID   - Urology on consult, appreciate input  - Maintain Rodriguez     Acute on chronic anemia  - No gross bleeding, melena reported  - Given critically ill status, possible PUD versus stress ulcers remain in the differential-other supporting symptoms include nausea, poor appetite, vague abdominal pain  - Repeat H&H in the late afternoon  - Empiric PPI twice daily, can DC if hemoglobin remains stable     Carbuncle, R suprapubic region  - s/p bedside I&D by urology  - cx sent, await results    ELENA- resolved  - In the setting of UTI and sepsis  - Follow BMP     History of bladder cancer  - Status post recent neobladder creation at [facility] in March 2024  - Follows with [facility] urologist     History of pulmonary sarcoidosis  - Multiple nodules on CT chest, unclear if infection versus sarcoid related  - Plan to treat with IV antibiotics for now, may need outpatient monitoring with imaging  - Has chronic shortness of breath, currently bacteremic no indication for steroids at this time     Nonischemic cardiomyopathy  - Echo  without any vegetation, normal EF  - Can resume aspirin and statin once out of the ICU     Hypertension  - Hold antihypertensives until BP improves     Hyperlipidemia  - Hold statin for now     Acidosis, nongap - resolving   - Presenting bicarb 10, in the setting of sepsis     Type 2 diabetes with hyperglycemia  - Hold home insulin  - Accu-Cheks, increased to medium dose ISS, may need long-acting coverage when appetite improves     FN:  - IVF: per ICU   - Diet: Diabetic     DVT Prophy: SCDs, heparin subcu  Atrophy: Ambulate as tolerated  Lines: PIV     Dispo: [facility] for transfer   Outpatient records or previous hospital records reviewed.      Further recommendations pending patient's clinical course.  [facility] hospitalist to continue to follow patient while in house     Patient and/or patient's family given opportunity to ask questions and note understanding and agreeing with therapeutic plan as outlined     Thank You,    Edmundo Nur MD    [facility]  Answering Service number: [phone]

## 2024-05-18 NOTE — PLAN OF CARE
Received pt alert and oriented x4, REGALADO, Room air while awake and cpap at night, Pt spiked temp 101.7 notified ID no new orders received. NPO after midnight for US of the abd this am. Denies pain. HS care provided for. Irrigated cardona per order without issue. Wife at bedside. Explained POC     Lab called with critical hgb 6.7, cardona with yellow ouput, abd soft,  updated APN, orders received for transfusion, consent obtained, iv started.  During am care pt noted cardona leaking, irrigated cardona x2 at 0500 without issue, pt dangled and sat on commode for a total of 20 mins. PRN Norco given for pain.

## 2024-05-19 LAB
ALBUMIN SERPL-MCNC: 1.7 G/DL (ref 3.4–5)
ALBUMIN/GLOB SERPL: 0.3 {RATIO} (ref 1–2)
ALP LIVER SERPL-CCNC: 107 U/L
ALT SERPL-CCNC: 35 U/L
ANION GAP SERPL CALC-SCNC: 6 MMOL/L (ref 0–18)
AST SERPL-CCNC: 31 U/L (ref 15–37)
BASOPHILS # BLD AUTO: 0.01 X10(3) UL (ref 0–0.2)
BASOPHILS NFR BLD AUTO: 0.1 %
BILIRUB SERPL-MCNC: 0.4 MG/DL (ref 0.1–2)
BLOOD TYPE BARCODE: 7300
BUN BLD-MCNC: 19 MG/DL (ref 9–23)
CALCIUM BLD-MCNC: 8.2 MG/DL (ref 8.5–10.1)
CHLORIDE SERPL-SCNC: 101 MMOL/L (ref 98–112)
CO2 SERPL-SCNC: 27 MMOL/L (ref 21–32)
CREAT BLD-MCNC: 1.43 MG/DL
EGFRCR SERPLBLD CKD-EPI 2021: 56 ML/MIN/1.73M2 (ref 60–?)
EOSINOPHIL # BLD AUTO: 0.17 X10(3) UL (ref 0–0.7)
EOSINOPHIL NFR BLD AUTO: 2.1 %
ERYTHROCYTE [DISTWIDTH] IN BLOOD BY AUTOMATED COUNT: 17.1 %
GLOBULIN PLAS-MCNC: 5 G/DL (ref 2.8–4.4)
GLUCOSE BLD-MCNC: 130 MG/DL (ref 70–99)
GLUCOSE BLD-MCNC: 131 MG/DL (ref 70–99)
GLUCOSE BLD-MCNC: 181 MG/DL (ref 70–99)
GLUCOSE BLD-MCNC: 216 MG/DL (ref 70–99)
GLUCOSE BLD-MCNC: 235 MG/DL (ref 70–99)
HCT VFR BLD AUTO: 20.8 %
HCT VFR BLD AUTO: 22.9 %
HCT VFR BLD AUTO: 23.9 %
HCT VFR BLD AUTO: 24.5 %
HGB BLD-MCNC: 7 G/DL
HGB BLD-MCNC: 7.4 G/DL
HGB BLD-MCNC: 7.5 G/DL
HGB BLD-MCNC: 7.7 G/DL
IMM GRANULOCYTES # BLD AUTO: 0.08 X10(3) UL (ref 0–1)
IMM GRANULOCYTES NFR BLD: 1 %
LYMPHOCYTES # BLD AUTO: 0.57 X10(3) UL (ref 1–4)
LYMPHOCYTES NFR BLD AUTO: 6.9 %
MCH RBC QN AUTO: 26 PG (ref 26–34)
MCHC RBC AUTO-ENTMCNC: 31.4 G/DL (ref 31–37)
MCV RBC AUTO: 83 FL
MONOCYTES # BLD AUTO: 0.59 X10(3) UL (ref 0.1–1)
MONOCYTES NFR BLD AUTO: 7.2 %
NEUTROPHILS # BLD AUTO: 6.83 X10 (3) UL (ref 1.5–7.7)
NEUTROPHILS # BLD AUTO: 6.83 X10(3) UL (ref 1.5–7.7)
NEUTROPHILS NFR BLD AUTO: 82.7 %
OSMOLALITY SERPL CALC.SUM OF ELEC: 282 MOSM/KG (ref 275–295)
PLATELET # BLD AUTO: 174 10(3)UL (ref 150–450)
POTASSIUM SERPL-SCNC: 3.7 MMOL/L (ref 3.5–5.1)
PROT SERPL-MCNC: 6.7 G/DL (ref 6.4–8.2)
RBC # BLD AUTO: 2.88 X10(6)UL
SODIUM SERPL-SCNC: 134 MMOL/L (ref 136–145)
UNIT VOLUME: 350 ML
VANCOMYCIN SERPL-MCNC: 13.7 UG/ML (ref ?–40)
WBC # BLD AUTO: 8.3 X10(3) UL (ref 4–11)

## 2024-05-19 PROCEDURE — 99233 SBSQ HOSP IP/OBS HIGH 50: CPT | Performed by: INTERNAL MEDICINE

## 2024-05-19 RX ORDER — POTASSIUM CHLORIDE 20 MEQ/1
40 TABLET, EXTENDED RELEASE ORAL ONCE
Status: COMPLETED | OUTPATIENT
Start: 2024-05-19 | End: 2024-05-19

## 2024-05-19 RX ORDER — SODIUM CHLORIDE, SODIUM LACTATE, POTASSIUM CHLORIDE, CALCIUM CHLORIDE 600; 310; 30; 20 MG/100ML; MG/100ML; MG/100ML; MG/100ML
INJECTION, SOLUTION INTRAVENOUS CONTINUOUS
Status: DISCONTINUED | OUTPATIENT
Start: 2024-05-19 | End: 2024-05-21

## 2024-05-19 RX ORDER — VANCOMYCIN HYDROCHLORIDE
1250 ONCE
Status: COMPLETED | OUTPATIENT
Start: 2024-05-19 | End: 2024-05-19

## 2024-05-19 NOTE — PLAN OF CARE
Received pt alert and oriented, No dizziness while resting in bed. States vision remains blurry as before. Room air when awake, cpap with sleep. SR, bp stable, tolerating po, denies n/v abd remains tender but soft, trending hgb. Rodriguez irrigated and aspirated as per orders. PRN Norco given for pain.

## 2024-05-19 NOTE — PROGRESS NOTES
CRITICAL CARE            S: Pt is still having fevers. He was noted on CT Scan to have a perinephric hematoma/RP bleed. Pt continues to have abdominal pain. He also c/o shortness of breath and cough.    Meds:   Vancomycin IV  1 each Intravenous See Admin Instructions (RX holding)    cefepime  1 g Intravenous Q12H    insulin aspart  2-10 Units Subcutaneous TID CC and HS    pantoprazole  40 mg Intravenous 2 times per day    [Held by provider] enoxaparin  40 mg Subcutaneous Daily       Prn Meds:    diphenhydrAMINE    LORazepam    HYDROcodone-acetaminophen    acetaminophen    glucose **OR** glucose **OR** glucose-vitamin C **OR** dextrose **OR** glucose **OR** glucose **OR** glucose-vitamin C    ondansetron    prochlorperazine    polyethylene glycol (PEG 3350)    sennosides    bisacodyl    fleet enema    melatonin    Infusions:      OBJECTIVE:  Vitals:    05/19/24 0400 05/19/24 0500 05/19/24 0600 05/19/24 0800   BP: 116/61  118/63 125/61   Pulse: 76  79 87   Resp: 22  (!) 28 24   Temp:  99.9 °F (37.7 °C)  99.9 °F (37.7 °C)   TempSrc:  Temporal  Temporal   SpO2: 95%  96% 93%   Weight:       Height:         O2: room air    Gen - Alert, oriented x 3, in no apparent distress  Lungs - CTAB  CV - regular rate & rhythm. Normal S1, S2. No murmurs, rubs, or gallops appreciated.  Abdomen - mild diffuse tenderness to palpation  Extremities - no edema        Labs:  Recent Labs   Lab 05/17/24  0453 05/18/24  0227 05/18/24  0922 05/19/24  0105 05/19/24  0751 05/19/24  0752   WBC 8.3 7.5  --   --   --  8.3   HGB 7.9* 6.7*   < > 7.0* 7.7* 7.5*   .0* 131.0*  --   --   --  174.0    < > = values in this interval not displayed.     Recent Labs   Lab 05/14/24  0523 05/14/24  0524 05/14/24  1400 05/14/24  1856 05/15/24  1434 05/15/24  1855 05/17/24  0453 05/18/24  0227 05/19/24  0752   NA  --    < >  --    < >  --    < > 139 133* 134*   K  --    < >  --    < > 2.8*   < > 4.5 4.1 3.7   CL  --    < >  --    < >  --    < > 107 102  101   CO2  --    < >  --    < >  --    < > 27.0 26.0 27.0   BUN  --    < >  --    < >  --    < > 18 22 19   CREATSERUM  --    < >  --    < >  --    < > 1.20 1.64* 1.43*   GLU  --    < >  --    < >  --    < > 161* 172* 130*   ANIONGAP  --    < >  --    < >  --    < > 5 5 6   ALB  --    < >  --    < >  --   --  1.8* 1.6* 1.7*   CA  --    < >  --    < >  --    < > 7.8* 7.8* 8.2*   MG  --   --   --   --  1.9  --   --   --   --    ALKPHO  --    < >  --    < >  --   --  93 93 107   AST  --    < >  --    < >  --   --  25 31 31   ALT  --    < >  --    < >  --   --  34 34 35   BILT  --    < >  --    < >  --   --  0.4 0.3 0.4   TP  --    < >  --    < >  --   --  6.4 6.3* 6.7   LACTI 1.2  --  1.1  --   --   --   --   --   --     < > = values in this interval not displayed.     Recent Labs   Lab 05/14/24  0524   INR 1.16   PTT 33.0     Recent Labs   Lab 05/14/24  0524 05/14/24  0731   TROPHS 12 19         Recent Labs   Lab 05/14/24  0523   COVID19 Not Detected   INFAPCR Negative   INFBPCR Negative   RSV Negative       Imaging reviewed    ASSESSMENT AND PLAN      Septic shock - due to pneumonia, MRSA bacteremia, infected suprapubic carbuncle, and UTI. Weaned off pressors, lactate normalized. Echo was unremarkable. Still having fevers but could be from hematoma  - continue cefepime + IV vanco per ID  - follow up cultures  ELENA - with bilateral hydronephrosis on imaging   - monitor UO/lytes  - workup of hydronephrosis per urology  Abdominal pain- Ultrasound shows bilateral hydronephrosis and biliary sludge. CT 5/18 showed perinephric hematoma / RP hematoma  - analgesia  - supportive care, await urology input as to management/cause of perinephric hematoma  Bilateral Hydronephrosis - noted on CT and again on ultrasound   - Rodriguez catheter for decompression  - per urology  Bladder CA - s/p cystectomy, orthotopic neobladder creation at [facility] in March 2024    - Per urology. Normally follows at [facility]  Skin carbuncle near surgical incision  site- s/p I&D 5/16  -continue antibiotics   Pulmonary sarcoidosis - no signs of acute flare  -outpatient follow up   Cardiomyopathy, nonischemic- Echo unremarkable  -supportive care   Thrombocytopenia- likely hemodilution. HIPA negative. Improved.  - monitor plts   Nutrition  -carb controlled diet  Proph   -[facility]  Dispo  - Full code  - transfer to floor.   - care management is working on transfer to [facility] where his primary urologist is    Pulmonary / critical care will follow while the patient is in the ICU, but we will sign off once the patient has been transferred to the floor.      Geovani Pichardo M.D.  Pulmonary/Critical Care

## 2024-05-19 NOTE — PROGRESS NOTES
[facility]   part of [facility] Infectious Disease  Progress Note    Yunior Garrett Patient Status:  Inpatient    1965 MRN [id number]   [facility] 4SW-A Attending Fahad Nur*   Hosp Day # 5 PCP Antione Valadez DO     Yunior Garrett is a 59 year old male.   Chief Complaint   Patient presents with    Difficulty Breathing    Fall    Fatigue       HPI:      Ongoing discomfort               REVIEW OF SYSTEMS:   A comprehensive 10 point review of systems was completed.  Pertinent positives and negatives noted in the the HPI.       Allergies:  No Known Allergies     Current Meds:    Current Facility-Administered Medications:     lactated ringers infusion, , Intravenous, Continuous    vancomycin (Vancocin) 1.25 g in sodium chloride 0.9% 250mL IVPB premix, 1,250 mg, Intravenous, Once    Vancomycin: PHARMACY DOSING, 1 each, Intravenous, See Admin Instructions (RX holding)    ceFEPIme (Maxipime) 1 g in sodium chloride 0.9% 100 mL IVPB-MBP, 1 g, Intravenous, Q12H    diphenhydrAMINE (Benadryl) 50 mg/mL  injection 25 mg, 25 mg, Intravenous, Q6H PRN    LORazepam (Ativan) 2 mg/mL injection 0.5 mg, 0.5 mg, Intravenous, BID PRN    insulin aspart (NovoLOG) 100 Units/mL FlexPen 2-10 Units, 2-10 Units, Subcutaneous, TID CC and HS    HYDROcodone-acetaminophen (Norco) 5-325 MG per tab 1 tablet, 1 tablet, Oral, Q6H PRN    acetaminophen (Tylenol) tab 650 mg, 650 mg, Oral, Q6H PRN    glucose (Dex4) 15 GM/59ML oral liquid 15 g, 15 g, Oral, Q15 Min PRN **OR** glucose (Glutose) 40% oral gel 15 g, 15 g, Oral, Q15 Min PRN **OR** glucose-vitamin C (Dex-4) chewable tab 4 tablet, 4 tablet, Oral, Q15 Min PRN **OR** dextrose 50% injection 50 mL, 50 mL, Intravenous, Q15 Min PRN **OR** glucose (Dex4) 15 GM/59ML oral liquid 30 g, 30 g, Oral, Q15 Min PRN **OR** glucose (Glutose) 40% oral gel 30 g, 30 g, Oral, Q15 Min PRN **OR** glucose-vitamin C (Dex-4) chewable tab 8  tablet, 8 tablet, Oral, Q15 Min PRN    ondansetron (Zofran) 4 MG/2ML injection 4 mg, 4 mg, Intravenous, Q6H PRN    prochlorperazine (Compazine) 10 MG/2ML injection 5 mg, 5 mg, Intravenous, Q8H PRN    polyethylene glycol (PEG 3350) (Miralax) 17 g oral packet 17 g, 17 g, Oral, Daily PRN    sennosides (Senokot) tab 17.2 mg, 17.2 mg, Oral, Nightly PRN    bisacodyl (Dulcolax) 10 MG rectal suppository 10 mg, 10 mg, Rectal, Daily PRN    fleet enema (Fleet) 7-19 GM/118ML rectal enema 133 mL, 1 enema, Rectal, Once PRN    melatonin tab 3 mg, 3 mg, Oral, Nightly PRN   No current outpatient medications on file.        HISTORY:  Past Medical History:    Back problem    Benign essential HTN    Calculus of kidney    Cancer (HCC)    Melanoma    Diabetes (HCC)    High blood pressure    High cholesterol    Mixed hyperlipidemia    Osteoarthritis    Problems with swallowing    Renal disorder    CKD Stage2    Sarcoidosis    Sarcoidosis of lung (HCC)    Shortness of breath    Sleep apnea    Pt use CPAP at night    Type 2 diabetes mellitus without complication, without long-term current use of insulin (HCC)      Past Surgical History:   Procedure Laterality Date    Cath bare metal stent (bms)  10/2019    Colonoscopy N/A 9/29/2020    Procedure: COLONOSCOPY;  Surgeon: David Jo MD;  Location:  ENDOSCOPY    Other  07/2019    Left elbow surgery    Other surgical history  06/22/2022    Osteophytectomy cervical 2, cervical 3, cervical 4, cervical 5         Social history and family history negative related to present illness except as above.    PHYSICAL EXAM:   /59   Pulse 84   Temp 98.9 °F (37.2 °C) (Temporal)   Resp (!) 28   Ht 5' 5\" (1.651 m)   Wt 170 lb 6.7 oz (77.3 kg)   SpO2 94%   BMI 28.36 kg/m²   GENERAL:  Awake, alert, oriented x3. Non-tox, non-septic and in NAD.  HEENT:  Normocephalic, no scleral abnormalities.  Oropharynx clear, trachea ML.  NECK:  Supple, no masses, no lymphadenopathy.  LUNGS:   Clear to auscultation b/l, no rhonchi, rales, or wheezes.  CARDIO: RRR S1/S2, no rubs, clicks, heaves, or murmurs.  GI:  disteded ruq firm  EXTREMITIES:  No edema, no clubbing, no cyanosis.  NEURO:  No focal neurologic deficits.  DERM:  Warm, dry, no rashes.    IMPRESSION/PLAN:        Septic shock with mrsa bacteremia rx vanc; ct had bilateral hydro and now has cardona in his neobladder; most likely this is source; new temp so repeat study with ultrasound to check on hydro and as ruq tender check on g.b.; ultrasound noted persistent hydro; now with repeat ct scan showing hematoma perinephric into retroperitoneum too[see report] ; lovenox on hold  Carbuncle drained 5/16 too  Active urine sediment most likely from above; but will leave on cefepime till repeat blood and urine culture available; will dc cefepime  Multiple pulm nodules but with hx sarcoid; suggest getting us ct from [place] or taking our ct to [place] so they cna compare if any difference  No evidence endocarditis or seeded spine but these remain in ddx; temps seem likely from the bleed  Spoke with pt wife rn dr machuca.   Remains critically ill >35 min  Hx neobladder done at [facility]  Agree with [facility] transfer too               Recent Results (from the past 72 hour(s))   Urinalysis, Routine    Collection Time: 05/16/24  1:25 PM   Result Value Ref Range    Urine Color Colorless (A) Yellow    Clarity Urine Clear Clear    Spec Gravity 1.009 1.005 - 1.030    Glucose Urine >1000 (A) Normal mg/dL    Bilirubin Urine Negative Negative    Ketones Urine Negative Negative mg/dL    Blood Urine 1+ (A) Negative    pH Urine 6.5 5.0 - 8.0    Protein Urine 20 (A) Negative mg/dL    Urobilinogen Urine Normal Normal mg/dL    Nitrite Urine Negative Negative    Leukocyte Esterase Urine 75 (A) Negative    WBC Urine 21-50 (A) 0 - 5 /HPF    RBC Urine 3-5 (A) 0 - 2 /HPF    Bacteria Urine None Seen None Seen /HPF    Squamous Epi. Cells None Seen None Seen /HPF    Renal Tubular Epithelial Cells None  Seen None Seen /HPF    Transitional Cells None Seen None Seen /HPF    Yeast Urine None Seen None Seen /HPF   Urine Culture, Routine    Collection Time: 05/16/24  1:25 PM    Specimen: Urine, cardona catheter   Result Value Ref Range    Urine Culture No Growth at 18-24 hrs.    CBC W/ DIFFERENTIAL    Collection Time: 05/16/24  4:18 PM   Result Value Ref Range    WBC 7.1 4.0 - 11.0 x10(3) uL    RBC 2.91 (L) 4.30 - 5.70 x10(6)uL    HGB 8.3 (L) 13.0 - 17.5 g/dL    HCT 24.3 (L) 39.0 - 53.0 %    .0 (L) 150.0 - 450.0 10(3)uL    MCV 83.5 80.0 - 100.0 fL    MCH 28.5 26.0 - 34.0 pg    MCHC 34.2 31.0 - 37.0 g/dL    RDW 13.9 %    Neutrophil Absolute Prelim 6.05 1.50 - 7.70 x10 (3) uL    Neutrophil Absolute 6.05 1.50 - 7.70 x10(3) uL    Lymphocyte Absolute 0.43 (L) 1.00 - 4.00 x10(3) uL    Monocyte Absolute 0.48 0.10 - 1.00 x10(3) uL    Eosinophil Absolute 0.07 0.00 - 0.70 x10(3) uL    Basophil Absolute 0.01 0.00 - 0.20 x10(3) uL    Immature Granulocyte Absolute 0.03 0.00 - 1.00 x10(3) uL    Neutrophil % 85.6 %    Lymphocyte % 6.1 %    Monocyte % 6.8 %    Eosinophil % 1.0 %    Basophil % 0.1 %    Immature Granulocyte % 0.4 %   Aerobic Bacterial Culture    Collection Time: 05/16/24  5:29 PM    Specimen: perineal; Other   Result Value Ref Range    Aerobic Culture Result 2+ growth Staphylococcus aureus, MRSA (A)     Aerobic Smear 3+ WBCs seen     Aerobic Smear 2+ Gram Positive Cocci        Susceptibility    Staphylococcus aureus, MRSA -  (no method available)     Cefazolin  Resistant      Clindamycin <=0.25 Sensitive      Erythromycin >=8 Resistant      Gentamicin <=0.5 Sensitive      Levofloxacin 4 Resistant      Oxacillin >=4 Resistant      Tetracycline <=1 Sensitive      Trimethoprim/Sulfa <=10 Sensitive      Vancomycin 1 Sensitive    POCT Glucose    Collection Time: 05/16/24  5:32 PM   Result Value Ref Range    POC Glucose 124 (H) 70 - 99 mg/dL   Potassium    Collection Time: 05/16/24  9:18 PM   Result Value Ref Range     Potassium 4.2 3.5 - 5.1 mmol/L   Blood Culture    Collection Time: 05/16/24  9:18 PM    Specimen: Blood,peripheral   Result Value Ref Range    Blood Culture Result No Growth 2 Days    Blood Culture    Collection Time: 05/16/24  9:18 PM    Specimen: Blood,peripheral   Result Value Ref Range    Blood Culture Result No Growth 2 Days    POCT Glucose    Collection Time: 05/16/24  9:19 PM   Result Value Ref Range    POC Glucose 142 (H) 70 - 99 mg/dL   Comp Metabolic Panel (14)    Collection Time: 05/17/24  4:53 AM   Result Value Ref Range    Glucose 161 (H) 70 - 99 mg/dL    Sodium 139 136 - 145 mmol/L    Potassium 4.5 3.5 - 5.1 mmol/L    Chloride 107 98 - 112 mmol/L    CO2 27.0 21.0 - 32.0 mmol/L    Anion Gap 5 0 - 18 mmol/L    BUN 18 9 - 23 mg/dL    Creatinine 1.20 0.70 - 1.30 mg/dL    Calcium, Total 7.8 (L) 8.5 - 10.1 mg/dL    Calculated Osmolality 293 275 - 295 mOsm/kg    eGFR-Cr 69 >=60 mL/min/1.73m2    AST 25 15 - 37 U/L    ALT 34 16 - 61 U/L    Alkaline Phosphatase 93 45 - 117 U/L    Bilirubin, Total 0.4 0.1 - 2.0 mg/dL    Total Protein 6.4 6.4 - 8.2 g/dL    Albumin 1.8 (L) 3.4 - 5.0 g/dL    Globulin  4.6 (H) 2.8 - 4.4 g/dL    A/G Ratio 0.4 (L) 1.0 - 2.0   CBC W/ DIFFERENTIAL    Collection Time: 05/17/24  4:53 AM   Result Value Ref Range    WBC 8.3 4.0 - 11.0 x10(3) uL    RBC 2.80 (L) 4.30 - 5.70 x10(6)uL    HGB 7.9 (L) 13.0 - 17.5 g/dL    HCT 23.6 (L) 39.0 - 53.0 %    .0 (L) 150.0 - 450.0 10(3)uL    MCV 84.3 80.0 - 100.0 fL    MCH 28.2 26.0 - 34.0 pg    MCHC 33.5 31.0 - 37.0 g/dL    RDW 13.7 %    Neutrophil Absolute Prelim 7.04 1.50 - 7.70 x10 (3) uL    Neutrophil Absolute 7.04 1.50 - 7.70 x10(3) uL    Lymphocyte Absolute 0.45 (L) 1.00 - 4.00 x10(3) uL    Monocyte Absolute 0.73 0.10 - 1.00 x10(3) uL    Eosinophil Absolute 0.04 0.00 - 0.70 x10(3) uL    Basophil Absolute 0.01 0.00 - 0.20 x10(3) uL    Immature Granulocyte Absolute 0.04 0.00 - 1.00 x10(3) uL    Neutrophil % 84.7 %    Lymphocyte % 5.4 %     Monocyte % 8.8 %    Eosinophil % 0.5 %    Basophil % 0.1 %    Immature Granulocyte % 0.5 %   ABORH Confirmation    Collection Time: 05/17/24  4:53 AM   Result Value Ref Range    ABO BLOOD TYPE B     RH BLOOD TYPE Positive    POCT Glucose    Collection Time: 05/17/24  7:57 AM   Result Value Ref Range    POC Glucose 159 (H) 70 - 99 mg/dL   POCT Glucose    Collection Time: 05/17/24 11:23 AM   Result Value Ref Range    POC Glucose 157 (H) 70 - 99 mg/dL   POCT Glucose    Collection Time: 05/17/24  3:33 PM   Result Value Ref Range    POC Glucose 142 (H) 70 - 99 mg/dL   POCT Glucose    Collection Time: 05/17/24  5:20 PM   Result Value Ref Range    POC Glucose 141 (H) 70 - 99 mg/dL   POCT Glucose    Collection Time: 05/17/24  8:46 PM   Result Value Ref Range    POC Glucose 190 (H) 70 - 99 mg/dL   Vancomycin Trough, Serum    Collection Time: 05/18/24  2:27 AM   Result Value Ref Range    Vancomycin Trough 25.2 (H) 10.0 - 20.0 ug/mL   Comp Metabolic Panel (14)    Collection Time: 05/18/24  2:27 AM   Result Value Ref Range    Glucose 172 (H) 70 - 99 mg/dL    Sodium 133 (L) 136 - 145 mmol/L    Potassium 4.1 3.5 - 5.1 mmol/L    Chloride 102 98 - 112 mmol/L    CO2 26.0 21.0 - 32.0 mmol/L    Anion Gap 5 0 - 18 mmol/L    BUN 22 9 - 23 mg/dL    Creatinine 1.64 (H) 0.70 - 1.30 mg/dL    Calcium, Total 7.8 (L) 8.5 - 10.1 mg/dL    Calculated Osmolality 283 275 - 295 mOsm/kg    eGFR-Cr 47 (L) >=60 mL/min/1.73m2    AST 31 15 - 37 U/L    ALT 34 16 - 61 U/L    Alkaline Phosphatase 93 45 - 117 U/L    Bilirubin, Total 0.3 0.1 - 2.0 mg/dL    Total Protein 6.3 (L) 6.4 - 8.2 g/dL    Albumin 1.6 (L) 3.4 - 5.0 g/dL    Globulin  4.7 (H) 2.8 - 4.4 g/dL    A/G Ratio 0.3 (L) 1.0 - 2.0   CBC W/ DIFFERENTIAL    Collection Time: 05/18/24  2:27 AM   Result Value Ref Range    WBC 7.5 4.0 - 11.0 x10(3) uL    RBC 2.46 (L) 4.30 - 5.70 x10(6)uL    HGB 6.7 (LL) 13.0 - 17.5 g/dL    HCT 21.1 (L) 39.0 - 53.0 %    .0 (L) 150.0 - 450.0 10(3)uL    MCV 85.8 80.0  - 100.0 fL    MCH 27.2 26.0 - 34.0 pg    MCHC 31.8 31.0 - 37.0 g/dL    RDW 13.6 %    Neutrophil Absolute Prelim 5.94 1.50 - 7.70 x10 (3) uL    Neutrophil Absolute 5.94 1.50 - 7.70 x10(3) uL    Lymphocyte Absolute 0.51 (L) 1.00 - 4.00 x10(3) uL    Monocyte Absolute 0.82 0.10 - 1.00 x10(3) uL    Eosinophil Absolute 0.14 0.00 - 0.70 x10(3) uL    Basophil Absolute 0.01 0.00 - 0.20 x10(3) uL    Immature Granulocyte Absolute 0.05 0.00 - 1.00 x10(3) uL    Neutrophil % 79.5 %    Lymphocyte % 6.8 %    Monocyte % 11.0 %    Eosinophil % 1.9 %    Basophil % 0.1 %    Immature Granulocyte % 0.7 %   ABORH (Blood Type)    Collection Time: 05/18/24  3:29 AM   Result Value Ref Range    ABO BLOOD TYPE B     RH BLOOD TYPE Positive    Antibody Screen    Collection Time: 05/18/24  3:29 AM   Result Value Ref Range    Antibody Screen Negative    POCT Glucose    Collection Time: 05/18/24  9:14 AM   Result Value Ref Range    POC Glucose 154 (H) 70 - 99 mg/dL   Hemoglobin    Collection Time: 05/18/24  9:22 AM   Result Value Ref Range    HGB 8.1 (L) 13.0 - 17.5 g/dL   POCT Glucose    Collection Time: 05/18/24 12:52 PM   Result Value Ref Range    POC Glucose 237 (H) 70 - 99 mg/dL   Hemoglobin & Hematocrit    Collection Time: 05/18/24  3:06 PM   Result Value Ref Range    HGB 7.9 (L) 13.0 - 17.5 g/dL    HCT 25.5 (L) 39.0 - 53.0 %   Hemoglobin & Hematocrit    Collection Time: 05/18/24  3:07 PM   Result Value Ref Range    HGB 8.0 (L) 13.0 - 17.5 g/dL    HCT 24.3 (L) 39.0 - 53.0 %   POCT Glucose    Collection Time: 05/18/24  6:22 PM   Result Value Ref Range    POC Glucose 299 (H) 70 - 99 mg/dL   POCT Glucose    Collection Time: 05/18/24  8:40 PM   Result Value Ref Range    POC Glucose 300 (H) 70 - 99 mg/dL   Prepare RBC Once    Collection Time: 05/19/24 12:30 AM   Result Value Ref Range    Blood Product [id number]     Unit Number [id number]     UNIT ABO B     UNIT RH POS     Product Status Presumed Transfused     Expiration Date [id number]      Blood Type Barcode 7300     Unit Volume 350 ml   Vancomycin, random    Collection Time: 05/19/24  1:05 AM   Result Value Ref Range    Vancomycin Random 13.7 <=40.0 ug/mL   Hemoglobin & Hematocrit    Collection Time: 05/19/24  1:05 AM   Result Value Ref Range    HGB 7.0 (L) 13.0 - 17.5 g/dL    HCT 20.8 (L) 39.0 - 53.0 %   POCT Glucose    Collection Time: 05/19/24  7:50 AM   Result Value Ref Range    POC Glucose 131 (H) 70 - 99 mg/dL   Hemoglobin & Hematocrit    Collection Time: 05/19/24  7:51 AM   Result Value Ref Range    HGB 7.7 (L) 13.0 - 17.5 g/dL    HCT 24.5 (L) 39.0 - 53.0 %   Comp Metabolic Panel (14)    Collection Time: 05/19/24  7:52 AM   Result Value Ref Range    Glucose 130 (H) 70 - 99 mg/dL    Sodium 134 (L) 136 - 145 mmol/L    Potassium 3.7 3.5 - 5.1 mmol/L    Chloride 101 98 - 112 mmol/L    CO2 27.0 21.0 - 32.0 mmol/L    Anion Gap 6 0 - 18 mmol/L    BUN 19 9 - 23 mg/dL    Creatinine 1.43 (H) 0.70 - 1.30 mg/dL    Calcium, Total 8.2 (L) 8.5 - 10.1 mg/dL    Calculated Osmolality 282 275 - 295 mOsm/kg    eGFR-Cr 56 (L) >=60 mL/min/1.73m2    AST 31 15 - 37 U/L    ALT 35 16 - 61 U/L    Alkaline Phosphatase 107 45 - 117 U/L    Bilirubin, Total 0.4 0.1 - 2.0 mg/dL    Total Protein 6.7 6.4 - 8.2 g/dL    Albumin 1.7 (L) 3.4 - 5.0 g/dL    Globulin  5.0 (H) 2.8 - 4.4 g/dL    A/G Ratio 0.3 (L) 1.0 - 2.0   CBC W/ DIFFERENTIAL    Collection Time: 05/19/24  7:52 AM   Result Value Ref Range    WBC 8.3 4.0 - 11.0 x10(3) uL    RBC 2.88 (L) 4.30 - 5.70 x10(6)uL    HGB 7.5 (L) 13.0 - 17.5 g/dL    HCT 23.9 (L) 39.0 - 53.0 %    .0 150.0 - 450.0 10(3)uL    MCV 83.0 80.0 - 100.0 fL    MCH 26.0 26.0 - 34.0 pg    MCHC 31.4 31.0 - 37.0 g/dL    RDW 17.1 %    Neutrophil Absolute Prelim 6.83 1.50 - 7.70 x10 (3) uL    Neutrophil Absolute 6.83 1.50 - 7.70 x10(3) uL    Lymphocyte Absolute 0.57 (L) 1.00 - 4.00 x10(3) uL    Monocyte Absolute 0.59 0.10 - 1.00 x10(3) uL    Eosinophil Absolute 0.17 0.00 - 0.70 x10(3) uL    Basophil  Absolute 0.01 0.00 - 0.20 x10(3) uL    Immature Granulocyte Absolute 0.08 0.00 - 1.00 x10(3) uL    Neutrophil % 82.7 %    Lymphocyte % 6.9 %    Monocyte % 7.2 %    Eosinophil % 2.1 %    Basophil % 0.1 %    Immature Granulocyte % 1.0 %   POCT Glucose    Collection Time: 05/19/24 12:14 PM   Result Value Ref Range    POC Glucose 216 (H) 70 - 99 mg/dL         Navi Chow MD     CALL [facility] AT [phone] IF QUESTIONS OR CONCERNS  THANKS

## 2024-05-19 NOTE — PROGRESS NOTES
[facility] Hospitalist Progress Note     CC: Hospital Follow up    PCP: Antione Valadez DO       Subjective:     Patient seen and examined.  Reviewed CT results.  Abd pain stable.  Denies CP/SOB.  NAD.     OBJECTIVE:    Blood pressure 125/61, pulse 82, temperature 99.9 °F (37.7 °C), temperature source Temporal, resp. rate 22, height 5' 5\" (1.651 m), weight 170 lb 6.7 oz (77.3 kg), SpO2 96%.    Temp:  [98.3 °F (36.8 °C)-100.9 °F (38.3 °C)] 99.9 °F (37.7 °C)  Pulse:  [76-94] 82  Resp:  [19-28] 22  BP: (103-137)/(56-89) 125/61  SpO2:  [93 %-99 %] 96 %      Intake/Output:    Intake/Output Summary (Last 24 hours) at 5/19/2024 1040  Last data filed at 5/19/2024 0542  Gross per 24 hour   Intake 397 ml   Output 2360 ml   Net -1963 ml       Last 3 Weights   05/18/24 0400 170 lb 6.7 oz (77.3 kg)   05/17/24 0400 171 lb 11.8 oz (77.9 kg)   05/15/24 0500 166 lb 3.6 oz (75.4 kg)   05/14/24 1149 160 lb 0.9 oz (72.6 kg)   05/14/24 1110 160 lb 0.9 oz (72.6 kg)   05/14/24 0454 174 lb (78.9 kg)   06/22/22 0645 170 lb (77.1 kg)   06/21/22 0831 169 lb (76.7 kg)   04/05/22 1450 175 lb (79.4 kg)       Exam   Gen: No acute distress, alert and oriented x3, no focal neurologic deficits, appears tired, central line in place  Heent: NC AT, mucous memb clear, neck supple  Pulm: Limited exam, diminished, supplemental oxygen,  CV: Heart with regular rate and rhythm, no peripheral edema  Abd: Abdomen soft, vaguely tender throughout the abdomen, nondistended, bowel sounds present  MSK: Moving all extremities spontaneously, gait not assessed  Skin: Carbuncle noted on the right suprapubic region that is very tender to touch  Neuro: AO*3, motor intact, no sensory deficits  Psyc: appropriate mood and affect      Data Review:       Labs:     Recent Labs   Lab 05/17/24  0453 05/18/24  0227 05/18/24  0922 05/19/24  0105 05/19/24  0751 05/19/24  0752   RBC 2.80* 2.46*  --   --   --  2.88*   HGB 7.9* 6.7*   < > 7.0* 7.7* 7.5*   HCT 23.6* 21.1*   < > 20.8* 24.5*  23.9*   MCV 84.3 85.8  --   --   --  83.0   MCH 28.2 27.2  --   --   --  26.0   MCHC 33.5 31.8  --   --   --  31.4   RDW 13.7 13.6  --   --   --  17.1   NEPRELIM 7.04 5.94  --   --   --  6.83   WBC 8.3 7.5  --   --   --  8.3   .0* 131.0*  --   --   --  174.0    < > = values in this interval not displayed.         Recent Labs   Lab 05/17/24  0453 05/18/24 0227 05/19/24  0752   * 172* 130*   BUN 18 22 19   CREATSERUM 1.20 1.64* 1.43*   EGFRCR 69 47* 56*   CA 7.8* 7.8* 8.2*    133* 134*   K 4.5 4.1 3.7    102 101   CO2 27.0 26.0 27.0       Recent Labs   Lab 05/14/24  1354 05/15/24  0353 05/17/24  0453 05/18/24 0227 05/19/24  0752   ALT 38 34 34 34 35   AST 20 19 25 31 31   ALB 2.2* 1.9* 1.8* 1.6* 1.7*         Imaging:  CT ABDOMEN+PELVIS(CPT=74176)    Result Date: 5/18/2024  CONCLUSION:  1. There is 9.9 cm right perinephric hematoma with slight extension of hemorrhage into the right retroperitoneal space.  This finding was reported to KELECHI Talley on May 18, 2024 at 2:00 p.m.. 2. There is postoperative change associated with the bladder which could represent surgical augmentation of bladder or neobladder creation.  Correlation with specific surgical history is necessary.  There is a Rodriguez catheter within the surgically altered  bladder. 3. There is mild bilateral hydronephrosis.  There is also air in the right renal collecting system probably related to the prior surgery. 4. There are small bilateral pleural effusions.   LOCATION:  [facility]   Dictated by (CST): Scottie Allerd MD on 5/18/2024 at 1:55 PM     Finalized by (CST): Scottie Allred MD on 5/18/2024 at 2:04 PM       CT BRAIN OR HEAD (39808)    Result Date: 5/18/2024  CONCLUSION: 1. No acute intracranial findings 2. Chronic microvascular ischemic changes    LOCATION:  [facility]   Dictated by (CST): Quintin Rossi MD on 5/18/2024 at 1:59 PM     Finalized by (CST): Quintin Rossi MD on 5/18/2024 at 2:00 PM       US ABDOMEN COMPLETE  (CPT=76700)    Result Date: 5/18/2024  CONCLUSION:  1. Mild to moderate bilateral hydronephrosis is noted similar to prior CT scan. 2. There is echogenic debris within gallbladder lumen consistent with gallbladder sludge.  There is otherwise no specific evidence of cholecystitis. 3. Complicated lesions in both kidneys described on CT are not identified on ultrasound.  Follow-up dedicated three-phase CT or MRI of kidneys should be considered.   LOCATION:  [facility]    Dictated by (CST): Scottie Allred MD on 5/18/2024 at 8:43 AM     Finalized by (CST): Scottie Allred MD on 5/18/2024 at 8:46 AM          Meds:      Vancomycin IV  1 each Intravenous See Admin Instructions (RX holding)    cefepime  1 g Intravenous Q12H    insulin aspart  2-10 Units Subcutaneous TID CC and HS    pantoprazole  40 mg Intravenous 2 times per day      lactated ringers           diphenhydrAMINE    LORazepam    HYDROcodone-acetaminophen    acetaminophen    glucose **OR** glucose **OR** glucose-vitamin C **OR** dextrose **OR** glucose **OR** glucose **OR** glucose-vitamin C    ondansetron    prochlorperazine    polyethylene glycol (PEG 3350)    sennosides    bisacodyl    fleet enema    melatonin       Assessment/Plan:     Principal Problem:    Sepsis, due to unspecified organism, unspecified whether acute organ dysfunction present (HCC)  Active Problems:    Anemia    Azotemia    Leukocytosis    Hyperglycemia    Metabolic acidosis    Patient is a 59 year old male with PMH sig for bladder cancer status post neobladder creation in March, nonischemic cardiomyopathy chronic low back pain status post spinal stimulator, pulmonary sarcoidosis who presents for evaluation of 1 week of generalized weakness, some shortness of breath, cough.  Patient managed for septic shock secondary to MRSA bacteremia.     Septic shock- resolved  MRSA bacteremia - ?etiology, carbuncle or given recent surgery for neobladder at [facility]  - IV pressors, wean as tolerated  - can dc  IVF  - Lactate within normal range  - 2D echo-no evidence of endocarditis  - Empiric cefepime and azithromycin, added vancomycin - per ID   - Blood cultures 2 out of 2 positive for MRSA, urine cultures neg, repeat blood cultures- NGTD  - ID on consult, recs reviewed  - ICU management - stable for transfer  - abdominal pain dizziness, abdominal US pending r/o GB etiology - no biliary source  - repeat CT A/P - d/w ID, urology, CC given ongoing fevers    R perinephric/Retroperitoneal hematoma, 9.9cm   Acute on chronic anemia  - H/H q8h  - monitor for active bleeding  - goal Hgb>7, transfuse PRN  - may need IR embolization if active bleeding  - await further urology recommendations  - hold all AC/antiplatelets     UTI  - Abnormal UA, cx neg  - CT evidence of hydronephrosis, bilateral  - In the setting of recent neobladder creation  - Continue IV cefepime, vanc per ID   - Urology on consult, appreciate input  - Maintain Rodriguez    Carbuncle, R suprapubic region  - s/p bedside I&D by urology  - cx with MRSA    ELENA- resolved  - In the setting of UTI and sepsis  - Follow BMP  - will resume LR in setting of anemia/hypovolemia and mild Cr elevation     History of bladder cancer  - Status post recent neobladder creation at [facility] in March 2024  - Follows with [facility] urologist     History of pulmonary sarcoidosis  - Multiple nodules on CT chest, unclear if infection versus sarcoid related  - Plan to treat with IV antibiotics for now, may need outpatient monitoring with imaging  - Has chronic shortness of breath, currently bacteremic no indication for steroids at this time     Nonischemic cardiomyopathy  - Echo without any vegetation, normal EF  - Can resume aspirin and statin once out of the ICU     Hypertension  - Hold antihypertensives until BP improves     Hyperlipidemia  - Hold statin for now     Acidosis, nongap - resolving   - Presenting bicarb 10, in the setting of sepsis     Type 2 diabetes with hyperglycemia  - Hold home  insulin  - Accu-Cheks, increased to medium dose ISS, may need long-acting coverage when appetite improves     FN:  - IVF: per ICU   - Diet: Diabetic     DVT Prophy: SCDs, heparin subcu (held for RP hematoma)   Atrophy: Ambulate as tolerated  Lines: PIV     Dispo: ICU  Outpatient records or previous hospital records reviewed.      Further recommendations pending patient's clinical course.  [facility] hospitalist to continue to follow patient while in house     Patient and/or patient's family given opportunity to ask questions and note understanding and agreeing with therapeutic plan as outlined     Thank You,    [facility] Odom Hospitalist  Answering Service number: [phone]

## 2024-05-19 NOTE — PLAN OF CARE
Received patient after report. Patient resting in bed on room air. C/o abdominal pain, see flowsheets. Rodriguez intact, irrigated. Monitoring for s/s of active bleeding. Family at bedside. Transfer orders in place, awaiting available bed. Updated on plan of care.

## 2024-05-19 NOTE — PROGRESS NOTES
[facility] Pharmacy Dosing Service      Follow Up Pharmacokinetic Consult for Vancomycin Dosing     Yunior Garrett is a 59 year old male who is receiving vancomycin therapy for MRSA bacteremia. Patient is on day 5 of vancomycin and was on a regimen of 1250 mg IV q12h but trough became supratherapeutic and held (last dose given  @ 1430). The current treatment and monitoring approach is non-AUC strategy.        Weight and Temperature:    Wt Readings from Last 1 Encounters:   24 77.3 kg (170 lb 6.7 oz)         Temp Readings from Last 1 Encounters:   24 99.9 °F (37.7 °C) (Temporal)      Labs:   Recent Labs   Lab 24  0752   CREATSERUM 1.20 1.64* 1.43*      Estimated Creatinine Clearance: 47.2 mL/min (A) (based on SCr of 1.43 mg/dL (H)).     Recent Labs   Lab 24  0752   WBC 8.3 7.5 8.3        Vancomycin Levels:  Lab Results   Component Value Date/Time    VANCR 13.7 2024 01:05 AM    VANCT 25.2 (H) 2024 02:27 AM    VANCT 14.6 05/15/2024 10:57 AM    VANCP 30.4 05/15/2024 04:19 AM       Corresponding 24 h-AUC: N/A     The Pharmacokinetic Target is:    Patient-specific trough of 9.7 - 12.5 based on kinetic data when CrCl was slightly better    Renal Dosing Considerations:    ELENA/ARF     Assessment/Plan:   Maintenance Regimen: Adjust vancomycin to 1250 mg IV every 36 hours, giving a dose now.    Monitorin) Plan for vancomycin trough to be obtained in approximately 36-72 hrs depending on renal function.    2) Pharmacy will order SCr as clinically indicated to assess renal function.    3) Pharmacy will monitor for toxicity and efficacy, adjust vancomycin dose and/or frequency, and order vancomycin levels as appropriate per the Pharmacy and Therapeutics Committee approved protocol until discontinuation of the medication.       We appreciate the opportunity to assist in the care of this patient.     Ximena  Praveena, PharmD  5/19/2024  11:51 AM  [facility] Extension: [phone]

## 2024-05-19 NOTE — PROGRESS NOTES
[facility]  Urology Progress Note    Yunior Garrett Patient Status:  Inpatient    1965 MRN [id number]   Location [facility] 4SW-A Attending Adrienne Chahal DO   Hosp Day # 5 PCP Antione Valadez DO     Subjective:  Yunior Garrett is a(n) 59 year old male.    Feeling okay overall. Mild abdominal pain R>L with movement.    Objective:  Temp (24hrs), Av.5 °F (37.5 °C), Min:98.3 °F (36.8 °C), Max:100.9 °F (38.3 °C)    /59   Pulse 84   Temp 98.9 °F (37.2 °C) (Temporal)   Resp (!) 28   Ht 5' 5\" (1.651 m)   Wt 170 lb 6.7 oz (77.3 kg)   SpO2 94%   BMI 28.36 kg/m²     Intake/Output Summary (Last 24 hours) at 2024 1354  Last data filed at 2024 1232  Gross per 24 hour   Intake 547 ml   Output 3110 ml   Net -2563 ml     General: Calm, NAD  HEENT: NCAT, no scleral icterus  CV: RR  Pulmonary: breathing unlabored, symmetric bilaterally, no audible wheezes  Abdomen: soft, mild ttp R>L, ND, no masses, no rebound, no guarding, no rigidity  RLQ abscess site incision c/d/i  Rodriguez catheter present, off traction, draining clear urine to gravity  Lab Results   Component Value Date    WBC 8.3 2024    HGB 7.5 2024    HCT 23.9 2024    .0 2024    CREATSERUM 1.43 2024    BUN 19 2024     2024    K 3.7 2024     2024    CO2 27.0 2024     2024    CA 8.2 2024    ALB 1.7 2024    ALKPHO 107 2024    BILT 0.4 2024    TP 6.7 2024    AST 31 2024    ALT 35 2024    PGLU 216 2024       Hospital Encounter on 24   1. Blood Culture     Status: None (Preliminary result)    Collection Time: 24  9:18 PM    Specimen: Blood,peripheral   Result Value Ref Range    Blood Culture Result No Growth 2 Days N/A   2. Aerobic Bacterial Culture     Status: Abnormal    Collection Time: 24  5:29 PM    Specimen: perineal; Other   Result Value Ref Range    Aerobic  Culture Result 2+ growth Staphylococcus aureus, MRSA (A) N/A    Aerobic Smear 3+ WBCs seen N/A    Aerobic Smear 2+ Gram Positive Cocci N/A       Susceptibility    Staphylococcus aureus, MRSA -  (no method available)     Cefazolin  Resistant      Clindamycin <=0.25 Sensitive      Erythromycin >=8 Resistant      Gentamicin <=0.5 Sensitive      Levofloxacin 4 Resistant      Oxacillin >=4 Resistant      Tetracycline <=1 Sensitive      Trimethoprim/Sulfa <=10 Sensitive      Vancomycin 1 Sensitive    3. Urine Culture, Routine     Status: None    Collection Time: 05/16/24  1:25 PM    Specimen: Urine, cardona catheter   Result Value Ref Range    Urine Culture No Growth at 18-24 hrs. N/A    Abscess cx MRSA    CT ABDOMEN+PELVIS(CPT=74176)    Result Date: 5/18/2024  CONCLUSION:  1. There is 9.9 cm right perinephric hematoma with slight extension of hemorrhage into the right retroperitoneal space.  This finding was reported to KELECHI Talley on May 18, 2024 at 2:00 p.m.. 2. There is postoperative change associated with the bladder which could represent surgical augmentation of bladder or neobladder creation.  Correlation with specific surgical history is necessary.  There is a Cardona catheter within the surgically altered  bladder. 3. There is mild bilateral hydronephrosis.  There is also air in the right renal collecting system probably related to the prior surgery. 4. There are small bilateral pleural effusions.   LOCATION:  [facility]   Dictated by (CST): Scottie Allred MD on 5/18/2024 at 1:55 PM     Finalized by (CST): Scottie Allred MD on 5/18/2024 at 2:04 PM       CT BRAIN OR HEAD ([id number])    Result Date: 5/18/2024  CONCLUSION: 1. No acute intracranial findings 2. Chronic microvascular ischemic changes    LOCATION:  [facility]   Dictated by (CST): Quintin Rossi MD on 5/18/2024 at 1:59 PM     Finalized by (CST): Quintin Rossi MD on 5/18/2024 at 2:00 PM       US ABDOMEN COMPLETE (CPT=76700)    Result Date: 5/18/2024  CONCLUSION:  1.  Mild to moderate bilateral hydronephrosis is noted similar to prior CT scan. 2. There is echogenic debris within gallbladder lumen consistent with gallbladder sludge.  There is otherwise no specific evidence of cholecystitis. 3. Complicated lesions in both kidneys described on CT are not identified on ultrasound.  Follow-up dedicated three-phase CT or MRI of kidneys should be considered.   LOCATION:  [facility]    Dictated by (CST): Scottie Allred MD on 5/18/2024 at 8:43 AM     Finalized by (CST): Scottie Allred MD on 5/18/2024 at 8:46 AM         Assessment:    Septic shock  Bacteremia  Bilateral hydronephrosis/hydroureter  ELENA     Tm 100.9 overnight->fever curve decreasing, UCx neg     History of BCG unresponsive T1/Tcis bladder cancer   Status post radical cystectomy orthotopic neobladder on 3/14/24.     Status-post I and D of SP carbuncle 5/16/24 with Dr. Poon  Cx with 2+ Staph aureus on prelim    Right perinephric hematoma  -5/18/24 CT reviewed and compared back to previous - possible small underlying mass that bled? Doubt related to a cyst    Plan:    Continue abx per ID and await final cultures  Continued cardona  Continue to monitor Hgb  If renal function will tolerate, will plan for a repeat CT abd with IV contrast tomorrow to re-eval with right renal parenchyma and the source for bleeding  Agree with transfer to [facility] when bed available given recent cystectomy history    Patient and wife verbalized understanding and all questions answered.      Discussed with KELECHI Calix D.O.  [facility] Urology

## 2024-05-20 ENCOUNTER — APPOINTMENT (OUTPATIENT)
Dept: CT IMAGING | Facility: HOSPITAL | Age: 59
DRG: 871 | End: 2024-05-20
Attending: PHYSICIAN ASSISTANT

## 2024-05-20 LAB
ALBUMIN SERPL-MCNC: 1.6 G/DL (ref 3.4–5)
ALBUMIN/GLOB SERPL: 0.3 {RATIO} (ref 1–2)
ALP LIVER SERPL-CCNC: 107 U/L
ALT SERPL-CCNC: 32 U/L
ANION GAP SERPL CALC-SCNC: 4 MMOL/L (ref 0–18)
AST SERPL-CCNC: 25 U/L (ref 15–37)
BASOPHILS # BLD AUTO: 0.02 X10(3) UL (ref 0–0.2)
BASOPHILS NFR BLD AUTO: 0.2 %
BILIRUB SERPL-MCNC: 0.4 MG/DL (ref 0.1–2)
BUN BLD-MCNC: 16 MG/DL (ref 9–23)
CALCIUM BLD-MCNC: 8 MG/DL (ref 8.5–10.1)
CHLORIDE SERPL-SCNC: 103 MMOL/L (ref 98–112)
CO2 SERPL-SCNC: 27 MMOL/L (ref 21–32)
CREAT BLD-MCNC: 1.23 MG/DL
EGFRCR SERPLBLD CKD-EPI 2021: 67 ML/MIN/1.73M2 (ref 60–?)
EOSINOPHIL # BLD AUTO: 0.27 X10(3) UL (ref 0–0.7)
EOSINOPHIL NFR BLD AUTO: 2.6 %
ERYTHROCYTE [DISTWIDTH] IN BLOOD BY AUTOMATED COUNT: 16.7 %
GLOBULIN PLAS-MCNC: 4.9 G/DL (ref 2.8–4.4)
GLUCOSE BLD-MCNC: 134 MG/DL (ref 70–99)
GLUCOSE BLD-MCNC: 136 MG/DL (ref 70–99)
GLUCOSE BLD-MCNC: 187 MG/DL (ref 70–99)
GLUCOSE BLD-MCNC: 196 MG/DL (ref 70–99)
GLUCOSE BLD-MCNC: 209 MG/DL (ref 70–99)
HCT VFR BLD AUTO: 21.9 %
HCT VFR BLD AUTO: 25.6 %
HCT VFR BLD AUTO: 25.8 %
HCT VFR BLD AUTO: 27 %
HGB BLD-MCNC: 6.8 G/DL
HGB BLD-MCNC: 8.1 G/DL
HGB BLD-MCNC: 8.2 G/DL
HGB BLD-MCNC: 8.4 G/DL
HGB BLD-MCNC: 8.4 G/DL
IMM GRANULOCYTES # BLD AUTO: 0.17 X10(3) UL (ref 0–1)
IMM GRANULOCYTES NFR BLD: 1.7 %
LYMPHOCYTES # BLD AUTO: 0.72 X10(3) UL (ref 1–4)
LYMPHOCYTES NFR BLD AUTO: 7 %
MCH RBC QN AUTO: 26.8 PG (ref 26–34)
MCHC RBC AUTO-ENTMCNC: 32.6 G/DL (ref 31–37)
MCV RBC AUTO: 82.4 FL
MONOCYTES # BLD AUTO: 0.76 X10(3) UL (ref 0.1–1)
MONOCYTES NFR BLD AUTO: 7.4 %
NEUTROPHILS # BLD AUTO: 8.34 X10 (3) UL (ref 1.5–7.7)
NEUTROPHILS # BLD AUTO: 8.34 X10(3) UL (ref 1.5–7.7)
NEUTROPHILS NFR BLD AUTO: 81.1 %
OSMOLALITY SERPL CALC.SUM OF ELEC: 281 MOSM/KG (ref 275–295)
PLATELET # BLD AUTO: 183 10(3)UL (ref 150–450)
POTASSIUM SERPL-SCNC: 4 MMOL/L (ref 3.5–5.1)
POTASSIUM SERPL-SCNC: 4 MMOL/L (ref 3.5–5.1)
PROT SERPL-MCNC: 6.5 G/DL (ref 6.4–8.2)
RBC # BLD AUTO: 3.13 X10(6)UL
SODIUM SERPL-SCNC: 134 MMOL/L (ref 136–145)
VANCOMYCIN SERPL-MCNC: 13.6 UG/ML (ref ?–40)
WBC # BLD AUTO: 10.3 X10(3) UL (ref 4–11)

## 2024-05-20 PROCEDURE — 76376 3D RENDER W/INTRP POSTPROCES: CPT | Performed by: PHYSICIAN ASSISTANT

## 2024-05-20 PROCEDURE — BT10YZZ FLUOROSCOPY OF BLADDER USING OTHER CONTRAST: ICD-10-PCS | Performed by: RADIOLOGY

## 2024-05-20 PROCEDURE — 74178 CT ABD&PLV WO CNTR FLWD CNTR: CPT | Performed by: PHYSICIAN ASSISTANT

## 2024-05-20 PROCEDURE — 76377 3D RENDER W/INTRP POSTPROCES: CPT | Performed by: PHYSICIAN ASSISTANT

## 2024-05-20 RX ORDER — SODIUM CHLORIDE 9 MG/ML
INJECTION, SOLUTION INTRAVENOUS ONCE
Status: COMPLETED | OUTPATIENT
Start: 2024-05-20 | End: 2024-05-20

## 2024-05-20 NOTE — TRANSFER CENTER NOTE
Contacted [facility] transfer [place]. They are inquiring on the reason for transfer. They understand it is for continuity of care but they want to know if the pt needs a procedure or surgery that [facility] cannot accommodate.    Dr Nur will evaluate the pt this morning.

## 2024-05-20 NOTE — PROGRESS NOTES
[facility] Hospitalist Progress Note     CC: Hospital Follow up    PCP: Antione Valadez DO       Subjective:     Patient seen and examined.  Abd pain present.   Denies CP/SOB.  NAD.     OBJECTIVE:    Blood pressure 117/67, pulse 78, temperature 98.7 °F (37.1 °C), temperature source Oral, resp. rate 16, height 5' 5\" (1.651 m), weight 164 lb 14.4 oz (74.8 kg), SpO2 97%.    Temp:  [98 °F (36.7 °C)-99.3 °F (37.4 °C)] 98.7 °F (37.1 °C)  Pulse:  [68-84] 78  Resp:  [16-33] 16  BP: (105-147)/(54-82) 117/67  SpO2:  [93 %-97 %] 97 %      Intake/Output:    Intake/Output Summary (Last 24 hours) at 5/20/2024 1413  Last data filed at 5/20/2024 0515  Gross per 24 hour   Intake 1912.5 ml   Output 2250 ml   Net -337.5 ml       Last 3 Weights   05/20/24 0515 164 lb 14.4 oz (74.8 kg)   05/18/24 0400 170 lb 6.7 oz (77.3 kg)   05/17/24 0400 171 lb 11.8 oz (77.9 kg)   05/15/24 0500 166 lb 3.6 oz (75.4 kg)   05/14/24 1149 160 lb 0.9 oz (72.6 kg)   05/14/24 1110 160 lb 0.9 oz (72.6 kg)   05/14/24 0454 174 lb (78.9 kg)   06/22/22 0645 170 lb (77.1 kg)   06/21/22 0831 169 lb (76.7 kg)   04/05/22 1450 175 lb (79.4 kg)       Exam   Gen: No acute distress, alert and oriented x3, no focal neurologic deficits, appears tired, central line in place  Heent: NC AT, mucous memb clear, neck supple  Pulm: Limited exam, diminished, supplemental oxygen,  CV: Heart with regular rate and rhythm, no peripheral edema  Abd: Abdomen soft, vaguely tender throughout the abdomen, nondistended, bowel sounds present  MSK: Moving all extremities spontaneously, gait not assessed  Skin: Carbuncle noted on the right suprapubic region that is very tender to touch  Neuro: AO*3, motor intact, no sensory deficits  Psyc: appropriate mood and affect      Data Review:       Labs:     Recent Labs   Lab 05/18/24  0227 05/18/24  0922 05/19/24  0752 05/19/24  1653 05/20/24  0031 05/20/24  0538   RBC 2.46*  --  2.88*  --   --  3.13*   HGB 6.7*   < > 7.5* 7.4* 6.8* 8.4*  8.2*  8.1*   HCT  21.1*   < > 23.9* 22.9* 21.9* 25.8*  25.6*   MCV 85.8  --  83.0  --   --  82.4   MCH 27.2  --  26.0  --   --  26.8   MCHC 31.8  --  31.4  --   --  32.6   RDW 13.6  --  17.1  --   --  16.7   NEPRELIM 5.94  --  6.83  --   --  8.34*   WBC 7.5  --  8.3  --   --  10.3   .0*  --  174.0  --   --  183.0    < > = values in this interval not displayed.         Recent Labs   Lab 05/18/24 0227 05/19/24  0752 05/20/24  0538   * 130* 134*   BUN 22 19 16   CREATSERUM 1.64* 1.43* 1.23   EGFRCR 47* 56* 67   CA 7.8* 8.2* 8.0*   * 134* 134*   K 4.1 3.7 4.0  4.0    101 103   CO2 26.0 27.0 27.0       Recent Labs   Lab 05/15/24  0353 05/17/24  0453 05/18/24 0227 05/19/24  0752 05/20/24  0538   ALT 34 34 34 35 32   AST 19 25 31 31 25   ALB 1.9* 1.8* 1.6* 1.7* 1.6*         Imaging:  CT UROGRAM(W+WO) W/3D(CPT=74178/76578)    Result Date: 5/20/2024  CONCLUSION:  1. Stable right perinephric hematoma measuring approximately 9.1 x 3.5 cm.  No evidence for active extravasation is noted. 2. Moderate left-sided hydronephrosis without obstructing urolithiasis.  There is air within the right renal collecting system and ureter which may be secondary to postoperative changes. 3. Postoperative changes from cystectomy and neobladder creation. 4. Small bilateral pleural effusions with associated areas of consolidation.  There also nodular infiltrates noted within the lung bases which are nonspecific but infectious etiology cannot be excluded.     LOCATION:  [id number]   Dictated by (CST): Quintin Rossi MD on 5/20/2024 at 1:39 PM     Finalized by (CST): Quintin Rossi MD on 5/20/2024 at 1:45 PM       CT ABDOMEN+PELVIS(CPT=74176)    Result Date: 5/18/2024  CONCLUSION:  1. There is 9.9 cm right perinephric hematoma with slight extension of hemorrhage into the right retroperitoneal space.  This finding was reported to KELECHI Talley on May 18, 2024 at 2:00 p.m.. 2. There is postoperative change associated with the bladder which  could represent surgical augmentation of bladder or neobladder creation.  Correlation with specific surgical history is necessary.  There is a Rodriguez catheter within the surgically altered  bladder. 3. There is mild bilateral hydronephrosis.  There is also air in the right renal collecting system probably related to the prior surgery. 4. There are small bilateral pleural effusions.   LOCATION:  [facility]   Dictated by (CST): Scottie Allred MD on 5/18/2024 at 1:55 PM     Finalized by (CST): Scottie Allred MD on 5/18/2024 at 2:04 PM       CT BRAIN OR HEAD (75118)    Result Date: 5/18/2024  CONCLUSION: 1. No acute intracranial findings 2. Chronic microvascular ischemic changes    LOCATION:  [facility]   Dictated by (CST): Quintin Rossi MD on 5/18/2024 at 1:59 PM     Finalized by (CST): Quintin Rossi MD on 5/18/2024 at 2:00 PM       US ABDOMEN COMPLETE (CPT=76700)    Result Date: 5/18/2024  CONCLUSION:  1. Mild to moderate bilateral hydronephrosis is noted similar to prior CT scan. 2. There is echogenic debris within gallbladder lumen consistent with gallbladder sludge.  There is otherwise no specific evidence of cholecystitis. 3. Complicated lesions in both kidneys described on CT are not identified on ultrasound.  Follow-up dedicated three-phase CT or MRI of kidneys should be considered.   LOCATION:  [facility]    Dictated by ([facility]): Scottie Allred MD on 5/18/2024 at 8:43 AM     Finalized by (CST): Scottie Allred MD on 5/18/2024 at 8:46 AM          Meds:      Vancomycin IV  1 each Intravenous See Admin Instructions (RX holding)    insulin aspart  2-10 Units Subcutaneous TID CC and HS      lactated ringers 100 mL/hr at 05/20/24 0422         diphenhydrAMINE    LORazepam    HYDROcodone-acetaminophen    acetaminophen    glucose **OR** glucose **OR** glucose-vitamin C **OR** dextrose **OR** glucose **OR** glucose **OR** glucose-vitamin C    ondansetron    prochlorperazine    polyethylene glycol (PEG 3350)    sennosides    bisacodyl     fleet enema    melatonin       Assessment/Plan:     Principal Problem:    Sepsis, due to unspecified organism, unspecified whether acute organ dysfunction present (HCC)  Active Problems:    Anemia    Azotemia    Leukocytosis    Hyperglycemia    Metabolic acidosis    Patient is a 59 year old male with PMH sig for bladder cancer status post neobladder creation in March, nonischemic cardiomyopathy chronic low back pain status post spinal stimulator, pulmonary sarcoidosis who presents for evaluation of 1 week of generalized weakness, some shortness of breath, cough.  Patient managed for septic shock secondary to MRSA bacteremia.     Septic shock- resolved  MRSA bacteremia - ?etiology, carbuncle or given recent surgery for neobladder at [facility]  - IV pressors, wean as tolerated  - can dc IVF  - Lactate within normal range  - 2D echo-no evidence of endocarditis  - Empiric cefepime and azithromycin, added vancomycin - per ID   - Blood cultures 2 out of 2 positive for MRSA, urine cultures neg, repeat blood cultures- NGTD  - ID on consult, recs reviewed  - ICU management - stable for transfer  - abdominal pain dizziness, abdominal US pending r/o GB etiology - no biliary source  - repeat CT today with no active extravasation, monitor clinically     R perinephric/Retroperitoneal hematoma, 9.9cm   Acute on chronic anemia  - H/H q8h  - monitor for active bleeding  - goal Hgb>7, transfuse PRN  - may need IR embolization if active bleeding  - await further urology recommendations - monitor for now   - hold all AC/antiplatelets     UTI  - Abnormal UA, cx neg  - CT evidence of hydronephrosis, bilateral  - In the setting of recent neobladder creation  - Continue IV cefepime, vanc per ID   - Urology on consult, appreciate input  - Maintain Rodriguez    Carbuncle, R suprapubic region  - s/p bedside I&D by urology  - cx with MRSA    ELENA- resolved  - In the setting of UTI and sepsis  - Follow BMP  - will resume LR in setting of  anemia/hypovolemia and mild Cr elevation     History of bladder cancer  - Status post recent neobladder creation at [facility] in March 2024  - Follows with [facility] urologist     History of pulmonary sarcoidosis  - Multiple nodules on CT chest, unclear if infection versus sarcoid related  - Plan to treat with IV antibiotics for now, may need outpatient monitoring with imaging  - Has chronic shortness of breath, currently bacteremic no indication for steroids at this time     Nonischemic cardiomyopathy  - Echo without any vegetation, normal EF  - Can resume aspirin and statin once BP/Hgb stabilizes      Hypertension  - Hold antihypertensives until BP improves  - holding amlodipine, valsartan     Hyperlipidemia  - Hold statin for now     Acidosis, nongap - resolving   - Presenting bicarb 10, in the setting of sepsis     Type 2 diabetes with hyperglycemia  - Hold home insulin  - Accu-Cheks, increased to medium dose ISS, may need long-acting coverage when appetite improves     FN:  - IVF: per ICU   - Diet: Diabetic     DVT Prophy: SCDs, heparin subcu (held for RP hematoma)   Atrophy: Ambulate as tolerated  Lines: PIV     Dispo: monitor   Outpatient records or previous hospital records reviewed.      Further recommendations pending patient's clinical course.  [facility] hospitalist to continue to follow patient while in house     Patient and/or patient's family given opportunity to ask questions and note understanding and agreeing with therapeutic plan as outlined     Thank You,    Edmundo Nur MD    [facility]  Answering Service number: [phone]

## 2024-05-20 NOTE — PROGRESS NOTES
Pharmacy Dosing Service  Follow-up Pharmacokinetic Consult for Vancomycin Dosing          Yunior Garrett is a 59 year old male who is being treated for MRSA bacteremia & groin carbuncle.       Vancomycin (5/15-      Est CrCl: >50 mL/min    Pharmacokinetic target: Trough/random ~15 mg/L    Vancomycin random:  13.6ug/mL (~26hr post-dose)        A/P:  1.  The vancomycin random is therapeutic.  Will change vancomycin to 1000mg q24hr based on ~24hr level & renal fx..    2.  Will re-check a vancomycin trough prior to the 3rd new dose.      3.  Pharmacy will need SCr daily while on vancomycin to assess renal function.      Pharmacy will continue to follow him and adjust dosing as appropriate.        Valdez Mccauley, PharmD, BCPS  5/20/2024  3:18 PM  [facility]: [phone]

## 2024-05-20 NOTE — PROGRESS NOTES
Infectious Disease Progress Note      Date of admission: 5/14/2024  4:43 AM     Reason for consult: MRSA bacteremia    Subjective: Feels better compared to when he first came in.  No abdominal pain.  No nausea or vomiting.  No diarrhea.  No shortness of breath.  No cough or sputum production.    The rest of the systems were reviewed and found to be negative except was mentioned above    Interval events: This is a 59-year-old male patient with history of bladder cancer, recently underwent a cystectomy with neobladder formation on 3/14/2024.  He also has a history of sarcoidosis for which she sees [facility].  He presents here with septic shock in the setting of MRSA bacteremia.  Number was complicated by right perinephric hematoma.  Evaluated by urology, Jennifer in place.  Plan for transfer to [facility].    Medications:    lactated ringers    Vancomycin IV    diphenhydrAMINE    LORazepam    insulin aspart    HYDROcodone-acetaminophen    acetaminophen    glucose **OR** glucose **OR** glucose-vitamin C **OR** dextrose **OR** glucose **OR** glucose **OR** glucose-vitamin C    ondansetron    prochlorperazine    polyethylene glycol (PEG 3350)    sennosides    bisacodyl    fleet enema    melatonin     Allergies:  No Known Allergies    Physical Exam:  Vitals:    05/20/24 0417   BP: 122/63   Pulse: 70   Resp: 20   Temp: 98.5 °F (36.9 °C)     Vitals signs and nursing note reviewed.   Constitutional:       Appearance: Normal appearance.   HENT:      Head: Normocephalic and atraumatic.      Mouth: Mucous membranes are moist.   Neck:      Musculoskeletal: Neck supple.   Cardiovascular:      Rate and Rhythm: Normal rate.   Pulmonary:      Effort: Pulmonary effort is normal. No respiratory distress.   Abdominal:      General: Abdomen is flat. There is no distension.      Palpations: Abdomen is soft. There is no mass.      Tenderness: There is mild lower abdominal tenderness. There is no guarding or rebound.      Hernia: No hernia is  present.   Musculoskeletal:      Right lower leg: No edema.      Left lower leg: No edema.   Skin:     General: Skin is warm and dry.   Neurological:      General: No focal deficit present.      Mental Status: Alert and oriented to person, place, and time.       Laboratory data:  I have reviewed all the lab results independently.  Lab Results   Component Value Date    WBC 8.3 05/19/2024    HGB 6.8 05/20/2024    HCT 21.9 05/20/2024    .0 05/19/2024    CREATSERUM 1.43 05/19/2024    BUN 19 05/19/2024     05/19/2024    K 3.7 05/19/2024     05/19/2024    CO2 27.0 05/19/2024     05/19/2024    CA 8.2 05/19/2024    ALB 1.7 05/19/2024    ALKPHO 107 05/19/2024    BILT 0.4 05/19/2024    TP 6.7 05/19/2024    AST 31 05/19/2024    ALT 35 05/19/2024      Recent Labs   Lab 05/19/24  0752 05/19/24  1653 05/20/24  0031   RBC 2.88*  --   --    HGB 7.5*   < > 6.8*   HCT 23.9*   < > 21.9*   MCV 83.0  --   --    MCH 26.0  --   --    MCHC 31.4  --   --    RDW 17.1  --   --    NEPRELIM 6.83  --   --    WBC 8.3  --   --    .0  --   --     < > = values in this interval not displayed.      Microbiology data:  Hospital Encounter on 05/14/24   1. Blood Culture     Status: None (Preliminary result)    Collection Time: 05/16/24  9:18 PM    Specimen: Blood,peripheral   Result Value Ref Range    Blood Culture Result No Growth 3 Days N/A   2. Aerobic Bacterial Culture     Status: Abnormal    Collection Time: 05/16/24  5:29 PM    Specimen: perineal; Other   Result Value Ref Range    Aerobic Culture Result 2+ growth Staphylococcus aureus, MRSA (A) N/A    Aerobic Smear 3+ WBCs seen N/A    Aerobic Smear 2+ Gram Positive Cocci N/A       Susceptibility    Staphylococcus aureus, MRSA -  (no method available)     Cefazolin  Resistant      Clindamycin <=0.25 Sensitive      Erythromycin >=8 Resistant      Gentamicin <=0.5 Sensitive      Levofloxacin 4 Resistant      Oxacillin >=4 Resistant      Tetracycline <=1 Sensitive       Trimethoprim/Sulfa <=10 Sensitive      Vancomycin 1 Sensitive    3. Urine Culture, Routine     Status: None    Collection Time: 05/16/24  1:25 PM    Specimen: Urine, cardona catheter   Result Value Ref Range    Urine Culture No Growth at 18-24 hrs. N/A     Impression:  Yunior Garrett is a 59 year old male with    MRSA sepsis/bacteremia with risk for life  Most likely source is his neobladder in the setting of hydronephrosis  Now Cardona catheter in place  Cleared his bacteremia on 5/15  TTE on 5/15 without endocarditis  Currently on IV vancomycin  Right perinephric hematoma noted on CT on 5/18  Urology following  Doubt this infected at this point; however, it could get secondarily infected in the future given his bacteremia  Plan to transfer to [facility] for higher level of care      Recommendations:    Continue IV vancomycin for now  Agree with transfer to [facility] given complicated urological history  Continue to monitor daily labs for antibiotic toxicities  Further recommendations will depend on the above work-up and clinical progress     The plan of care was discussed with the primary hospital team, Fahad Nur*     Recommendations were also discussed with the patient; all questions were answered.     Thank you for this consultation. Please don't hesitate to call the ID team for questions or any acute changes in patient's clinical condition.    Please note that this report has been produced using speech recognition software and may contain errors related to that system including, but not limited to, errors in grammar, punctuation, and spelling, as well as words and phrases that possibly may have been recognized inappropriately.  If there are any questions or concerns, contact the dictating provider for clarification.    The 21st Century Cures Act makes medical notes like these available to patients in the interest of transparency. Please be advised this is a medical document. Medical documents are intended  to carry relevant information, facts as evident, and the clinical opinion of the practitioner. The medical note is intended as peer to peer communication and may appear blunt or direct. It is written in medical language and may contain abbreviations or verbiage that are unfamiliar.     Lana Sellers MD  [facility]. Tel: [phone]. Fax: [phone].     Yunior Garrett : 1965 MRN: [id number] [facility]: [id number]

## 2024-05-20 NOTE — PHYSICAL THERAPY NOTE
PHYSICAL THERAPY TREATMENT NOTE - INPATIENT    Room Number: 378/378-A     Session: 1/5     Number of Visits to Meet Established Goals: 5    Presenting Problem: Septic shock, secondary to UT, and possible multifocal pneumonia, UTI  Co-Morbidities : bladder cancer status post neobladder creation in March, nonischemic cardiomyopathy chronic low back pain status post spinal stimulator, pulmonary sarcoidosis    HOME SITUATION  Type of Home: House   Home Layout: Two level  Stairs to Enter : 2  Stairs to Bedroom: 14     Lives With: Spouse  Patient Owned Equipment:  (tri walker and scooter)     Prior Level of [place]: Prior to admission, patient's baseline is mod I in his mobilities and is amb with a tri-wheeled walker and uses a scooter in longer distances.     ASSESSMENT   Patient demonstrates fair progress this session, goals  remain in progress.    Patient continues to function below baseline with bed mobility, transfers, gait, and stair negotiation.  Contributing factors to remaining limitations include decreased functional strength, decreased endurance/aerobic capacity, pain, impaired static and dynamic standing balance, impaired coordination, impaired motor planning, decreased muscular endurance, and medical status.  Next session anticipate patient to progress transfers, gait, and stair negotiation.  Physical Therapy will continue to follow patient for duration of hospitalization.    Patient continues to benefit from continued skilled PT services: at discharge to promote functional independence and safety with additional support and return home with home health PT.    PLAN  PT Treatment Plan: Bed mobility;Body mechanics;Patient education;Gait training;Strengthening;Stair training;Transfer training;Balance training;Range of motion;Family education  Rehab Potential : Good  Frequency (Obs): 3-5x/week    CURRENT GOALS     Goal #1 Patient is able to demonstrate supine - sit EOB @ level: supervision      Goal #2  Patient is able to demonstrate transfers Sit to/from Stand at assistance level: supervision      Goal #3 Patient is able to ambulate 150 feet with assist device: walker - rolling at assistance level: supervision      Goal #4 Asc/desc 1 flight of stairs with 1 flight of stairs      Goal #5 Pt will be ind/mod I in HEP for B LE while seated/supine      Goal #6     Goal Comments: Goals established on 2024 all goals ongoing     SUBJECTIVE  \"I am dizzy. I don't want to fall out of bed again.\"    OBJECTIVE       WEIGHT BEARING RESTRICTION  Weight Bearing Restriction: None                PAIN ASSESSMENT   Ratin  Location: abdomen  Management Techniques: Activity promotion;Body mechanics;Repositioning    BALANCE                                                                                                                       Static Sitting: Fair +  Dynamic Sitting: Fair           Static Standing: Fair -  Dynamic Standing: Fair -    ACTIVITY TOLERANCE                         O2 WALK         AM-PAC '6-Clicks' INPATIENT SHORT FORM - BASIC MOBILITY  How much difficulty does the patient currently have...  Patient Difficulty: Turning over in bed (including adjusting bedclothes, sheets and blankets)?: None   Patient Difficulty: Sitting down on and standing up from a chair with arms (e.g., wheelchair, bedside commode, etc.): A Little   Patient Difficulty: Moving from lying on back to sitting on the side of the bed?: A Little   How much help from another person does the patient currently need...   Help from Another: Moving to and from a bed to a chair (including a wheelchair)?: A Little   Help from Another: Need to walk in hospital room?: A Little   Help from Another: Climbing 3-5 steps with a railing?: A Lot       AM-PAC Score:  Raw Score: 18   Approx Degree of Impairment: 46.58%   Standardized Score (AM-PAC Scale): 43.63   CMS Modifier (G-Code): CK    FUNCTIONAL ABILITY STATUS  Gait Assessment   Functional  Mobility/Gait Assessment  Gait Assistance: Not tested  Distance (ft): 1  Assistive Device: Rolling walker  Pattern: Shuffle    Skilled Therapy Provided    Bed Mobility:  Rolling: rolled to R side indep   Supine<>Sit: [place] w/ HOB flat   Sit<>Supine: NT     Transfer Mobility:  Sit<>Stand: CGA to RW- v/c for hand placement   Stand<>Sit: CGA   Gait: NT    Therapist's Comments:   Patient presents sitting up in bed. Spouse present in room. Discussed goal fo today's session. Pt in agreement. Reports significant dizziness even when sitting up in bed- BP WNL. Bed mobility independently. Sit to stand CGA to RW- v/c for hand placement. Pt only wanting to take steps to bedside chair with RW- CGA. Pt's mobility limited this session due to severe dizziness. RN aware.      Patient End of Session: Up in chair;Needs met;Call light within reach;RN aware of session/findings;All patient questions and concerns addressed;Alarm set;Family present;Discussed recommendations with /    PT Session Time: 15 minutes  Therapeutic Activity: 15 minutes

## 2024-05-20 NOTE — PLAN OF CARE
Received report from ICU RN for transfer. Arrived via transport in bed. Oriented to room, spouse at bedside. Encourage to use call light. Respiratory called for CPAP machine.    AxO4. VSS on RA. GRACIA w/ CPAP. On tele-NSR. LR @ 100. See MAR for blood glucose control - on QID accuchecks. Denies nausea on arrival, c/o pain to abdomen, RQ > LQ - PRN norco for relief. LBM 5/18. Rodriguez intact and patent w/ yellow urine w/ mucous shreds draining. Manually irrigated w/ saline - ordered TID and PRN. On IV vanco - pharmacy to dose. Up sb. Pending CT abd/pelv if creatinine ok. Pt and spouse updated on POC. Safety measures placed. Care ongoing.

## 2024-05-20 NOTE — OCCUPATIONAL THERAPY NOTE
OCCUPATIONAL THERAPY EVALUATION - INPATIENT     Room Number: 378/378-A  Evaluation Date: 5/20/2024  Type of Evaluation: Initial  Presenting Problem: fatigue, weakness, fall, anemia, UTI, ELENA, septic shock, PNA    Physician Order: IP Consult to Occupational Therapy  Reason for Therapy: ADL/IADL Dysfunction and Discharge Planning    OCCUPATIONAL THERAPY ASSESSMENT   Patient is currently functioning near baseline with toileting, bathing, lower body dressing, transfers, and dynamic standing balance. Prior to admission, patient's baseline is independent.  Patient is requiring  grossly CGA for standing ADLs and transfers  as a result of the following impairments: decreased endurance, pain, impaired standing balance, and dizziness . Occupational Therapy will continue to follow for duration of hospitalization.    Patient will benefit from continued skilled OT Services For duration of hospitalization, however, given the patient is functioning near baseline level do not anticipate skilled therapy needs at discharge       History Related to Current Admission: Patient is a 59 year old male admitted on 5/14/2024 with Presenting Problem: fatigue, weakness, fall, anemia, UTI, ELENA, septic shock, PNA. Patient in ICU 5/14-5/19/24. Co-Morbidities : bladder cancer status post neobladder creation in March, nonischemic cardiomyopathy chronic low back pain status post spinal stimulator, pulmonary sarcoidosis    WEIGHT BEARING RESTRICTION  Weight Bearing Restriction: None                Recommendations for nursing staff:   Transfers: 1-person  Toileting location: commode if remains dizzy, toilet as tolerated    EVALUATION SESSION:  Patient Start of Session: supine  FUNCTIONAL TRANSFER ASSESSMENT  Sit to Stand: Edge of Bed  Edge of Bed: Contact Guard Assist  Commode Transfer: Contact Guard Assist (simulated at bedside chair (uses 3-in-1 commode over toilet at home) CGA)    BED MOBILITY  Supine to Sit : Modified Independent (HOB  flat)  Scooting: Modified Independent    BALANCE ASSESSMENT     FUNCTIONAL ADL ASSESSMENT  LB Dressing Seated: Supervision (sufficient reach BLE without AE via elevated cross-leg method, discussed recommendation of this technique rather than bending forward due to dizziness)  LB Dressing Standing: Contact Guard Assist (simulated clothing management to/from waist with no LOB)  Toileting Standing: Contact Guard Assist (simulated clothing management to/from waist with no LOB)      ACTIVITY TOLERANCE:            BP: 147/66  BP Location: Left arm  BP Method: Automatic  Patient Position: Sitting    O2 SATURATIONS       COGNITION  Overall Cognitive Status:  WFL - within functional limits    Upper Extremity   ROM: within functional limits   Strength: within functional limits     EDUCATION PROVIDED  Patient: Role of Occupational Therapy; Functional Transfer Techniques; Compensatory ADL Techniques; Fall Prevention  Patient's Response to Education: Verbalized Understanding; Requires Further Education (wife present part of session)    Equipment used: RW  Demonstrates functional use, Would benefit from additional trial      Therapist comments: Patient agreeable to therapy with encouragement, reporting limited primarily by dizziness this session, also some nausea and abdominal pain. Patient grossly SBA to CGA for ADLs and transfers this session, anticipate will progress to SBA to Mod I by time of discharge; wife reports patient has someone with him at all times at home. Patient reporting severe dizziness, BP WNL, able to get to bedside chair with RW and CGA. Will continue to follow.     Patient End of Session: Needs met;Call light within reach;Up in chair;RN aware of session/findings;All patient questions and concerns addressed;Alarm set;Family present    OCCUPATIONAL PROFILE    HOME SITUATION  Type of Home: House  Home Layout: Two level  Lives With: Spouse (sister lives nearby, stays with patient when wife is at work)    Toilet  and Equipment: Standard height toilet;3-in-1 commode (reports commode over toilet at all times)  Shower/Tub and Equipment: Walk-in shower;Shower chair  Other Equipment: None             Patient Regularly Uses: Reading glasses    Prior Level of Function: Patient reports independent in all BADLs and functional mobility via tri-wheeled walker short distances, scooter for longer distances prior to admission. Patient always has supervision between wife and his sister, who lives nearby and comes to assist when wife is at work.    SUBJECTIVE   \"The dizziness is at a 10 out of 10\"    PAIN ASSESSMENT  Rating: Unable to rate  Location: abdomen  Management Techniques: Repositioning;Relaxation    OBJECTIVE  Precautions: Bed/chair alarm  Fall Risk: High fall risk      ASSESSMENTS    AM-PAC ‘6-Clicks’ Inpatient Daily Activity Short Form  -   Putting on and taking off regular lower body clothing?: A Little (CGA)  -   Bathing (including washing, rinsing, drying)?: A Little (CGA)  -   Toileting, which includes using toilet, bedpan or urinal? : A Little (CGA)  -   Putting on and taking off regular upper body clothing?: None  -   Taking care of personal grooming such as brushing teeth?: None  -   Eating meals?: None    AM-PAC Score:  Score: 21  Approx Degree of Impairment: 32.79%  Standardized Score (AM-PAC Scale): 44.27    ADDITIONAL TESTS     NEUROLOGICAL FINDINGS      COGNITION ASSESSMENTS       PLAN  OT Treatment Plan: Balance activities;Energy conservation/work simplification techniques;ADL training;Functional transfer training;Endurance training;Patient/Family education;Patient/Family training;Compensatory technique education  Rehab Potential : Good  Frequency: 3x/week  Number of Visits to Meet Established Goals: 3    ADL GOALS   Patient will perform lower body dressing with supervision  Patient will perform toileting with supervision    FUNCTIONAL TRANSFER GOALS   Patient will perform sit to supine with supervision  Patient  will transfer to toilet (or commode, pending dizziness) with supervision    Patient Evaluation Complexity Level:   Occupational Profile/Medical History LOW - Brief history including review of medical or therapy records    Specific performance deficits impacting engagement in ADL/IADL LOW  1 - 3 performance deficits    Client Assessment/Performance Deficits LOW - No comorbidities nor modifications of tasks    Clinical Decision Making LOW - Analysis of occupational profile, problem-focused assessments, limited treatment options    Overall Complexity LOW     OT Session Time: 15 minutes

## 2024-05-20 NOTE — PROGRESS NOTES
[facility]  Urology Progress Note    Yunior Garrett Patient Status:  Inpatient    1965 MRN [id number]   Location [facility] 3SW-A Attending Fahad Nur*   Hosp Day # 6 PCP Antione Valadez,      Subjective:  Yunior Garrett is a(n) 59 year old male.    Current complaints: +nausea.  No vomiting.  Tmax 99.9, most recent temp 99.3.  No chills.  +abdominal pain - rate pain 6 out of 10 on pain scale.     Objective:  General appearance: alert, appears stated age, and cooperative  Blood pressure 127/64, pulse 79, temperature 99.3 °F (37.4 °C), temperature source Oral, resp. rate 16, height 5' 5\" (1.651 m), weight 164 lb 14.4 oz (74.8 kg), SpO2 95%.  Lungs: non-labored respirations  Abdomen: +tenderness  RLQ abscess site incision C/D/I.  No pain  : cardona catheter in place (UOP - 3450 mL/24 hours)  Lab Results   Component Value Date    WBC 10.3 2024    HGB 8.1 2024    HGB 8.2 2024    HGB 8.4 2024    HCT 25.6 2024    HCT 25.8 2024    .0 2024    CREATSERUM 1.23 2024    BUN 16 2024     2024    K 4.0 2024    K 4.0 2024     2024    CO2 27.0 2024     2024    CA 8.0 2024    ALB 1.6 2024    ALKPHO 107 2024    BILT 0.4 2024    TP 6.5 2024    AST 25 2024    ALT 32 2024    PGLU 136 2024       Hospital Encounter on 24   1. Blood Culture     Status: None (Preliminary result)    Collection Time: 24  9:18 PM    Specimen: Blood,peripheral   Result Value Ref Range    Blood Culture Result No Growth 3 Days N/A   2. Aerobic Bacterial Culture     Status: Abnormal    Collection Time: 24  5:29 PM    Specimen: perineal; Other   Result Value Ref Range    Aerobic Culture Result 2+ growth Staphylococcus aureus, MRSA (A) N/A    Aerobic Smear 3+ WBCs seen N/A    Aerobic Smear 2+ Gram Positive Cocci N/A       Susceptibility     Staphylococcus aureus, MRSA -  (no method available)     Cefazolin  Resistant      Clindamycin <=0.25 Sensitive      Erythromycin >=8 Resistant      Gentamicin <=0.5 Sensitive      Levofloxacin 4 Resistant      Oxacillin >=4 Resistant      Tetracycline <=1 Sensitive      Trimethoprim/Sulfa <=10 Sensitive      Vancomycin 1 Sensitive    3. Urine Culture, Routine     Status: None    Collection Time: 05/16/24  1:25 PM    Specimen: Urine, cardona catheter   Result Value Ref Range    Urine Culture No Growth at 18-24 hrs. N/A        CT ABDOMEN+PELVIS(CPT=74176)    Result Date: 5/18/2024  CONCLUSION:  1. There is 9.9 cm right perinephric hematoma with slight extension of hemorrhage into the right retroperitoneal space.  This finding was reported to KELECHI Talley on May 18, 2024 at 2:00 p.m.. 2. There is postoperative change associated with the bladder which could represent surgical augmentation of bladder or neobladder creation.  Correlation with specific surgical history is necessary.  There is a Cardona catheter within the surgically altered  bladder. 3. There is mild bilateral hydronephrosis.  There is also air in the right renal collecting system probably related to the prior surgery. 4. There are small bilateral pleural effusions.   LOCATION:  [facility]   Dictated by (CST): Scottie Allred MD on 5/18/2024 at 1:55 PM     Finalized by (CST): Scottie Allred MD on 5/18/2024 at 2:04 PM       CT BRAIN OR HEAD ([id number])    Result Date: 5/18/2024  CONCLUSION: 1. No acute intracranial findings 2. Chronic microvascular ischemic changes    LOCATION:  [facility]   Dictated by (CST): Quintin Rossi MD on 5/18/2024 at 1:59 PM     Finalized by (CST): Quintin Rossi MD on 5/18/2024 at 2:00 PM         Assessment:    Septic shock  Bacteremia  Bilateral hydronephrosis/hydroureter  ELENA     Tmax 101.8 on 5/14, Tmax 102  on 5/15, Tmax 103 on 5/16,   Tmax 101.7 on 5/17, Tmax 100.9 on 5/18, Tmax 99.9, most recent temp 99.3  WBC 23.3 > 19.8 > 10.3 > 6.4 >  7.1 > 8.3 > 7.5 > 8.3 > 10.3  Serum creat 1.84 > 1.43 > 1.45 > 1.26 > 1.34 > 1.23 > 0.94 > 1.2 > 1.64 > 1.43 > 1.23   UCx neg  2/2 Blood culture 5/14/24: Staph aureus MRSA  2/2 blood cultures 5/15/24: prelim no growth after 4 days  2/2 blood cultures 5/16/24:prelim no growth after 3 days     History of BCG unresponsive T1/Tcis bladder cancer   Status post radical cystectomy orthotopic neobladder on 3/14/24.      Status-post I and D of SP carbuncle 5/16/24 with Dr. Poon  Cx with 2+ Staph aureus MRSA     Right perinephric hematoma  -5/18/24 CT reviewed and compared back to previous - possible small underlying mass that bled? Doubt related to a cyst     Plan:    Check final cultures  Abx per ID  Follow labs, temp, UOP  CPM with cardona catheter.  RN to hand irrigate TID and prn  CT urogram today  Agree with transfer to [facility] when bed available given recent cystectomy history     Above discussed with Dr. Cleve Griffiths PA-C  [facility]  Department of Urology  5/20/2024  9:48 AM

## 2024-05-21 ENCOUNTER — APPOINTMENT (OUTPATIENT)
Facility: HOSPITAL | Age: 59
DRG: 871 | End: 2024-05-21
Attending: NURSE PRACTITIONER

## 2024-05-21 LAB
ALBUMIN SERPL-MCNC: 1.7 G/DL (ref 3.4–5)
ALBUMIN/GLOB SERPL: 0.3 {RATIO} (ref 1–2)
ALP LIVER SERPL-CCNC: 108 U/L
ALT SERPL-CCNC: 36 U/L
ANION GAP SERPL CALC-SCNC: 5 MMOL/L (ref 0–18)
AST SERPL-CCNC: 29 U/L (ref 15–37)
BASOPHILS # BLD AUTO: 0.02 X10(3) UL (ref 0–0.2)
BASOPHILS NFR BLD AUTO: 0.2 %
BILIRUB SERPL-MCNC: 0.4 MG/DL (ref 0.1–2)
BLOOD TYPE BARCODE: 7300
BUN BLD-MCNC: 14 MG/DL (ref 9–23)
CALCIUM BLD-MCNC: 8.3 MG/DL (ref 8.5–10.1)
CHLORIDE SERPL-SCNC: 102 MMOL/L (ref 98–112)
CO2 SERPL-SCNC: 27 MMOL/L (ref 21–32)
CREAT BLD-MCNC: 1.18 MG/DL
EGFRCR SERPLBLD CKD-EPI 2021: 70 ML/MIN/1.73M2 (ref 60–?)
EOSINOPHIL # BLD AUTO: 0.25 X10(3) UL (ref 0–0.7)
EOSINOPHIL NFR BLD AUTO: 2.8 %
ERYTHROCYTE [DISTWIDTH] IN BLOOD BY AUTOMATED COUNT: 16.6 %
GLOBULIN PLAS-MCNC: 4.9 G/DL (ref 2.8–4.4)
GLUCOSE BLD-MCNC: 137 MG/DL (ref 70–99)
GLUCOSE BLD-MCNC: 150 MG/DL (ref 70–99)
GLUCOSE BLD-MCNC: 157 MG/DL (ref 70–99)
GLUCOSE BLD-MCNC: 220 MG/DL (ref 70–99)
GLUCOSE BLD-MCNC: 303 MG/DL (ref 70–99)
HCT VFR BLD AUTO: 25.5 %
HCT VFR BLD AUTO: 26 %
HCT VFR BLD AUTO: 30.7 %
HGB BLD-MCNC: 8.3 G/DL
HGB BLD-MCNC: 8.3 G/DL
HGB BLD-MCNC: 9.3 G/DL
IMM GRANULOCYTES # BLD AUTO: 0.23 X10(3) UL (ref 0–1)
IMM GRANULOCYTES NFR BLD: 2.6 %
LYMPHOCYTES # BLD AUTO: 0.67 X10(3) UL (ref 1–4)
LYMPHOCYTES NFR BLD AUTO: 7.5 %
MCH RBC QN AUTO: 26.5 PG (ref 26–34)
MCHC RBC AUTO-ENTMCNC: 31.9 G/DL (ref 31–37)
MCV RBC AUTO: 83.1 FL
MONOCYTES # BLD AUTO: 0.71 X10(3) UL (ref 0.1–1)
MONOCYTES NFR BLD AUTO: 7.9 %
NEUTROPHILS # BLD AUTO: 7.1 X10 (3) UL (ref 1.5–7.7)
NEUTROPHILS # BLD AUTO: 7.1 X10(3) UL (ref 1.5–7.7)
NEUTROPHILS NFR BLD AUTO: 79 %
OSMOLALITY SERPL CALC.SUM OF ELEC: 281 MOSM/KG (ref 275–295)
PLATELET # BLD AUTO: 223 10(3)UL (ref 150–450)
POTASSIUM SERPL-SCNC: 3.9 MMOL/L (ref 3.5–5.1)
PROT SERPL-MCNC: 6.6 G/DL (ref 6.4–8.2)
RBC # BLD AUTO: 3.13 X10(6)UL
SODIUM SERPL-SCNC: 134 MMOL/L (ref 136–145)
UNIT VOLUME: 350 ML
WBC # BLD AUTO: 9 X10(3) UL (ref 4–11)

## 2024-05-21 PROCEDURE — B548ZZA ULTRASONOGRAPHY OF SUPERIOR VENA CAVA, GUIDANCE: ICD-10-PCS | Performed by: HOSPITALIST

## 2024-05-21 PROCEDURE — 02HV33Z INSERTION OF INFUSION DEVICE INTO SUPERIOR VENA CAVA, PERCUTANEOUS APPROACH: ICD-10-PCS | Performed by: HOSPITALIST

## 2024-05-21 RX ORDER — LIDOCAINE HYDROCHLORIDE 10 MG/ML
5 INJECTION, SOLUTION EPIDURAL; INFILTRATION; INTRACAUDAL; PERINEURAL
Status: COMPLETED | OUTPATIENT
Start: 2024-05-21 | End: 2024-05-21

## 2024-05-21 RX ORDER — VANCOMYCIN/0.9 % SOD CHLORIDE 1 G/100 ML
1 PLASTIC BAG, INJECTION (ML) INTRAVENOUS EVERY 24 HOURS
Qty: 21 EACH | Refills: 0 | Status: SHIPPED | OUTPATIENT
Start: 2024-05-21 | End: 2024-05-22

## 2024-05-21 RX ORDER — ROSUVASTATIN CALCIUM 20 MG/1
20 TABLET, COATED ORAL NIGHTLY
Status: DISCONTINUED | OUTPATIENT
Start: 2024-05-21 | End: 2024-05-22

## 2024-05-21 RX ORDER — LOSARTAN POTASSIUM 100 MG/1
100 TABLET ORAL DAILY
Status: DISCONTINUED | OUTPATIENT
Start: 2024-05-21 | End: 2024-05-22

## 2024-05-21 NOTE — DIETARY NOTE
[facility]   part of [facility]   CLINICAL NUTRITION    Yunior Garrett     Admitting diagnosis:  Metabolic acidosis [E87.20]  Sepsis, due to unspecified organism, unspecified whether acute organ dysfunction present (HCC) [A41.9]    Ht: 165.1 cm (5' 5\")  Wt: 74.4 kg (164 lb).   Body mass index is 27.29 kg/m².  IBW: 61.8 kg    Wt Readings from Last 6 Encounters:   05/21/24 74.4 kg (164 lb)   06/22/22 77.1 kg (170 lb)   04/05/22 79.4 kg (175 lb)   03/24/22 84.4 kg (186 lb)   03/08/22 80.3 kg (177 lb)   02/22/22 77.1 kg (170 lb)        Labs/Meds reviewed  -POC Glu:136-209, Glu:137, Na++:137  -IVF:LR at 100 ml/hr, Insulin, IV abx    Diet:       Procedures    Carbohydrate controlled diet 1800 kcal/60 grams; Is Patient on Accuchecks? Yes     Percent Meals Eaten (last 3 days)       Date/Time Percent Meals Eaten (%)    05/18/24 0900 85 %    05/18/24 1400 70 %    05/21/24 0827 100 %          Pt chart reviewed d/t length of stay.  Unable to interview pt at time of visit. Currently having PICC line placed.   Recorded PO intakes vary this admit:% and appear to be trending up.   Glucerna added BID on 5/17; at breakfast and dinner to help maximize nutrition intakes. Unable to determine how many ONS, pt is consuming per day at this time. Discussed ONS w/ RN.  No significant weight changes noted per EMR hx.    S/p I&D of suprapubic carbuncle on 5/16.   Pt found to have R perinephric/Retroperitoneal hematoma.     Waiting for transfer to [facility].     PMH:bladder CA, s/p neobladder creation in March, cardiomyopathy, pulmonary sarcoidosis, DM.      Patient is at low nutrition risk at this time.    Please consult if patient status changes or nutrition issues arise.    Bree Fong MS, RD, LDN  Clinical Dietitian  Ext:[phone]

## 2024-05-21 NOTE — PLAN OF CARE
A&oriented x4 . VSS. . IS encouraged. Telemetry monitoring. SCDs. Ankle pumps encouraged. Tolerating diet. PICC placed today Voiding per [facility]ons. Pain managed with po norco. mbulating with  assist. Plan is  ADRIANA DAMON with IV Abx. Patient updated and in agreement with plan of care. Safety precautions in place. Instructed patient to call for assistance, call light within reach.

## 2024-05-21 NOTE — PROGRESS NOTES
[facility] Hospitalist Progress Note     CC: Hospital Follow up    PCP: Antione Valadez DO       Subjective:     Patient seen and examined.  Abd pain stable.   Denies CP/SOB.  NAD.     OBJECTIVE:    Blood pressure 124/57, pulse 74, temperature 98.3 °F (36.8 °C), temperature source Oral, resp. rate 17, height 5' 5\" (1.651 m), weight 164 lb (74.4 kg), SpO2 95%.    Temp:  [98.3 °F (36.8 °C)-98.9 °F (37.2 °C)] 98.3 °F (36.8 °C)  Pulse:  [67-79] 74  Resp:  [16-18] 17  BP: (117-135)/(57-71) 124/57  SpO2:  [95 %-97 %] 95 %      Intake/Output:    Intake/Output Summary (Last 24 hours) at 5/21/2024 1116  Last data filed at 5/21/2024 0827  Gross per 24 hour   Intake 120 ml   Output 3550 ml   Net -3430 ml       Last 3 Weights   05/21/24 0624 164 lb (74.4 kg)   05/20/24 0515 164 lb 14.4 oz (74.8 kg)   05/18/24 0400 170 lb 6.7 oz (77.3 kg)   05/17/24 0400 171 lb 11.8 oz (77.9 kg)   05/15/24 0500 166 lb 3.6 oz (75.4 kg)   05/14/24 1149 160 lb 0.9 oz (72.6 kg)   05/14/24 1110 160 lb 0.9 oz (72.6 kg)   05/14/24 0454 174 lb (78.9 kg)   06/22/22 0645 170 lb (77.1 kg)   06/21/22 0831 169 lb (76.7 kg)   04/05/22 1450 175 lb (79.4 kg)       Exam   Gen: No acute distress, alert and oriented x3, no focal neurologic deficits, appears tired, central line in place  Heent: NC AT, mucous memb clear, neck supple  Pulm: Limited exam, diminished, supplemental oxygen,  CV: Heart with regular rate and rhythm, no peripheral edema  Abd: Abdomen soft, vaguely tender throughout the abdomen, nondistended, bowel sounds present  MSK: Moving all extremities spontaneously, gait not assessed  Skin: Carbuncle noted on the right suprapubic region that is very tender to touch  Neuro: AO*3, motor intact, no sensory deficits  Psyc: appropriate mood and affect      Data Review:       Labs:     Recent Labs   Lab 05/19/24  0752 05/19/24  1653 05/20/24  0538 05/20/24  1421 05/20/24  2352 05/21/24  0451 05/21/24  0807   RBC 2.88*  --  3.13*  --   --  3.13*  --    HGB 7.5*   <  > 8.4*  8.2*  8.1*   < > 8.3* 8.3* 9.3*   HCT 23.9*   < > 25.8*  25.6*   < > 25.5* 26.0* 30.7*   MCV 83.0  --  82.4  --   --  83.1  --    MCH 26.0  --  26.8  --   --  26.5  --    MCHC 31.4  --  32.6  --   --  31.9  --    RDW 17.1  --  16.7  --   --  16.6  --    NEPRELIM 6.83  --  8.34*  --   --  7.10  --    WBC 8.3  --  10.3  --   --  9.0  --    .0  --  183.0  --   --  223.0  --     < > = values in this interval not displayed.         Recent Labs   Lab 05/19/24 0752 05/20/24  0538 05/21/24  0451   * 134* 137*   BUN 19 16 14   CREATSERUM 1.43* 1.23 1.18   EGFRCR 56* 67 70   CA 8.2* 8.0* 8.3*   * 134* 134*   K 3.7 4.0  4.0 3.9    103 102   CO2 27.0 27.0 27.0       Recent Labs   Lab 05/17/24 0453 05/18/24 0227 05/19/24 0752 05/20/24  0538 05/21/24  0451   ALT 34 34 35 32 36   AST 25 31 31 25 29   ALB 1.8* 1.6* 1.7* 1.6* 1.7*         Imaging:  CT UROGRAM(W+WO) W/3D(CPT=74178/39270)    Result Date: 5/20/2024  CONCLUSION:  1. Stable right perinephric hematoma measuring approximately 9.1 x 3.5 cm.  No evidence for active extravasation is noted. 2. Moderate left-sided hydronephrosis without obstructing urolithiasis.  There is air within the right renal collecting system and ureter which may be secondary to postoperative changes. 3. Postoperative changes from cystectomy and neobladder creation. 4. Small bilateral pleural effusions with associated areas of consolidation.  There also nodular infiltrates noted within the lung bases which are nonspecific but infectious etiology cannot be excluded.     LOCATION:  [id number]   Dictated by (CST): Quintin Rossi MD on 5/20/2024 at 1:39 PM     Finalized by (CST): Quintin Rossi MD on 5/20/2024 at 1:45 PM       CT ABDOMEN+PELVIS(CPT=74176)    Result Date: 5/18/2024  CONCLUSION:  1. There is 9.9 cm right perinephric hematoma with slight extension of hemorrhage into the right retroperitoneal space.  This finding was reported to KELECHI Talley on May 18,  2024 at 2:00 p.m.. 2. There is postoperative change associated with the bladder which could represent surgical augmentation of bladder or neobladder creation.  Correlation with specific surgical history is necessary.  There is a Rodriguez catheter within the surgically altered  bladder. 3. There is mild bilateral hydronephrosis.  There is also air in the right renal collecting system probably related to the prior surgery. 4. There are small bilateral pleural effusions.   LOCATION:  [facility]   Dictated by (CST): Scottie Allred MD on 5/18/2024 at 1:55 PM     Finalized by (CST): Sctotie Allred MD on 5/18/2024 at 2:04 PM       CT BRAIN OR HEAD ([id number])    Result Date: 5/18/2024  CONCLUSION: 1. No acute intracranial findings 2. Chronic microvascular ischemic changes    LOCATION:  [facility]   Dictated by (CST): Quintin Rossi MD on 5/18/2024 at 1:59 PM     Finalized by (CST): Quintin Rossi MD on 5/18/2024 at 2:00 PM          Meds:      losartan  100 mg Oral Daily    rosuvastatin  20 mg Oral Nightly    vancomycin  15 mg/kg Intravenous Q24H    insulin aspart  2-10 Units Subcutaneous TID CC and HS             diphenhydrAMINE    LORazepam    HYDROcodone-acetaminophen    acetaminophen    glucose **OR** glucose **OR** glucose-vitamin C **OR** dextrose **OR** glucose **OR** glucose **OR** glucose-vitamin C    ondansetron    prochlorperazine    polyethylene glycol (PEG 3350)    sennosides    bisacodyl    fleet enema    melatonin       Assessment/Plan:     Principal Problem:    Sepsis, due to unspecified organism, unspecified whether acute organ dysfunction present (HCC)  Active Problems:    Anemia    Azotemia    Leukocytosis    Hyperglycemia    Metabolic acidosis    Patient is a 59 year old male with PMH sig for bladder cancer status post neobladder creation in March, nonischemic cardiomyopathy chronic low back pain status post spinal stimulator, pulmonary sarcoidosis who presents for evaluation of 1 week of generalized weakness, some  shortness of breath, cough.  Patient managed for septic shock secondary to MRSA bacteremia.     Septic shock- resolved  MRSA bacteremia - ?etiology, carbuncle or given recent surgery for neobladder at [facility]  - IV pressors, wean as tolerated  - can dc IVF  - Lactate within normal range  - 2D echo-no evidence of endocarditis  - Empiric cefepime and azithromycin, added vancomycin - per ID   - Blood cultures 2 out of 2 positive for MRSA, urine cultures neg, repeat blood cultures- NGTD  - ID on consult, recs reviewed - plan for PICC placement with 4w course through 6/11   - ICU management - stable for transfer  - abdominal pain dizziness, abdominal US pending r/o GB etiology - no biliary source  - repeat CT today with no active extravasation, monitor clinically     R perinephric/Retroperitoneal hematoma, 9.9cm   Acute on chronic anemia  - H/H q8h - check again this PM then regular monitoring   - monitor for active bleeding  - goal Hgb>7, transfuse PRN (s/p 4u PRBCs)   - hold all AC/antiplatelets   - no signs of active bleeding on CT     UTI  - Abnormal UA, cx neg  - CT evidence of hydronephrosis, bilateral  - In the setting of recent neobladder creation  - Continue IV cefepime, vanc per ID   - Urology on consult, appreciate input  - Maintain Rodriguez    Carbuncle, R suprapubic region  - s/p bedside I&D by urology  - cx with MRSA    ELENA- resolved  - In the setting of UTI and sepsis  - Follow BMP  - will resume LR in setting of anemia/hypovolemia and mild Cr elevation     History of bladder cancer  - Status post recent neobladder creation at [facility] in March 2024  - Follows with [facility] urologist     History of pulmonary sarcoidosis  - Multiple nodules on CT chest, unclear if infection versus sarcoid related  - Plan to treat with IV antibiotics for now, may need outpatient monitoring with imaging  - Has chronic shortness of breath, currently bacteremic no indication for steroids at this time     Nonischemic cardiomyopathy  - Echo  without any vegetation, normal EF  - Can resume aspirin and statin once BP/Hgb stabilizes      Hypertension  - Hold antihypertensives until BP improves  - holding amlodipine  - resume ARB today      Hyperlipidemia  - resume crestor      Acidosis, nongap - resolving   - Presenting bicarb 10, in the setting of sepsis     Type 2 diabetes with hyperglycemia  - Hold home insulin  - Accu-Cheks, increased to medium dose ISS, may need long-acting coverage when appetite improves     FN:  - IVF: dc   - Diet: Diabetic     DVT Prophy: SCDs, heparin subcu (held for RP hematoma)   Atrophy: Ambulate as tolerated  Lines: PIV     Dispo: hopefully dc tomorrow    Outpatient records or previous hospital records reviewed.      Further recommendations pending patient's clinical course.  [facility] hospitalist to continue to follow patient while in house     Patient and/or patient's family given opportunity to ask questions and note understanding and agreeing with therapeutic plan as outlined     Thank You,    Edmundo Odom Hospitalist  Answering Service number: [phone]

## 2024-05-21 NOTE — PROGRESS NOTES
Pt A&O x 4 , on RA , TELE-nsr, cardona intact- flush TID, IVF running, Pain controlled with current regimen,  scd's in place, intermittent blurred vision and dizziness,  up with min ast, iv abx, ivf running, QID accucheck,  per urology- tx to [facility] when bed available due to recent cystectomy?

## 2024-05-21 NOTE — PROGRESS NOTES
[facility]  Urology Progress Note    Yunior Garrett Patient Status:  Inpatient    1965 MRN [id number]   Location [facility] 3SW-A Attending Fahad Nur*   Hosp Day # 7 PCP Antione Valadez,      Subjective:  Yunior Garrett is a(n) 59 year old male.    Current complaints: No fever.  Pain about the same.  No fever.     Wife at bedside.      Objective:  General appearance: alert, appears stated age, and cooperative  Blood pressure 128/65, pulse 77, temperature 98.9 °F (37.2 °C), temperature source Oral, resp. rate 18, height 5' 5\" (1.651 m), weight 164 lb (74.4 kg), SpO2 96%.  Lungs: non-labored respirations  Abdomen: soft, tender  : cardona catheter in place draining yellow urine (UOP - 3550 mL/24 hours)  Lab Results   Component Value Date    WBC 9.0 2024    HGB 8.3 2024    HCT 26.0 2024    .0 2024    CREATSERUM 1.18 2024    BUN 14 2024     2024    K 3.9 2024     2024    CO2 27.0 2024     2024    CA 8.3 2024    ALB 1.7 2024    ALKPHO 108 2024    BILT 0.4 2024    TP 6.6 2024    AST 29 2024    ALT 36 2024    PGLU 150 2024       Hospital Encounter on 24   1. Blood Culture     Status: None (Preliminary result)    Collection Time: 24  9:18 PM    Specimen: Blood,peripheral   Result Value Ref Range    Blood Culture Result No Growth 4 Days N/A   2. Aerobic Bacterial Culture     Status: Abnormal    Collection Time: 24  5:29 PM    Specimen: perineal; Other   Result Value Ref Range    Aerobic Culture Result 2+ growth Staphylococcus aureus, MRSA (A) N/A    Aerobic Smear 3+ WBCs seen N/A    Aerobic Smear 2+ Gram Positive Cocci N/A       Susceptibility    Staphylococcus aureus, MRSA -  (no method available)     Cefazolin  Resistant      Clindamycin <=0.25 Sensitive      Erythromycin >=8 Resistant      Gentamicin <=0.5 Sensitive       Levofloxacin 4 Resistant      Oxacillin >=4 Resistant      Tetracycline <=1 Sensitive      Trimethoprim/Sulfa <=10 Sensitive      Vancomycin 1 Sensitive    3. Urine Culture, Routine     Status: None    Collection Time: 05/16/24  1:25 PM    Specimen: Urine, cardona catheter   Result Value Ref Range    Urine Culture No Growth at 18-24 hrs. N/A        CT UROGRAM(W+WO) W/3D(CPT=74178/84814)    Result Date: 5/20/2024  CONCLUSION:  1. Stable right perinephric hematoma measuring approximately 9.1 x 3.5 cm.  No evidence for active extravasation is noted. 2. Moderate left-sided hydronephrosis without obstructing urolithiasis.  There is air within the right renal collecting system and ureter which may be secondary to postoperative changes. 3. Postoperative changes from cystectomy and neobladder creation. 4. Small bilateral pleural effusions with associated areas of consolidation.  There also nodular infiltrates noted within the lung bases which are nonspecific but infectious etiology cannot be excluded.     LOCATION:  [place] [address]   Dictated by (CST): Quintin Rossi MD on 5/20/2024 at 1:39 PM     Finalized by (CST): Quintin Rossi MD on 5/20/2024 at 1:45 PM         Assessment:    Septic shock  Bacteremia  Bilateral hydronephrosis/hydroureter  ELENA     Tmax 101.8 on 5/14, Tmax 102  on 5/15, Tmax 103 on 5/16,   Tmax 101.7 on 5/17, Tmax 100.9 on 5/18, Tmax 99.9, most recent temp 99.3  WBC 23.3 > 19.8 > 10.3 > 6.4 > 7.1 > 8.3 > 7.5 > 8.3 > 10.3 > 9  Serum creat 1.84 > 1.43 > 1.45 > 1.26 > 1.34 > 1.23 > 0.94 > 1.2 > 1.64 > 1.43 > 1.23 > 1.18  UCx neg  2/2 Blood culture 5/14/24: Staph aureus MRSA  2/2 blood cultures 5/15/24: no growth after 5 days  2/2 blood cultures 5/16/24:prelim no growth after 4 days     History of BCG unresponsive T1/Tcis bladder cancer   Status post radical cystectomy orthotopic neobladder on 3/14/24.      Status-post I and D of SP carbuncle 5/16/24 with Dr. Cleve Skinner with 2+ Staph aureus MRSA     Right  perinephric hematoma  Afebrile, VSS  Hgb stable 8.3  CT urogram as above    Plan:    Check final cultures  Abx per ID  Follow labs,temp, UOP  CPM with cardona catheter  Dr. Poon reviewed CT urogram - stable hematoma.  Right hydronephrosis resolved.  Left hydronephrosis improved.  Contrast down both ureters. Neobladder well drained with cardona catheter.    Will need repeat imaging as outpatient  F/U with established urologist after discharge    Above discussed with patient, wife, nurse, Dr. Poon.    Mar Griffiths PA-C  [facility]  Department of Urology  5/21/2024  7:52 AM     No Repair - Repaired With Adjacent Surgical Defect Text (Leave Blank If You Do Not Want): After obtaining clear surgical margins the defect was repaired concurrently with another surgical defect which was in close approximation.

## 2024-05-21 NOTE — CM/SW NOTE
05/21/24 1600   CM/SW Referral Data   Referral Source Physician   Reason for Referral Discharge planning   Informant Patient   Patient Info   Patient's Current Mental Status at Time of Assessment Alert;Oriented   Patient's Home Environment House   Number of Levels in Home 2   Patient lives with Spouse/Significant other   Patient Status Prior to Admission   Independent with ADLs and Mobility Yes   Discharge Needs   Anticipated D/C needs Home health care;Infusion care   Choice of Post-Acute Provider   Informed patient of right to choose their preferred provider Yes     Order received for OP IV Abx    Pt is a 59 year old male admitted for generalized weakness, shortness of breath, cough.  Pt has hx of bladder cancer with neobladder creation. Pt with sepsis, positive blood cultures    PT worked with patient and Anticipated therapy need: Home with Home Healthcare      Met w/pt and his wife at the bedside to discuss discharge planning. He reports he is independent with ADLs/IADLs.  He has a hx with HH, but does not recall name of provider.    They are agreeable to plan for Home Health and IV Abx.   Referrals sent to HH providers and Infusion providers via [facility].     / to remain available for support and/or discharge planning.     Lynette CULPA MSN, RN CTL/  [id number]

## 2024-05-22 VITALS
TEMPERATURE: 98 F | RESPIRATION RATE: 18 BRPM | HEIGHT: 65 IN | OXYGEN SATURATION: 98 % | HEART RATE: 80 BPM | DIASTOLIC BLOOD PRESSURE: 61 MMHG | SYSTOLIC BLOOD PRESSURE: 117 MMHG | WEIGHT: 164 LBS | BODY MASS INDEX: 27.32 KG/M2

## 2024-05-22 LAB
ALBUMIN SERPL-MCNC: 1.8 G/DL (ref 3.4–5)
ALBUMIN/GLOB SERPL: 0.4 {RATIO} (ref 1–2)
ALP LIVER SERPL-CCNC: 105 U/L
ALT SERPL-CCNC: 35 U/L
ANION GAP SERPL CALC-SCNC: 4 MMOL/L (ref 0–18)
AST SERPL-CCNC: 27 U/L (ref 15–37)
BASOPHILS # BLD AUTO: 0.03 X10(3) UL (ref 0–0.2)
BASOPHILS NFR BLD AUTO: 0.3 %
BILIRUB SERPL-MCNC: 0.4 MG/DL (ref 0.1–2)
BUN BLD-MCNC: 11 MG/DL (ref 9–23)
CALCIUM BLD-MCNC: 8.3 MG/DL (ref 8.5–10.1)
CHLORIDE SERPL-SCNC: 101 MMOL/L (ref 98–112)
CO2 SERPL-SCNC: 30 MMOL/L (ref 21–32)
CREAT BLD-MCNC: 1.25 MG/DL
CREAT BLD-MCNC: 1.25 MG/DL
EGFRCR SERPLBLD CKD-EPI 2021: 66 ML/MIN/1.73M2 (ref 60–?)
EGFRCR SERPLBLD CKD-EPI 2021: 66 ML/MIN/1.73M2 (ref 60–?)
EOSINOPHIL # BLD AUTO: 0.26 X10(3) UL (ref 0–0.7)
EOSINOPHIL NFR BLD AUTO: 2.7 %
ERYTHROCYTE [DISTWIDTH] IN BLOOD BY AUTOMATED COUNT: 16.5 %
GLOBULIN PLAS-MCNC: 5 G/DL (ref 2.8–4.4)
GLUCOSE BLD-MCNC: 117 MG/DL (ref 70–99)
GLUCOSE BLD-MCNC: 123 MG/DL (ref 70–99)
GLUCOSE BLD-MCNC: 214 MG/DL (ref 70–99)
HCT VFR BLD AUTO: 27.2 %
HGB BLD-MCNC: 8.3 G/DL
IMM GRANULOCYTES # BLD AUTO: 0.28 X10(3) UL (ref 0–1)
IMM GRANULOCYTES NFR BLD: 2.9 %
LYMPHOCYTES # BLD AUTO: 0.93 X10(3) UL (ref 1–4)
LYMPHOCYTES NFR BLD AUTO: 9.5 %
MCH RBC QN AUTO: 26.3 PG (ref 26–34)
MCHC RBC AUTO-ENTMCNC: 30.5 G/DL (ref 31–37)
MCV RBC AUTO: 86.1 FL
MONOCYTES # BLD AUTO: 0.8 X10(3) UL (ref 0.1–1)
MONOCYTES NFR BLD AUTO: 8.2 %
NEUTROPHILS # BLD AUTO: 7.44 X10 (3) UL (ref 1.5–7.7)
NEUTROPHILS # BLD AUTO: 7.44 X10(3) UL (ref 1.5–7.7)
NEUTROPHILS NFR BLD AUTO: 76.4 %
OSMOLALITY SERPL CALC.SUM OF ELEC: 280 MOSM/KG (ref 275–295)
PLATELET # BLD AUTO: 259 10(3)UL (ref 150–450)
POTASSIUM SERPL-SCNC: 3.9 MMOL/L (ref 3.5–5.1)
PROT SERPL-MCNC: 6.8 G/DL (ref 6.4–8.2)
RBC # BLD AUTO: 3.16 X10(6)UL
SODIUM SERPL-SCNC: 135 MMOL/L (ref 136–145)
TROPONIN I SERPL HS-MCNC: 9 NG/L
TROPONIN I SERPL HS-MCNC: 9 NG/L
VANCOMYCIN TROUGH SERPL-MCNC: 12.4 UG/ML (ref 10–20)
WBC # BLD AUTO: 9.7 X10(3) UL (ref 4–11)

## 2024-05-22 RX ORDER — VANCOMYCIN/0.9 % SOD CHLORIDE 1 G/100 ML
1 PLASTIC BAG, INJECTION (ML) INTRAVENOUS EVERY 24 HOURS
Qty: 21 EACH | Refills: 0 | Status: SHIPPED | OUTPATIENT
Start: 2024-05-22 | End: 2024-06-12

## 2024-05-22 RX ORDER — HYDROCODONE BITARTRATE AND ACETAMINOPHEN 5; 325 MG/1; MG/1
1 TABLET ORAL EVERY 6 HOURS PRN
Qty: 12 TABLET | Refills: 0 | Status: SHIPPED | OUTPATIENT
Start: 2024-05-22

## 2024-05-22 NOTE — OCCUPATIONAL THERAPY NOTE
OCCUPATIONAL THERAPY TREATMENT NOTE - INPATIENT     Room Number: 378/378-A  Session: 1   Number of Visits to Meet Established Goals: 3    Presenting Problem: fatigue, weakness, fall, anemia, UTI, ELENA, septic shock, PNA  Prior Level of Function: Patient reports independent in all BADLs and functional mobility via tri-wheeled walker short distances, scooter for longer distances prior to admission. Patient always has supervision between wife and his sister, who lives nearby and comes to assist when wife is at work.    ASSESSMENT   Patient demonstrates good  progress this session, goals remain in progress.    Patient continues to function near baseline with toileting, lower body dressing, bed mobility, transfers, static standing balance, dynamic standing balance, and functional standing tolerance. Occupational Therapy will continue to follow patient for duration of hospitalization.    Patient continues to benefit from continued skilled OT services: For duration of hospitalization, however, given the patient is functioning near baseline level do not anticipate skilled therapy needs at discharge .          OT Device Recommendations: TBD    History: Patient is a 59 year old male admitted on 5/14/2024 with Presenting Problem: fatigue, weakness, fall, anemia, UTI, ELENA, septic shock, PNA. Patient in ICU 5/14-5/19/24. Co-Morbidities : bladder cancer status post neobladder creation in March, nonischemic cardiomyopathy chronic low back pain status post spinal stimulator, pulmonary sarcoidosis    WEIGHT BEARING RESTRICTION  Weight Bearing Restriction: None                Recommendations for nursing staff:   Transfers: 1 person  Toileting location: bedside commode due to dizziness    TREATMENT SESSION:  Patient Start of Session: seated in chair  FUNCTIONAL TRANSFER ASSESSMENT  Sit to Stand: Chair  Edge of Bed: Not Tested  Chair: Contact Guard Assist  Commode Transfer: Not Tested    BED MOBILITY  Supine to Sit : Not  tested  Scooting: NT    BALANCE ASSESSMENT     FUNCTIONAL ADL ASSESSMENT  LB Dressing Seated: Not Tested  LB Dressing Standing: Not Tested  Toileting Standing: Not Tested      ACTIVITY TOLERANCE: WFL                         O2 SATURATIONS       EDUCATION PROVIDED  Patient: Role of Occupational Therapy; Plan of Care; Discharge Recommendations; DME Recommendations; Functional Transfer Techniques; Fall Prevention; Posture/Positioning; Energy Conservation; Proper Body Mechanics  Patient's Response to Education: Verbalized Understanding; Returned Demonstration      Equipment used: RW  Demonstrates functional use, Would benefit from additional trial     Therapist comments: Pt received seated in chair, pleasant and cooperative for OT session. Pt agreeable to trial functional mobility in preparation for ambulatory toilet transfer and standing based ADLs/functional transfers. Pt is limited with long distance mobility due to lightheadedness/dizziness, however vitals WFL. Pt engages in short bouts of functional mobility requiring CGA with cues provided for fixed gaze stabilization to decrease lightheadedness/dizziness. Pt tolerated well and demos footwear management at supervision with figure 4 sitting. Pt and wife provided AE/DME handout for bedside commode to increase safety with toileting.   Patient End of Session: Up in chair;Needs met;Call light within reach;RN aware of session/findings;All patient questions and concerns addressed;Alarm set;Family present    SUBJECTIVE  \"My leg slipped from under me when I was trying to get out of bed.\"    PAIN ASSESSMENT  Ratin  Location: from the tip of my toes to the top of my head  Management Techniques: Repositioning;Relaxation     OBJECTIVE  Precautions: Bed/chair alarm    AM-PAC ‘6-Clicks’ Inpatient Daily Activity Short Form  -   Putting on and taking off regular lower body clothing?: A Little  -   Bathing (including washing, rinsing, drying)?: A Little  -   Toileting, which  includes using toilet, bedpan or urinal? : A Little  -   Putting on and taking off regular upper body clothing?: None  -   Taking care of personal grooming such as brushing teeth?: None  -   Eating meals?: None    AM-PAC Score:  Score: 21  Approx Degree of Impairment: 32.79%  Standardized Score (AM-PAC Scale): 44.27    PLAN  OT Treatment Plan: Balance activities;Energy conservation/work simplification techniques;ADL training;Functional transfer training;Endurance training;Patient/Family education;Patient/Family training;Compensatory technique education  Rehab Potential : Good  Frequency: 3x/week    OT Goals:     All goals ongoing 05/22    ADL GOALS   Patient will perform lower body dressing with supervision- goal met 05/22  Patient will perform toileting with supervision     FUNCTIONAL TRANSFER GOALS   Patient will perform sit to supine with supervision  Patient will transfer to toilet (or commode, pending dizziness) with supervision    OT Session Time: 25 minutes  Therapeutic Activity: 25 minutes

## 2024-05-22 NOTE — PLAN OF CARE
Patient A&Ox4, VSS on RA, , tele; NSR. Patient reports generalized pain; declined pain medications offered at this time. Incision to right suprapubic area intact. Cardona intact and draining yellow urine, small mucus noted in bag. Plan to DC with IV antibiotics when set up by SW. Urology to determine cardona management prior to DC. Plan of care reviewed with patient. Patient demonstrates understanding.

## 2024-05-22 NOTE — PLAN OF CARE
AxO4. VSS on RA - GRACIA w/ CPAP. On tele-NSR. R PICC line patent. See MAR for blood glucose control - on QID accuchecks. Rodriguez draining yellow urine - irrigate TID or PRN, aspirating yellow urine w/ mucous shreds. C/o abdominal pain R>L - PRN norco available. On IV vanco Q24. Up min assist. To dc home w/ HH, on IV abx for 4 weeks. Spouse at bedside. Updated on POC. Safety measures placed. Care ongoing.

## 2024-05-22 NOTE — PHYSICAL THERAPY NOTE
PHYSICAL THERAPY TREATMENT NOTE - INPATIENT    Room Number: 378/378-A     Session: 2    Number of Visits to Meet Established Goals: 5    Presenting Problem: Septic shock, secondary to UT, and possible multifocal pneumonia, UTI  Co-Morbidities : bladder cancer status post neobladder creation in March, nonischemic cardiomyopathy chronic low back pain status post spinal stimulator, pulmonary sarcoidosis    HOME SITUATION  Type of Home: House   Home Layout: Two level  Stairs to Enter : 2  Stairs to Bedroom: 14     Lives With: Spouse  Patient Owned Equipment:  (tri walker and scooter)     Prior Level of [place]: Prior to admission, patient's baseline is mod I in his mobilities and is amb with a tri-wheeled walker and uses a scooter in longer distances.     ASSESSMENT   Patient demonstrates fair progress this session, goals  remain in progress.    Patient continues to function below baseline with bed mobility, transfers, gait, and stair negotiation.  Contributing factors to remaining limitations include decreased functional strength, decreased endurance/aerobic capacity, pain, impaired static and dynamic standing balance, impaired coordination, impaired motor planning, decreased muscular endurance, and medical status.  Next session anticipate patient to progress transfers, gait, and stair negotiation.  Physical Therapy will continue to follow patient for duration of hospitalization.    Patient continues to benefit from continued skilled PT services: at discharge to promote functional independence and safety with additional support and return home with home health PT.    PLAN  PT Treatment Plan: Bed mobility;Body mechanics;Patient education;Gait training;Strengthening;Stair training;Transfer training;Balance training;Range of motion;Family education  Rehab Potential : Good  Frequency (Obs): 3-5x/week    CURRENT GOALS     Goal #1 Patient is able to demonstrate supine - sit EOB @ level: supervision      Goal #2  Patient is able to demonstrate transfers Sit to/from Stand at assistance level: supervision      Goal #3 Patient is able to ambulate 150 feet with assist device: walker - rolling at assistance level: supervision      Goal #4 Asc/desc 1 flight of stairs with 1 flight of stairs      Goal #5 Pt will be ind/mod I in HEP for B LE while seated/supine      Goal #6     Goal Comments: Goals established on 5/18/2024 5/22/2024 all goals ongoing     SUBJECTIVE  \"I'm dizzy, but I have been dealing with this since before I came here\"     OBJECTIVE  Precautions: Bed/chair alarm    WEIGHT BEARING RESTRICTION  Weight Bearing Restriction: None                PAIN ASSESSMENT   Rating: Unable to rate  Location: \"all over\"  Management Techniques: Activity promotion;Body mechanics;Breathing techniques;Relaxation;Repositioning    BALANCE                                                                                                                       Static Sitting: Good  Dynamic Sitting: Good           Static Standing: Fair -  Dynamic Standing: Fair -    ACTIVITY TOLERANCE                         O2 WALK         AM-PAC '6-Clicks' INPATIENT SHORT FORM - BASIC MOBILITY  How much difficulty does the patient currently have...  Patient Difficulty: Turning over in bed (including adjusting bedclothes, sheets and blankets)?: None   Patient Difficulty: Sitting down on and standing up from a chair with arms (e.g., wheelchair, bedside commode, etc.): None   Patient Difficulty: Moving from lying on back to sitting on the side of the bed?: A Little   How much help from another person does the patient currently need...   Help from Another: Moving to and from a bed to a chair (including a wheelchair)?: A Little   Help from Another: Need to walk in hospital room?: A Little   Help from Another: Climbing 3-5 steps with a railing?: A Little       AM-PAC Score:  Raw Score: 20   Approx Degree of Impairment: 35.83%   Standardized Score (AM-PAC Scale):  47.67   CMS Modifier (G-Code): CJ    FUNCTIONAL ABILITY STATUS  Gait Assessment   Functional Mobility/Gait Assessment  Gait Assistance: Contact guard assist  Distance (ft): 20,20  Assistive Device: Rolling walker  Pattern: Shuffle  Stairs: Stairs  How Many Stairs: 1  Device: 2 Rails  Assist: Contact guard assist  Pattern: Ascend and Descend  Ascend and Descend : Step to    Skilled Therapy Provided  Pt received seated in BS chair, spouse present  BP monitored, d/t pt c/o dizziness and WNL  Pt gait trained c seated rest breaks 2/2 reports of feeling dizzy- pt states this is present PTA  Pt wheeled to gym, therapist bakari stair negotiation, pt performed 1 step c CGA and cues for sequencing  Pt states he will sleep on main level of home   Pt's spouse present and all questions and concerns addressed. Lending closet hand out provided to spouse   Bed Mobility:  Rolling: nt  Supine<>Sit: nt  Sit<>Supine: NT     Transfer Mobility:  Sit<>Stand: CGA to RW- v/c for hand placement   Stand<>Sit: CGA   Gait: NT    Therapist's Comments:   /65 standing     Patient End of Session: Up in chair;Needs met;Call light within reach;RN aware of session/findings;All patient questions and concerns addressed;Alarm set;Family present    PT Session Time: 30 minutes  Gait training 15  Therapeutic Activity: 15 minutes

## 2024-05-22 NOTE — DISCHARGE SUMMARY
[facility] Hospitalist Discharge Summary     Patient ID:  Yunior Garrett  59 year old  4/12/1965    Admit date: 5/14/2024    Discharge date and time: 05/22/24     Attending Physician: Fahad Nur*     Primary Care Physician: Antione Valadez DO     Discharge Diagnoses: Metabolic acidosis [E87.20]  Sepsis, due to unspecified organism, unspecified whether acute organ dysfunction present (HCC) [A41.9]    Please note that only IHP DMG and EMG patients enrolled in the Medicare ACO, BCBS ACO and [facility] HMOs will be handled by the [facility] Care Management team.  For all other patients, please follow usual protocol for discharge care transition.    Discharge Condition: stable    Disposition:  home    Important Follow up:  - PCP within 2 weeks       Follow-up Information       Ramiro Yost MD Follow up.    Specialty: UROLOGY  Why: Will need repeat imaging as outpatient  Contact information:  [address] [place] [address]  [address] [facility] [address] [phone]                                 Hospital Course:      59 year old male with PMH sig for bladder cancer status post neobladder creation in March, nonischemic cardiomyopathy chronic low back pain status post spinal stimulator, pulmonary sarcoidosis who presents for evaluation of 1 week of generalized weakness, some shortness of breath, cough.  Patient was in usual state of health after his surgery until about a week ago when he started feeling tired, he had a fall earlier in the day.  Per wife, no fevers noted at home until the ambulance arrived.  No vomiting or diarrhea.  He has been urinating well since the creation of his neobladder.     In the ER, patient's vitals were significant for blood pressure 84/50, fever 101.7, heart rate 115 and respirations 34.  Patient had a CT chest abdomen pelvis which was abnormal.  He was admitted for further workup and management.    Septic shock-  resolved  MRSA bacteremia - ?etiology, carbuncle or given recent surgery for neobladder at [facility]  - IV pressors, wean as tolerated  - can dc IVF  - Lactate within normal range  - 2D echo-no evidence of endocarditis  - Empiric cefepime and azithromycin, added vancomycin - per ID   - Blood cultures 2 out of 2 positive for MRSA, urine cultures neg, repeat blood cultures- NGTD  - ID on consult, recs reviewed - s/p PICC placement with 4w course iv vancomycin through 6/11   - ICU management - stable for transfer  - abdominal pain dizziness, abdominal US pending r/o GB etiology - no biliary source  - repeat CT with no active extravasation, monitor clinically      R perinephric/Retroperitoneal hematoma, 9.9cm   Acute on chronic anemia  - H/H q8h - check again this PM then regular monitoring   - monitor for active bleeding  - goal Hgb>7, transfuse PRN (s/p 4u PRBCs)   - hold all AC/antiplatelets - resume asa Friday   - no signs of active bleeding on CT   - Hgb trending up, suspect the bleeding has stopped      Carbuncle, R suprapubic region  - s/p bedside I&D by urology  - cx with MRSA     ELENA- resolved  - In the setting of UTI and sepsis  - Follow BMP  - will resume LR in setting of anemia/hypovolemia and mild Cr elevation  - stable      History of bladder cancer  - Status post recent neobladder creation at [facility] in March 2024  - Follows with [facility] urologist  - will f/u next week      History of pulmonary sarcoidosis  - Multiple nodules on CT chest, unclear if infection versus sarcoid related  - Plan to treat with IV antibiotics for now, may need outpatient monitoring with imaging  - Has chronic shortness of breath, currently bacteremic no indication for steroids at this time     Nonischemic cardiomyopathy  - Echo without any vegetation, normal EF     Hypertension  - Hold antihypertensives until BP improves  - holding amlodipine on dc   - resumed ARB     Hyperlipidemia  - resume crestor          Type 2 diabetes with  hyperglycemia  - Hold home insulin  - Accu-Cheks, increased to medium dose ISS, may need long-acting coverage when appetite improves             Consults: IP CONSULT TO UROLOGY  IP CONSULT TO UROLOGY  IP CONSULT TO HOSPITALIST  IP CONSULT TO CRITICAL CARE INTENSIVIST  IP CONSULT TO INFECTIOUS DISEASE  IP CONSULT TO PHARMACY  IP CONSULT TO VASCULAR ACCESS TEAM  IP CONSULT TO SOCIAL WORK    Operative Procedures:        Patient instructions:      I as the attending physician reconciled the current and discharge medications on day of discharge.     Current Discharge Medication List        START taking these medications    Details   HYDROcodone-acetaminophen 5-325 MG Oral Tab Take 1 tablet by mouth every 6 (six) hours as needed.      Vancomycin HCl in NaCl 1-0.9 GM/250ML-% Intravenous Solution Inject 250 mL (1 g total) into the vein daily for 21 days. Weekly CBC and CMP.  Weekly PICC care.  NS flushes at needed.  Pharmacy to dose vancomycin.           CONTINUE these medications which have CHANGED    Details   aspirin 81 MG Oral Tab Take 1 tablet (81 mg total) by mouth daily.           CONTINUE these medications which have NOT CHANGED    Details   SEMGLEE, YFGN, 100 UNIT/ML Subcutaneous Solution Pen-injector Inject 20 Units into the skin nightly.      amLODIPine 10 MG Oral Tab Take 1 tablet (10 mg total) by mouth every morning.      Empagliflozin (JARDIANCE) 25 MG Oral Tab Take 1 tablet by mouth every morning.      valsartan 160 MG Oral Tab Take 1 tablet (160 mg total) by mouth daily.      Vitamin D, Cholecalciferol, 25 MCG (1000 UT) Oral Cap Take 1 capsule by mouth every 30 (thirty) days.      rosuvastatin 20 MG Oral Tab Take 1 tablet (20 mg total) by mouth daily.      gabapentin 300 MG Oral Cap Take 1 capsule (300 mg total) by mouth 3 (three) times daily.      Insulin Pen Needle 32G X 4 MM Does not apply Misc Inject 1 Units into the skin daily.             Activity: activity as tolerated  Diet: regular diet  Wound Care:  as directed  Code Status: Full Code      Discharge Exam:     General: no acute distress, alert and oriented x 3  Heart: RRR  Lungs: clear bilaterally, no active wheezing  Abdomen: nontender, nondistended, intact BS  Extremities: no pedal edema   Neuro: CN inact, no focal deficits      Total time coordinating care for discharge: Greater than 30 minutes    Edmundo Nur MD  [facility] Hospitalist

## 2024-05-22 NOTE — PROGRESS NOTES
[facility]  Urology Progress Note    Yunior Garrett Patient Status:  Inpatient    1965 MRN [id number]   Location [facility] 3SW-A Attending Fahad Nur*   Hosp Day # 8 PCP Antione Valadez,      Subjective:  Yunior Garrett is a(n) 59 year old male.    Current complaints: Patient resting comfortably.  Wife at bedside. +abdominal pain.      Objective:  General appearance: no distress.    Blood pressure 129/58, pulse 69, temperature 97.7 °F (36.5 °C), temperature source Oral, resp. rate 18, height 5' 5\" (1.651 m), weight 164 lb (74.4 kg), SpO2 96%.  Lungs: non-labored respirations  Abdomen: soft  : cardona catheter in place draining yellow urine (UOP - 3600 mL/24 hours)  Lab Results   Component Value Date    WBC 9.7 2024    HGB 8.3 2024    HCT 27.2 2024    .0 2024    CREATSERUM 1.25 2024    CREATSERUM 1.25 2024    BUN 11 2024     2024    K 3.9 2024     2024    CO2 30.0 2024     2024    CA 8.3 2024    ALB 1.8 2024    ALKPHO 105 2024    BILT 0.4 2024    TP 6.8 2024    AST 27 2024    ALT 35 2024    PGLU 123 2024       Hospital Encounter on 24   1. Blood Culture     Status: None    Collection Time: 24  9:18 PM    Specimen: Blood,peripheral   Result Value Ref Range    Blood Culture Result No Growth 5 Days N/A   2. Aerobic Bacterial Culture     Status: Abnormal    Collection Time: 24  5:29 PM    Specimen: perineal; Other   Result Value Ref Range    Aerobic Culture Result 2+ growth Staphylococcus aureus, MRSA (A) N/A    Aerobic Smear 3+ WBCs seen N/A    Aerobic Smear 2+ Gram Positive Cocci N/A       Susceptibility    Staphylococcus aureus, MRSA -  (no method available)     Cefazolin  Resistant      Clindamycin <=0.25 Sensitive      Erythromycin >=8 Resistant      Gentamicin <=0.5 Sensitive      Levofloxacin 4 Resistant       Oxacillin >=4 Resistant      Tetracycline <=1 Sensitive      Trimethoprim/Sulfa <=10 Sensitive      Vancomycin 1 Sensitive    3. Urine Culture, Routine     Status: None    Collection Time: 05/16/24  1:25 PM    Specimen: Urine, cardona catheter   Result Value Ref Range    Urine Culture No Growth at 18-24 hrs. N/A        CT UROGRAM(W+WO) W/3D(CPT=74178/78598)    Result Date: 5/20/2024  CONCLUSION:  1. Stable right perinephric hematoma measuring approximately 9.1 x 3.5 cm.  No evidence for active extravasation is noted. 2. Moderate left-sided hydronephrosis without obstructing urolithiasis.  There is air within the right renal collecting system and ureter which may be secondary to postoperative changes. 3. Postoperative changes from cystectomy and neobladder creation. 4. Small bilateral pleural effusions with associated areas of consolidation.  There also nodular infiltrates noted within the lung bases which are nonspecific but infectious etiology cannot be excluded.     LOCATION:  [place] [address]   Dictated by (CST): Quintin Rossi MD on 5/20/2024 at 1:39 PM     Finalized by (CST): Quintin Rossi MD on 5/20/2024 at 1:45 PM         Assessment:    Septic shock - resolved  Bacteremia  Bilateral hydronephrosis/hydroureter  ELENA     Afebrile   WBC 23.3 > 19.8 > 10.3 > 6.4 > 7.1 > 8.3 > 7.5 > 8.3 > 10.3 > 9 > 9.7  Serum creat 1.84 > 1.43 > 1.45 > 1.26 > 1.34 > 1.23 > 0.94 > 1.2 > 1.64 > 1.43 > 1.23 > 1.18 > 1.25  UCx neg  2/2 Blood culture 5/14/24: Staph aureus MRSA  2/2 blood cultures 5/15/24: no growth after 5 days  2/2 blood cultures 5/16/24: no growth after 5 days     History of BCG unresponsive T1/Tcis bladder cancer   Status post radical cystectomy orthotopic neobladder on 3/14/24.      Status-post I and D of SP carbuncle 5/16/24 with Dr. Poon  Cx with 2+ Staph aureus MRSA     Right perinephric hematoma  Afebrile, VSS  Hgb stable 8.3  CT urogram as above    Plan:    Abx per ID  Follow labs,temp, UOP  CPM with cardona  catheter  Dr. Poon reviewed CT urogram - stable hematoma.  Right hydronephrosis resolved.  Left hydronephrosis improved.  Contrast down both ureters. Neobladder well drained with cardona catheter.    Will need repeat imaging as outpatient  F/U with established urologist after discharge    Above discussed with wife.    Mar Griffiths PA-C  [facility]  Department of Urology  5/22/2024  6:42 AM

## 2024-05-22 NOTE — PROGRESS NOTES
Infectious Disease Progress Note      Date of admission: 5/14/2024  4:43 AM     Reason for consult: MRSA bacteremia    Subjective: Feels better compared to when he first came in.  No abdominal pain.  No nausea or vomiting.  No diarrhea.  No shortness of breath.  No cough or sputum production.    The rest of the systems were reviewed and found to be negative except was mentioned above    Interval events: This is a 59-year-old male patient with history of bladder cancer, recently underwent a cystectomy with neobladder formation on 3/14/2024.  He also has a history of sarcoidosis for which she sees [facility].  He presents here with septic shock in the setting of MRSA bacteremia.  Course was complicated by right perinephric hematoma.  Evaluated by urology, Rodriguez in place.  No surgical intervention planned at this point    Medications:    losartan    rosuvastatin    vancomycin    diphenhydrAMINE    LORazepam    insulin aspart    HYDROcodone-acetaminophen    acetaminophen    glucose **OR** glucose **OR** glucose-vitamin C **OR** dextrose **OR** glucose **OR** glucose **OR** glucose-vitamin C    ondansetron    prochlorperazine    polyethylene glycol (PEG 3350)    sennosides    bisacodyl    fleet enema    melatonin     Allergies:  No Known Allergies    Physical Exam:  Vitals:    05/22/24 0800   BP: 122/66   Pulse: 71   Resp: 17   Temp: 98.6 °F (37 °C)     Vitals signs and nursing note reviewed.   Constitutional:       Appearance: Normal appearance.   HENT:      Head: Normocephalic and atraumatic.      Mouth: Mucous membranes are moist.   Neck:      Musculoskeletal: Neck supple.   Cardiovascular:      Rate and Rhythm: Normal rate.   Pulmonary:      Effort: Pulmonary effort is normal. No respiratory distress.   Abdominal:      General: Abdomen is flat. There is no distension.      Palpations: Abdomen is soft. There is no mass.      Tenderness: There is mild lower abdominal tenderness. There is no guarding or rebound.       Hernia: No hernia is present.   Musculoskeletal:      Right lower leg: No edema.      Left lower leg: No edema.   Skin:     General: Skin is warm and dry.   Neurological:      General: No focal deficit present.      Mental Status: Alert and oriented to person, place, and time.       Laboratory data:  I have reviewed all the lab results independently.  Lab Results   Component Value Date    WBC 9.7 05/22/2024    HGB 8.3 05/22/2024    HCT 27.2 05/22/2024    .0 05/22/2024    CREATSERUM 1.25 05/22/2024    CREATSERUM 1.25 05/22/2024    BUN 11 05/22/2024     05/22/2024    K 3.9 05/22/2024     05/22/2024    CO2 30.0 05/22/2024     05/22/2024    CA 8.3 05/22/2024    ALB 1.8 05/22/2024    ALKPHO 105 05/22/2024    BILT 0.4 05/22/2024    TP 6.8 05/22/2024    AST 27 05/22/2024    ALT 35 05/22/2024      Recent Labs   Lab 05/22/24  0437   RBC 3.16*   HGB 8.3*   HCT 27.2*   MCV 86.1   MCH 26.3   MCHC 30.5*   RDW 16.5   NEPRELIM 7.44   WBC 9.7   .0      Microbiology data:  Hospital Encounter on 05/14/24   1. Blood Culture     Status: None    Collection Time: 05/16/24  9:18 PM    Specimen: Blood,peripheral   Result Value Ref Range    Blood Culture Result No Growth 5 Days N/A   2. Aerobic Bacterial Culture     Status: Abnormal    Collection Time: 05/16/24  5:29 PM    Specimen: perineal; Other   Result Value Ref Range    Aerobic Culture Result 2+ growth Staphylococcus aureus, MRSA (A) N/A    Aerobic Smear 3+ WBCs seen N/A    Aerobic Smear 2+ Gram Positive Cocci N/A       Susceptibility    Staphylococcus aureus, MRSA -  (no method available)     Cefazolin  Resistant      Clindamycin <=0.25 Sensitive      Erythromycin >=8 Resistant      Gentamicin <=0.5 Sensitive      Levofloxacin 4 Resistant      Oxacillin >=4 Resistant      Tetracycline <=1 Sensitive      Trimethoprim/Sulfa <=10 Sensitive      Vancomycin 1 Sensitive    3. Urine Culture, Routine     Status: None    Collection Time: 05/16/24  1:25 PM     Specimen: Urine, cardona catheter   Result Value Ref Range    Urine Culture No Growth at 18-24 hrs. N/A     Impression:  Yunior Garrett is a 59 year old male with    MRSA sepsis/bacteremia with risk for life  Most likely source is his neobladder in the setting of hydronephrosis  Now Cardona catheter in place  Cleared his bacteremia on 5/15  TTE on 5/15 without endocarditis  Currently on IV vancomycin  Right perinephric hematoma noted on CT on 5/18  Urology following  Doubt this infected at this point; however, it could get secondarily infected in the future given his bacteremia  Plan to transfer to [facility] for higher level of care  Suprapubic carbuncle  I&D by urology on 5/16  Cultures with MRSA      Recommendations:    Continue IV vancomycin for now  Will plan on 4 weeks of IV antibiotics through 6/11  Weekly CBC with differential and CMP and vancomycin trough.  Fax results to [facility]. Fax: [phone]. Tel: [phone].  PICC line care as per protocol.  Follow-up with infectious disease in 2 weeks after discharge  Okay to discharge from ID perspective once okay with the other services  Continue to monitor daily labs for antibiotic toxicities  Further recommendations will depend on the above work-up and clinical progress     The plan of care was discussed with the primary hospital team, Fahad Nur*     Recommendations were also discussed with the patient; all questions were answered.     Thank you for this consultation. Please don't hesitate to call the ID team for questions or any acute changes in patient's clinical condition.    Please note that this report has been produced using speech recognition software and may contain errors related to that system including, but not limited to, errors in grammar, punctuation, and spelling, as well as words and phrases that possibly may have been recognized inappropriately.  If there are any questions or concerns, contact the dictating provider  for clarification.    The  [employer] Cures Act makes medical notes like these available to patients in the interest of transparency. Please be advised this is a medical document. Medical documents are intended to carry relevant information, facts as evident, and the clinical opinion of the practitioner. The medical note is intended as peer to peer communication and may appear blunt or direct. It is written in medical language and may contain abbreviations or verbiage that are unfamiliar.     Lana Sellers MD  [facility]. Tel: [phone]. Fax: [phone].     Yunior Garrett : 1965 MRN: [id number] [facility]: [id number]

## 2024-05-22 NOTE — CM/SW NOTE
05/22/24 1500   Discharge disposition   Expected discharge disposition Home-Health   Post Acute Care Provider [facility]   DME/Infusion Providers [facility]   Discharge transportation Private car     Plan is for discharge today with  and IV Vancomycin.    Referrals sent in [place] and choice is [facility] and [facility].    Confirmed w/Leanna from [facility] that RN can see tomorrow.  Confirmed with Lyndsey from [facility] that delivery will be scheduled tonight or tomorrow pending results of Vanco trough.  Notified RN and she will get lab done.    Addendum: Vanco trough done and uploaded.  Notified Lyndsey from [facility] and she will contact pt to set up delivery.    Met with patient and wife to explain plan and process and they are agreeable and verbalize understanding.     / to remain available for support and/or discharge planning.     Lynette CULPA MSN, RN CTL/  [id number]

## 2024-05-23 LAB
ATRIAL RATE: 76 BPM
P AXIS: 24 DEGREES
P-R INTERVAL: 182 MS
Q-T INTERVAL: 432 MS
QRS DURATION: 120 MS
QTC CALCULATION (BEZET): 486 MS
R AXIS: -47 DEGREES
T AXIS: -18 DEGREES
VENTRICULAR RATE: 76 BPM

## 2024-05-23 NOTE — PROGRESS NOTES
NURSING DISCHARGE NOTE    Discharged Home via Wheelchair.  Accompanied by Support staff  Belongings Taken by patient/family.    Discharge instructions including medications, follow-up appointments, and self care expectations reviewed with patient. PIV removed. Verified with urology PA that patient to DC with cardona and follow up with patient urologist outpatient. All questions answered at this time. Patient demonstrates understanding.

## 2024-11-05 ENCOUNTER — HOSPITAL ENCOUNTER (INPATIENT)
Facility: HOSPITAL | Age: 59
LOS: 2 days | Discharge: HOME | End: 2024-11-07
Attending: EMERGENCY MEDICINE | Admitting: HOSPITALIST
Payer: COMMERCIAL

## 2024-11-05 ENCOUNTER — APPOINTMENT (OUTPATIENT)
Dept: GENERAL RADIOLOGY | Facility: HOSPITAL | Age: 59
End: 2024-11-05
Attending: EMERGENCY MEDICINE
Payer: COMMERCIAL

## 2024-11-05 DIAGNOSIS — I44.2 COMPLETE HEART BLOCK (HCC): Primary | ICD-10-CM

## 2024-11-05 LAB
ALBUMIN SERPL-MCNC: 4.8 G/DL (ref 3.2–4.8)
ALBUMIN/GLOB SERPL: 1.4 {RATIO} (ref 1–2)
ALP LIVER SERPL-CCNC: 110 U/L
ALT SERPL-CCNC: 14 U/L
ANION GAP SERPL CALC-SCNC: 8 MMOL/L (ref 0–18)
APTT PPP: 30 SECONDS (ref 23–36)
AST SERPL-CCNC: 15 U/L (ref ?–34)
BASOPHILS # BLD AUTO: 0.04 X10(3) UL (ref 0–0.2)
BASOPHILS NFR BLD AUTO: 0.4 %
BILIRUB SERPL-MCNC: 0.4 MG/DL (ref 0.2–1.1)
BUN BLD-MCNC: 31 MG/DL (ref 9–23)
CALCIUM BLD-MCNC: 9.1 MG/DL (ref 8.7–10.4)
CHLORIDE SERPL-SCNC: 110 MMOL/L (ref 98–112)
CO2 SERPL-SCNC: 19 MMOL/L (ref 21–32)
CREAT BLD-MCNC: 1.5 MG/DL
EGFRCR SERPLBLD CKD-EPI 2021: 53 ML/MIN/1.73M2 (ref 60–?)
EOSINOPHIL # BLD AUTO: 0.12 X10(3) UL (ref 0–0.7)
EOSINOPHIL NFR BLD AUTO: 1.2 %
ERYTHROCYTE [DISTWIDTH] IN BLOOD BY AUTOMATED COUNT: 13.5 %
GLOBULIN PLAS-MCNC: 3.4 G/DL (ref 2–3.5)
GLUCOSE BLD-MCNC: 294 MG/DL (ref 70–99)
HCT VFR BLD AUTO: 36.5 %
HGB BLD-MCNC: 12.6 G/DL
IMM GRANULOCYTES # BLD AUTO: 0.04 X10(3) UL (ref 0–1)
IMM GRANULOCYTES NFR BLD: 0.4 %
INR BLD: 1 (ref 0.8–1.2)
LYMPHOCYTES # BLD AUTO: 1.24 X10(3) UL (ref 1–4)
LYMPHOCYTES NFR BLD AUTO: 11.9 %
MAGNESIUM SERPL-MCNC: 1.9 MG/DL (ref 1.6–2.6)
MCH RBC QN AUTO: 29.5 PG (ref 26–34)
MCHC RBC AUTO-ENTMCNC: 34.5 G/DL (ref 31–37)
MCV RBC AUTO: 85.5 FL
MONOCYTES # BLD AUTO: 0.87 X10(3) UL (ref 0.1–1)
MONOCYTES NFR BLD AUTO: 8.3 %
NEUTROPHILS # BLD AUTO: 8.11 X10 (3) UL (ref 1.5–7.7)
NEUTROPHILS # BLD AUTO: 8.11 X10(3) UL (ref 1.5–7.7)
NEUTROPHILS NFR BLD AUTO: 77.8 %
NT-PROBNP SERPL-MCNC: 149 PG/ML (ref ?–125)
OSMOLALITY SERPL CALC.SUM OF ELEC: 301 MOSM/KG (ref 275–295)
PLATELET # BLD AUTO: 234 10(3)UL (ref 150–450)
POTASSIUM SERPL-SCNC: 3.8 MMOL/L (ref 3.5–5.1)
PROT SERPL-MCNC: 8.2 G/DL (ref 5.7–8.2)
PROTHROMBIN TIME: 13.3 SECONDS (ref 11.6–14.8)
RBC # BLD AUTO: 4.27 X10(6)UL
SARS-COV-2 RNA RESP QL NAA+PROBE: NOT DETECTED
SODIUM SERPL-SCNC: 137 MMOL/L (ref 136–145)
TROPONIN I SERPL HS-MCNC: 12 NG/L
WBC # BLD AUTO: 10.4 X10(3) UL (ref 4–11)

## 2024-11-05 PROCEDURE — 83735 ASSAY OF MAGNESIUM: CPT | Performed by: EMERGENCY MEDICINE

## 2024-11-05 PROCEDURE — 93010 ELECTROCARDIOGRAM REPORT: CPT

## 2024-11-05 PROCEDURE — 99285 EMERGENCY DEPT VISIT HI MDM: CPT

## 2024-11-05 PROCEDURE — 83880 ASSAY OF NATRIURETIC PEPTIDE: CPT | Performed by: EMERGENCY MEDICINE

## 2024-11-05 PROCEDURE — 71045 X-RAY EXAM CHEST 1 VIEW: CPT | Performed by: EMERGENCY MEDICINE

## 2024-11-05 PROCEDURE — 96374 THER/PROPH/DIAG INJ IV PUSH: CPT

## 2024-11-05 PROCEDURE — 85025 COMPLETE CBC W/AUTO DIFF WBC: CPT | Performed by: EMERGENCY MEDICINE

## 2024-11-05 PROCEDURE — 84484 ASSAY OF TROPONIN QUANT: CPT | Performed by: EMERGENCY MEDICINE

## 2024-11-05 PROCEDURE — 85610 PROTHROMBIN TIME: CPT | Performed by: EMERGENCY MEDICINE

## 2024-11-05 PROCEDURE — 93005 ELECTROCARDIOGRAM TRACING: CPT

## 2024-11-05 PROCEDURE — 85730 THROMBOPLASTIN TIME PARTIAL: CPT | Performed by: EMERGENCY MEDICINE

## 2024-11-05 PROCEDURE — 80053 COMPREHEN METABOLIC PANEL: CPT | Performed by: EMERGENCY MEDICINE

## 2024-11-05 RX ORDER — ONDANSETRON 2 MG/ML
4 INJECTION INTRAMUSCULAR; INTRAVENOUS EVERY 4 HOURS PRN
Status: ACTIVE | OUTPATIENT
Start: 2024-11-05 | End: 2024-11-05

## 2024-11-05 RX ORDER — DOPAMINE HYDROCHLORIDE 320 MG/100ML
INJECTION, SOLUTION INTRAVENOUS CONTINUOUS
Status: DISCONTINUED | OUTPATIENT
Start: 2024-11-05 | End: 2024-11-07

## 2024-11-06 ENCOUNTER — APPOINTMENT (OUTPATIENT)
Dept: CV DIAGNOSTICS | Facility: HOSPITAL | Age: 59
End: 2024-11-06
Attending: INTERNAL MEDICINE
Payer: COMMERCIAL

## 2024-11-06 ENCOUNTER — APPOINTMENT (OUTPATIENT)
Dept: INTERVENTIONAL RADIOLOGY/VASCULAR | Facility: HOSPITAL | Age: 59
End: 2024-11-06
Payer: COMMERCIAL

## 2024-11-06 LAB
ATRIAL RATE: 82 BPM
ATRIAL RATE: 97 BPM
GLUCOSE BLD-MCNC: 161 MG/DL (ref 70–99)
GLUCOSE BLD-MCNC: 186 MG/DL (ref 70–99)
GLUCOSE BLD-MCNC: 285 MG/DL (ref 70–99)
P AXIS: 14 DEGREES
P AXIS: 27 DEGREES
P-R INTERVAL: 218 MS
Q-T INTERVAL: 418 MS
Q-T INTERVAL: 612 MS
QRS DURATION: 128 MS
QRS DURATION: 130 MS
QTC CALCULATION (BEZET): 460 MS
QTC CALCULATION (BEZET): 488 MS
R AXIS: -55 DEGREES
R AXIS: -63 DEGREES
T AXIS: -42 DEGREES
T AXIS: -5 DEGREES
VENTRICULAR RATE: 34 BPM
VENTRICULAR RATE: 82 BPM

## 2024-11-06 PROCEDURE — 94799 UNLISTED PULMONARY SVC/PX: CPT

## 2024-11-06 PROCEDURE — 0JH608Z INSERTION OF DEFIBRILLATOR GENERATOR INTO CHEST SUBCUTANEOUS TISSUE AND FASCIA, OPEN APPROACH: ICD-10-PCS | Performed by: INTERNAL MEDICINE

## 2024-11-06 PROCEDURE — 99153 MOD SED SAME PHYS/QHP EA: CPT | Performed by: INTERNAL MEDICINE

## 2024-11-06 PROCEDURE — 5A09357 ASSISTANCE WITH RESPIRATORY VENTILATION, LESS THAN 24 CONSECUTIVE HOURS, CONTINUOUS POSITIVE AIRWAY PRESSURE: ICD-10-PCS | Performed by: HOSPITALIST

## 2024-11-06 PROCEDURE — 93306 TTE W/DOPPLER COMPLETE: CPT | Performed by: INTERNAL MEDICINE

## 2024-11-06 PROCEDURE — 02H63KZ INSERTION OF DEFIBRILLATOR LEAD INTO RIGHT ATRIUM, PERCUTANEOUS APPROACH: ICD-10-PCS | Performed by: INTERNAL MEDICINE

## 2024-11-06 PROCEDURE — 99152 MOD SED SAME PHYS/QHP 5/>YRS: CPT | Performed by: INTERNAL MEDICINE

## 2024-11-06 PROCEDURE — 82962 GLUCOSE BLOOD TEST: CPT

## 2024-11-06 PROCEDURE — B51NYZZ FLUOROSCOPY OF LEFT UPPER EXTREMITY VEINS USING OTHER CONTRAST: ICD-10-PCS | Performed by: INTERNAL MEDICINE

## 2024-11-06 PROCEDURE — 02HK3KZ INSERTION OF DEFIBRILLATOR LEAD INTO RIGHT VENTRICLE, PERCUTANEOUS APPROACH: ICD-10-PCS | Performed by: INTERNAL MEDICINE

## 2024-11-06 PROCEDURE — 33249 INSJ/RPLCMT DEFIB W/LEAD(S): CPT | Performed by: INTERNAL MEDICINE

## 2024-11-06 RX ORDER — NICOTINE POLACRILEX 4 MG
30 LOZENGE BUCCAL
Status: DISCONTINUED | OUTPATIENT
Start: 2024-11-06 | End: 2024-11-07

## 2024-11-06 RX ORDER — BISACODYL 10 MG
10 SUPPOSITORY, RECTAL RECTAL
Status: DISCONTINUED | OUTPATIENT
Start: 2024-11-06 | End: 2024-11-07

## 2024-11-06 RX ORDER — SODIUM CHLORIDE 9 MG/ML
INJECTION, SOLUTION INTRAVENOUS
Status: DISCONTINUED | OUTPATIENT
Start: 2024-11-07 | End: 2024-11-06 | Stop reason: HOSPADM

## 2024-11-06 RX ORDER — ACETAMINOPHEN 500 MG
500 TABLET ORAL EVERY 4 HOURS PRN
Status: DISCONTINUED | OUTPATIENT
Start: 2024-11-06 | End: 2024-11-07

## 2024-11-06 RX ORDER — PROCHLORPERAZINE EDISYLATE 5 MG/ML
5 INJECTION INTRAMUSCULAR; INTRAVENOUS EVERY 8 HOURS PRN
Status: DISCONTINUED | OUTPATIENT
Start: 2024-11-06 | End: 2024-11-07

## 2024-11-06 RX ORDER — ONDANSETRON 2 MG/ML
4 INJECTION INTRAMUSCULAR; INTRAVENOUS EVERY 6 HOURS PRN
Status: DISCONTINUED | OUTPATIENT
Start: 2024-11-06 | End: 2024-11-07

## 2024-11-06 RX ORDER — DEXTROSE MONOHYDRATE 25 G/50ML
50 INJECTION, SOLUTION INTRAVENOUS
Status: DISCONTINUED | OUTPATIENT
Start: 2024-11-06 | End: 2024-11-07

## 2024-11-06 RX ORDER — CHLORHEXIDINE GLUCONATE 40 MG/ML
SOLUTION TOPICAL
Status: DISCONTINUED | OUTPATIENT
Start: 2024-11-07 | End: 2024-11-06 | Stop reason: HOSPADM

## 2024-11-06 RX ORDER — SODIUM PHOSPHATE, DIBASIC AND SODIUM PHOSPHATE, MONOBASIC 7; 19 G/230ML; G/230ML
1 ENEMA RECTAL ONCE AS NEEDED
Status: DISCONTINUED | OUTPATIENT
Start: 2024-11-06 | End: 2024-11-07

## 2024-11-06 RX ORDER — NICOTINE POLACRILEX 4 MG
15 LOZENGE BUCCAL
Status: DISCONTINUED | OUTPATIENT
Start: 2024-11-06 | End: 2024-11-07

## 2024-11-06 RX ORDER — MIDAZOLAM HYDROCHLORIDE 1 MG/ML
INJECTION INTRAMUSCULAR; INTRAVENOUS
Status: COMPLETED
Start: 2024-11-06 | End: 2024-11-06

## 2024-11-06 RX ORDER — SENNOSIDES 8.6 MG
17.2 TABLET ORAL NIGHTLY PRN
Status: DISCONTINUED | OUTPATIENT
Start: 2024-11-06 | End: 2024-11-07

## 2024-11-06 RX ORDER — POLYETHYLENE GLYCOL 3350 17 G/17G
17 POWDER, FOR SOLUTION ORAL DAILY PRN
Status: DISCONTINUED | OUTPATIENT
Start: 2024-11-06 | End: 2024-11-07

## 2024-11-06 RX ORDER — VANCOMYCIN HYDROCHLORIDE 1 G/20ML
INJECTION, POWDER, LYOPHILIZED, FOR SOLUTION INTRAVENOUS
Status: COMPLETED
Start: 2024-11-06 | End: 2024-11-06

## 2024-11-06 RX ORDER — LIDOCAINE HYDROCHLORIDE 10 MG/ML
INJECTION, SOLUTION EPIDURAL; INFILTRATION; INTRACAUDAL; PERINEURAL
Status: COMPLETED
Start: 2024-11-06 | End: 2024-11-06

## 2024-11-06 RX ORDER — OXYCODONE HYDROCHLORIDE 5 MG/1
2.5 TABLET ORAL ONCE
Status: COMPLETED | OUTPATIENT
Start: 2024-11-06 | End: 2024-11-06

## 2024-11-06 NOTE — PAYOR COMM NOTE
--------------  ADMISSION REVIEW     Payor: HAMZAH ROMAN  Subscriber #:  [id number]  Authorization Number: [id number]    Admit date: 11/5/24  Admit time:  9:33 PM       REVIEW DOCUMENTATION:     ED Provider Notes            Stated Complaint: CP, MAVIS, HR 35    Subjective:   HPI      59-year-old male presents today for evaluation.  Patient has a history of chronic chest pain and shortness of breath.  He followed with his cardiologist on October 14 and has an outpatient workup that is pending.  This evening, patient had 2 near syncopal episodes where he blankly stared off.  Recently, he has noted increasing bilateral leg swelling as well.  He denies any fevers or cough.      Physical Exam     ED Triage Vitals   BP 11/05/24 2010 136/61   Pulse 11/05/24 2010 (!) 34   Resp 11/05/24 2010 18   Temp --    Temp src 11/05/24 2200 Temporal   SpO2 11/05/24 2010 100 %   O2 Device 11/05/24 2010 None (Room air)       Current Vitals:   Vital Signs  BP: 126/67  Pulse: 83  Resp: 21  Temp src: Temporal  MAP (mmHg): 83    Oxygen Therapy  SpO2: 98 %  O2 Device: None (Room air)  Pulse Oximetry Type: Continuous  Oximetry Probe Site Changed: No  Pulse Ox Probe Location: Right hand        Physical Exam  Vitals and nursing note reviewed.   Constitutional:       Appearance: Normal appearance.   HENT:      Head: Normocephalic.      Nose: Nose normal.      Mouth/Throat:      Mouth: Mucous membranes are moist.   Eyes:      Extraocular Movements: Extraocular movements intact.   Cardiovascular:      Rate and Rhythm: Regular rhythm. Bradycardia present.   Pulmonary:      Effort: Pulmonary effort is normal.      Breath sounds: Normal breath sounds.   Abdominal:      General: Abdomen is flat.   Musculoskeletal:         General: Normal range of motion.      Right lower leg: Edema present.      Left lower leg: Edema present.   Skin:     General: Skin is warm.   Neurological:      General: No focal deficit present.      Mental Status: He is alert.    Psychiatric:         Mood and Affect: Mood normal.         ED Course     Labs Reviewed   CBC WITH DIFFERENTIAL WITH PLATELET - Abnormal; Notable for the following components:       Result Value    RBC 4.27 (*)     HGB 12.6 (*)     HCT 36.5 (*)     Neutrophil Absolute Prelim 8.11 (*)     Neutrophil Absolute 8.11 (*)     All other components within normal limits   COMP METABOLIC PANEL (14) - Abnormal; Notable for the following components:    Glucose 294 (*)     CO2 19.0 (*)     BUN 31 (*)     Creatinine 1.50 (*)     Calculated Osmolality 301 (*)     eGFR-Cr 53 (*)     All other components within normal limits   PRO BETA NATRIURETIC PEPTIDE - Abnormal; Notable for the following components:    Pro-Beta Natriuretic Peptide 149 (*)     All other components within normal limits   EKG    Rate, intervals and axes as noted on EKG Report.  Rate: 34  Rhythm: Complete heart block  Reading: Complete heart block      XR CHEST AP PORTABLE  (CPT=71045)    Result Date: 11/5/2024  PROCEDURE:  XR CHEST AP PORTABLE  (CPT=71045)  TECHNIQUE:  AP chest radiograph was obtained.  COMPARISON:  [facility] , XR, XR CHEST AP PORTABLE  (CPT=71045), 5/14/2024, 9:47 AM.  INDICATIONS:  CP, MAVIS, HR 35  PATIENT STATED HISTORY: (As transcribed by Technologist)  Patient complains of mid chest pain radiating to the left along with cough and shortness of breath.    FINDINGS:  Cardiomediastinal silhouette is stable in size and appearance.  Persistent elevation of the right hemidiaphragm.  No focal consolidation, pleural effusion, or pneumothorax.            CONCLUSION:  No focal consolidation.   LOCATION:  [place]      Dictated by (CST): Elina Banegas MD on 11/05/2024 at 8:47 PM     Finalized by (CST): Elina Banegas MD on 11/05/2024 at 8:48 PM            MDM      Differential Diagnosis  59-year-old male presents today for evaluation of for may have been to near syncopal episodes.  On arrival, patient appears to be in a complete heart block.  He is mentating well  and hemodynamically stable.  Will obtain labs assess for signs of myocardial ischemia, cardiac decompensation or electrolyte abnormality.  Reviewed case with Dr. Andrews of cardiology.  He recommended starting patient on dopamine and admitting the CICU for continued close monitoring.  Will observe    8:25 pm  Prior to starting any medication, patient appears to have converted into sinus rhythm.    EKG    Rate, intervals and axes as noted on EKG Report.  Rate: 40s  Rhythm: sinus beck with 1st degree block vs complete HB  Reading: no stemi    Patient is frequently alternating from sinus rhythm to a complete heart block.  He remains hemodynamically stable.  Will continue observe while awaiting workup    8:56 pm  Throughout his ED course, patient would convert from sinus to a complete heart block.  Will admit to the cardiac ICU for continued close monitoring.  I spoke with the hospitalist in regards admission.    A total of 35 minutes of critical care time (exclusive of billable procedures) was administered to manage the patient's cardiovascular instability due to his complete heart block.  This involved direct patient intervention, complex decision making, and/or extensive discussions with the patient, family, and clinical staff.    External Chart Reviewed  I reviewed the cardiology note from October 14    Discussions of Management  Case reviewed with cardiology along with the hospitalist    Admission disposition: 11/5/2024  8:56 PM      Medical Decision Making      Disposition and Plan     Clinical Impression:  1. Complete heart block (HCC)         Disposition:  Admit  11/5/2024  8:56 pm          11/6 CARDIOLOGY:    Impression:  1. pre-syncope--> complete heart block/bifascicular block, ventricular rate 30s; stabilized on dopamine gtt     2. hx of pulmonary sarcoidosis, cMRI '21 ([facility]) with mid LGE basal infero-lateral wall (possible cardiac sarcoidosis pattern)     3. mild CM LVEF 50-55%, GDMT limited by  hypotension     4. chronic CP--> hx PCI to LAD, distal LCx disease medically managed     5. DM2/HL     6. bladder Ca s/p TURBT/neobladder ([facility]), complicated by MRSA sepsis 5/24 ([facility])     Recommendations:  - CHB: discussed with EP, to see today. stable on dopamine  gtt- continue.  - hold all heparin products  - npo for now  - f/u TTE           History of Present Illness:  Yunior Garrett is a 59 year old male who presented to [facility] on 11/5/2024.     Seen in cardiology consultation for symptomatic CHB. Hx of pulmonary sarcoidosis with prior cardiac MRI ('21, [facility]) that shows mid-myocardial LGE (possibly suggestive of cardiac involvement), +bifascicular block, CAD s/p PCI to LAD, LVEF 50-55%.      Presents to ER last night with two episodes of nearly passing out.  ECG/telemetry showed SR with complete heart block, ventricular rate 30s.  Started dobutamine and pt has responded with 1:1 AV conduction, normotensive and stable symptoms overnight.  Recently admitted to [facility] with MRSA bacteremia/sepsis following TURBT/jeovany-bladder a few months earlier at [facility].  LVEF 50-55% at that time, GDMT limited by BP; follows with [facility] cardiology.                 MEDICATIONS ADMINISTERED IN LAST 1 DAY:  DOPamine in dextrose 5% (Intropin) 800 mg/250mL infusion premix       Date Action Dose Route User    11/6/2024 0600 Rate/Dose Verify 5 mcg/kg/min × 72.6 kg Intravenous Vicky Braun RN    11/6/2024 0400 Rate/Dose Verify 5 mcg/kg/min × 72.6 kg Intravenous Vicky Braun RN    11/6/2024 0200 Rate/Dose Verify 5 mcg/kg/min × 72.6 kg Intravenous Vicky Braun RN    11/6/2024 0000 Rate/Dose Verify 5 mcg/kg/min × 72.6 kg Intravenous Vicky Braun RN    11/5/2024 2300 Rate/Dose Verify 5 mcg/kg/min × 72.6 kg Intravenous Vicky Braun RN    11/5/2024 2200 Rate/Dose Verify 5 mcg/kg/min × 72.6 kg Intravenous Vicky Braun RN    11/5/2024 2051 New Bag 5 mcg/kg/min × 72.6 kg Intravenous Riding, Claire, RN          insulin aspart  (NovoLOG) 100 Units/mL FlexPen 2-10 Units       Date Action Dose Route User    11/6/2024 1216 Given 2 Units Subcutaneous (Left Upper Arm) Reynaldo Landa RN            Vitals (last day)       Date/Time Temp Pulse Resp BP SpO2 Weight O2 Device O2 Flow Rate (L/min) [facility]    11/06/24 1200 -- 67 18 134/74 98 % -- -- -- MM    11/06/24 1100 -- 70 20 124/66 97 % -- -- -- MM    11/06/24 1000 -- 66 20 124/66 99 % -- -- -- MM    11/06/24 0900 -- 73 22 129/66 100 % -- -- -- MM    11/06/24 0800 98.4 °F (36.9 °C) 65 20 119/68 95 % -- -- -- MM    11/06/24 0700 -- 65 20 120/72 97 % -- -- -- NOEMI    11/06/24 0600 -- 63 19 132/72 95 % -- -- --     11/06/24 0500 -- 65 22 125/68 96 % -- -- --     11/06/24 0400 97.5 °F (36.4 °C) 66 21 120/66 95 % -- None (Room air) --     11/06/24 0300 -- 74 23 130/72 92 % -- -- --     11/06/24 0200 -- 73 25 125/69 96 % -- -- --     11/06/24 0100 -- 69 22 120/61 96 % -- -- --     11/06/24 0000 97.8 °F (36.6 °C) 83 21 126/67 98 % -- None (Room air) --     11/05/24 2300 -- 77 22 135/62 97 % -- -- --     11/05/24 2247 -- -- -- -- -- 173 lb 1 oz (78.5 kg) -- --     11/05/24 2200 97.4 °F (36.3 °C) 90 25 98/71 98 % -- None (Room air) --     11/05/24 2100 -- 84 20 135/83 100 % -- None (Room air) --     11/05/24 2057 -- 84 20 -- 100 % -- -- --     11/05/24 2051 -- 26 16 -- -- -- -- --     11/05/24 2045 -- 81 25 139/79 100 % -- -- --     11/05/24 2030 -- 81 16 134/75 100 % -- None (Room air) --     11/05/24 2013 -- -- -- -- -- 160 lb (72.6 kg) -- --     11/05/24 2010 -- 34 18 136/61 100 % -- None (Room air) --

## 2024-11-06 NOTE — ED QUICK NOTES
This RN called into room with EDMD after patient experienced sinus pause and HR decreased into 30's again. Per EDMD start dopamine lowest dose as patient has decreased to 30's and went back to sinus twice now. Patient A+OX4 at this time.

## 2024-11-06 NOTE — PROCEDURES
Defibrillator Implantation    History:  59 year old male with cardiac sarcoidosis and CHB with cMRI confirmed scar who presents for a DCICD implant based on AHA/ACC appropriateness criteria. The risks and benefits of the procedure (including, but not limited to, hematoma, infection, pneumothorax, tamponade, renal failure from IV dye, damage to any existing leads, myocardial infarction, stroke, and death) were discussed. The patient understands and agrees to proceed.    Procedure:  The patient spinal stimulator was turned off.    The patient was brought to the electrophysiology laboratory in a fasting and nonsedated state. The left chest was prepped and draped in the usual sterile fashion. Ancef was given for antibiotic prophylaxis. Fentanyl and versed were administered in divided doses under continuous monitoring of oxygenation, blood pressure, and heart rate.  Starting sedation time was 1655.  Procedure end time was 1729.  6 of versed and 200 of fentanyl were given for sedation. 1% lidocaine was used for skin anesthesia. A left upper extremity venogram was performed by the administration of 10cc of IV dye into the arm. The cephalic, axillary, and subclavian veins were patent. An incision was made below the left clavicle with a scalpel and a pocket was created using sharp and blunt dissection. Access to the left subclavian vein was obtained two times via the extrathoracic approach guided by the venogram. The RV lead was placed in the RV apical septum. The RA lead was placed in the RAA region. Lead data was evaluated as below. Pacing was performed at 10V on both leads to ensure no direct diaphragmatic or phrenic nerve stimulation. The leads were secured to the underlying fascia with 0-Ethibond suture. The pocket was irrigated with antibiotic solution. The leads were connected to the new pulse generator and placed in the pocket via a tyrex.     The patient spinal stimulator was then turned on and increased to maximum  output.  There was no noise on the device when being interrogated.  It was turned off, turned back on and increased from level 1 to level 7 (maximum). There was no noise on the leads the entire time the stimulator was manipulated.  It was turned back off again.    The pocket was closed in layers with 2-0 Vicryl for the deep fascia and 4-0 Vicryl for the skin. The wound was dressed and the left arm was placed in a sling. Fluoroscopy time was 4 minutes.    Device data:           Model Number Serial Number Sensing(mV) Impedance Pacing   Generator PayRange [id number]      RA  [place] [id number] 670 1.0 @ 0.5ms   RV ( [place] 0672 [id number] 9.8 570 0.5 @ 0.5ms                           Conclusions:  Successful dual chamber defibrillator implant  Adequate RA and RV sensing and pacing thresholds      Ranjit Hammond MD  Clinical Cardiac Electrophysiology  [facility]

## 2024-11-06 NOTE — PLAN OF CARE
Received pt. From ED at 2200. Pt. A & O x4. VSS - NSR. Dopamine infusing. Cardiology updated, NPO after 0600 on 11/6, continue dopamine at low dose. Pt. Self straight caths. Spouse at bedside. Will continue to monitor closely.

## 2024-11-06 NOTE — ED QUICK NOTES
Started dopamine at 5mcg/kg/min and patient is now back into sinus at 84bpm. Per edmd stay on dopamine d/t previous encounters with block.

## 2024-11-06 NOTE — ED QUICK NOTES
Patient wife called RN into room stating \"having one of his episodes again\", upon assessment patient HR jumped into 80's and patient states \"everything is spinning, don't feel great now\". At this time patient is staring off and slow to respond. EDMD aware of change. New EKG ordered, per EDMD hold off on dopamine at this time.

## 2024-11-06 NOTE — ED INITIAL ASSESSMENT (HPI)
Pt arrives to ed w c/o CP and SOB 1 hr pta. Family reports pt had a \"spacing out\" episode. Denies total LOC.  Pt check hr on pulse ox at home and noted to have a low hr. Pt denies hr ever being this low. Pt denies pain radiating anywhere. Pt reports he always has chest pain.

## 2024-11-06 NOTE — H&P
.CC:   Chief Complaint   Patient presents with    Chest Pain Angina    Difficulty Breathing        PCP: Antione Valadez DO    History of Present Illness: Patient is a 59 year old male with PMH sig for htn, dm, ckd 2, sarcoidosis who presented with 2 near syncopal episodes last night.  Patient also noted that he was feeling more acutely short of breath on top of chronic dyspnea that he has.  He has sarcoidosis only known to be in his lungs.  Was found to be in complete heart block on arrival to emergency room.  Was placed on dopamine drip and in cardiac care unit with stabilization of heart rhythm.      PMH  Past Medical History:    Back problem    Benign essential HTN    Calculus of kidney    Cancer (HCC)    Melanoma    Diabetes (HCC)    High blood pressure    High cholesterol    Mixed hyperlipidemia    Osteoarthritis    Problems with swallowing    Renal disorder    CKD Stage2    Sarcoidosis    Sarcoidosis of lung (HCC)    Shortness of breath    Sleep apnea    Pt use CPAP at night    Type 2 diabetes mellitus without complication, without long-term current use of insulin (HCC)        PSH  Past Surgical History:   Procedure Laterality Date    Cath bare metal stent (bms)  10/2019    Colonoscopy N/A 9/29/2020    Procedure: COLONOSCOPY;  Surgeon: David Jo MD;  Location:  ENDOSCOPY    Other  07/2019    Left elbow surgery    Other surgical history  06/22/2022    Osteophytectomy cervical 2, cervical 3, cervical 4, cervical 5         ALL:  Allergies[1]     Home Medications:  Medications Taking[2]      Soc Hx  Social History     Tobacco Use    Smoking status: Never    Smokeless tobacco: Never   Substance Use Topics    Alcohol use: Never        Fam Hx  Family History   Problem Relation Age of Onset    Diabetes Father     Hypertension Father     Heart Disorder Father     Lipids Father     Cancer Mother         Liver    Diabetes Mother     Heart Disorder Mother     Hypertension Mother     Lipids Mother         Review of Systems  Comprehensive ROS reviewed and negative except for what's stated above.       OBJECTIVE:  /72   Pulse 65   Temp 97.5 °F (36.4 °C) (Temporal)   Resp 20   Wt 173 lb 1 oz (78.5 kg)   SpO2 97%   BMI 28.80 kg/m²     Gen- NAD, appears stated age  HEENT- NCAT, anicteric sclera, MMM, OP clear  Lymph- no cervical LAD  CV- RRR no murmurs. No LIDYA  Lungs- CTAB, good respiratory effort  Abd- soft, ntnd, no organomegaly, BS+  Derm- no rashes  MSK- good muscle strength and tone, no joint swelling  Neuro- A&OX3, no focal deficits      Diagnostic Data:    CBC/Chem  Recent Labs   Lab 11/05/24 2011   WBC 10.4   HGB 12.6*   MCV 85.5   .0   INR 1.00       Recent Labs   Lab 11/05/24 2011      K 3.8      CO2 19.0*   BUN 31*   CREATSERUM 1.50*   *   CA 9.1   MG 1.9       Recent Labs   Lab 11/05/24 2011   ALT 14   AST 15   ALB 4.8       No results for input(s): \"TROP\" in the last 168 hours.    Additional Diagnostics: personally reviewed EKG- rbbb, lafb, complete heart block    CXR: image personally reviewed- clear    Radiology: XR CHEST AP PORTABLE  (CPT=71045)    Result Date: 11/5/2024  PROCEDURE:  XR CHEST AP PORTABLE  (CPT=71045)  TECHNIQUE:  AP chest radiograph was obtained.  COMPARISON:  [facility] , [facility], XR CHEST AP PORTABLE  (CPT=71045), 5/14/2024, 9:47 AM.  INDICATIONS:  CP, MAVIS, HR 35  PATIENT STATED HISTORY: (As transcribed by Technologist)  Patient complains of mid chest pain radiating to the left along with cough and shortness of breath.    FINDINGS:  Cardiomediastinal silhouette is stable in size and appearance.  Persistent elevation of the right hemidiaphragm.  No focal consolidation, pleural effusion, or pneumothorax.            CONCLUSION:  No focal consolidation.   LOCATION:  [facility]      Dictated by (CST): Elina Banegas MD on 11/05/2024 at 8:47 PM     Finalized by (CST): Elina Banegas MD on 11/05/2024 at 8:48 PM          Available outpatient records  reviewed--    ASSESSMENT / PLAN:     59-year-old man with episodes of near syncope and who has a history of sarcoidosis, found to be in complete heart block    Complete heart block  - Discussed with cardiology  - On dopamine drip  - EP to see patient and plan pacemaker  - Echo ordered    Sarcoidosis  - has only been known in lungs but concern for cardiac involvement given CHB presentation    DM  - SSI      **Certification      PHYSICIAN Certification of Need for Inpatient Hospitalization - Initial Certification    Patient will require inpatient services that will reasonably be expected to span two midnight's based on the clinical documentation in H+P.   Based on patients current state of illness, I anticipate that, after discharge, patient will require TBD.      Jeanette Cabrera MD  [facility] Hospitalist  Pager [phone]  Answering Service number: [phone]         [1] No Known Allergies  [2]   Outpatient Medications Marked as Taking for the 11/5/24 encounter (Hospital Encounter)   Medication Sig Dispense Refill    aspirin 81 MG Oral Tab Take 1 tablet (81 mg total) by mouth daily.      SEMGLEE, YFGN, 100 UNIT/ML Subcutaneous Solution Pen-injector Inject 20 Units into the skin nightly.      Empagliflozin (JARDIANCE) 25 MG Oral Tab Take 1 tablet (25 mg total) by mouth every morning.      Vitamin D, Cholecalciferol, 25 MCG (1000 UT) Oral Cap Take 1 capsule by mouth every 30 (thirty) days.      rosuvastatin 20 MG Oral Tab Take 1 tablet (20 mg total) by mouth daily.      gabapentin 300 MG Oral Cap Take 1 capsule (300 mg total) by mouth 3 (three) times daily.

## 2024-11-06 NOTE — ED PROVIDER NOTES
Patient Seen in: [facility] Emergency Department      History     Chief Complaint   Patient presents with    Chest Pain Angina    Difficulty Breathing     Stated Complaint: CP, MAVIS, HR 35    Subjective:   HPI      59-year-old male presents today for evaluation.  Patient has a history of chronic chest pain and shortness of breath.  He followed with his cardiologist on October 14 and has an outpatient workup that is pending.  This evening, patient had 2 near syncopal episodes where he blankly stared off.  Recently, he has noted increasing bilateral leg swelling as well.  He denies any fevers or cough.    Objective:     Past Medical History:    Back problem    Benign essential HTN    Calculus of kidney    Cancer (HCC)    Melanoma    Diabetes (HCC)    High blood pressure    High cholesterol    Mixed hyperlipidemia    Osteoarthritis    Problems with swallowing    Renal disorder    CKD Stage2    Sarcoidosis    Sarcoidosis of lung (HCC)    Shortness of breath    Sleep apnea    Pt use CPAP at night    Type 2 diabetes mellitus without complication, without long-term current use of insulin (HCC)              Past Surgical History:   Procedure Laterality Date    Cath bare metal stent (bms)  10/2019    Colonoscopy N/A 9/29/2020    Procedure: COLONOSCOPY;  Surgeon: David Jo MD;  Location:  ENDOSCOPY    Other  07/2019    Left elbow surgery    Other surgical history  06/22/2022    Osteophytectomy cervical 2, cervical 3, cervical 4, cervical 5                 Social History     Socioeconomic History    Marital status:    Tobacco Use    Smoking status: Never    Smokeless tobacco: Never   Vaping Use    Vaping status: Never Used   Substance and Sexual Activity    Alcohol use: Never    Drug use: Never    Sexual activity: Yes     Partners: Male     Social Drivers of Health     Food Insecurity: No Food Insecurity (11/5/2024)    Food Insecurity     Food Insecurity: Never true   Transportation Needs:  No Transportation Needs (11/5/2024)    Transportation Needs     Lack of Transportation: No   Housing Stability: Low Risk  (11/5/2024)    Housing Stability     Housing Instability: No                  Physical Exam     ED Triage Vitals   BP 11/05/24 2010 136/61   Pulse 11/05/24 2010 (!) 34   Resp 11/05/24 2010 18   Temp --    Temp src 11/05/24 2200 Temporal   SpO2 11/05/24 2010 100 %   O2 Device 11/05/24 2010 None (Room air)       Current Vitals:   Vital Signs  BP: 126/67  Pulse: 83  Resp: 21  Temp src: Temporal  MAP (mmHg): 83    Oxygen Therapy  SpO2: 98 %  O2 Device: None (Room air)  Pulse Oximetry Type: Continuous  Oximetry Probe Site Changed: No  Pulse Ox Probe Location: Right hand        Physical Exam  Vitals and nursing note reviewed.   Constitutional:       Appearance: Normal appearance.   HENT:      Head: Normocephalic.      Nose: Nose normal.      Mouth/Throat:      Mouth: Mucous membranes are moist.   Eyes:      Extraocular Movements: Extraocular movements intact.   Cardiovascular:      Rate and Rhythm: Regular rhythm. Bradycardia present.   Pulmonary:      Effort: Pulmonary effort is normal.      Breath sounds: Normal breath sounds.   Abdominal:      General: Abdomen is flat.   Musculoskeletal:         General: Normal range of motion.      Right lower leg: Edema present.      Left lower leg: Edema present.   Skin:     General: Skin is warm.   Neurological:      General: No focal deficit present.      Mental Status: He is alert.   Psychiatric:         Mood and Affect: Mood normal.         ED Course     Labs Reviewed   CBC WITH DIFFERENTIAL WITH PLATELET - Abnormal; Notable for the following components:       Result Value    RBC 4.27 (*)     HGB 12.6 (*)     HCT 36.5 (*)     Neutrophil Absolute Prelim 8.11 (*)     Neutrophil Absolute 8.11 (*)     All other components within normal limits   COMP METABOLIC PANEL (14) - Abnormal; Notable for the following components:    Glucose 294 (*)     CO2 19.0 (*)     BUN  31 (*)     Creatinine 1.50 (*)     Calculated Osmolality 301 (*)     eGFR-Cr 53 (*)     All other components within normal limits   PRO BETA NATRIURETIC PEPTIDE - Abnormal; Notable for the following components:    Pro-Beta Natriuretic Peptide 149 (*)     All other components within normal limits   TROPONIN I HIGH SENSITIVITY - Normal   PROTHROMBIN TIME (PT) - Normal   PTT, ACTIVATED - Normal   MAGNESIUM - Normal   RAPID SARS-COV-2 BY PCR - Normal   RAINBOW DRAW LAVENDER   RAINBOW DRAW LIGHT GREEN   RAINBOW DRAW BLUE     EKG    Rate, intervals and axes as noted on EKG Report.  Rate: 34  Rhythm: Complete heart block  Reading: Complete heart block                XR CHEST AP PORTABLE  (CPT=71045)    Result Date: 11/5/2024  PROCEDURE:  XR CHEST AP PORTABLE  (CPT=71045)  TECHNIQUE:  AP chest radiograph was obtained.  COMPARISON:  [facility] , XR, XR CHEST AP PORTABLE  (CPT=71045), 5/14/2024, 9:47 AM.  INDICATIONS:  CP, MAVIS, HR 35  PATIENT STATED HISTORY: (As transcribed by Technologist)  Patient complains of mid chest pain radiating to the left along with cough and shortness of breath.    FINDINGS:  Cardiomediastinal silhouette is stable in size and appearance.  Persistent elevation of the right hemidiaphragm.  No focal consolidation, pleural effusion, or pneumothorax.            CONCLUSION:  No focal consolidation.   LOCATION:  [facility]      Dictated by (CST): Elina Banegas MD on 11/05/2024 at 8:47 PM     Finalized by (CST): Elina Banegas MD on 11/05/2024 at 8:48 PM             MDM      Differential Diagnosis  59-year-old male presents today for evaluation of for may have been to near syncopal episodes.  On arrival, patient appears to be in a complete heart block.  He is mentating well and hemodynamically stable.  Will obtain labs assess for signs of myocardial ischemia, cardiac decompensation or electrolyte abnormality.  Reviewed case with Dr. Andrews of cardiology.  He recommended starting patient on dopamine and admitting the  CICU for continued close monitoring.  Will observe    8:25 pm  Prior to starting any medication, patient appears to have converted into sinus rhythm.    EKG    Rate, intervals and axes as noted on EKG Report.  Rate: 40s  Rhythm: sinus beck with 1st degree block vs complete HB  Reading: no stemi    Patient is frequently alternating from sinus rhythm to a complete heart block.  He remains hemodynamically stable.  Will continue observe while awaiting workup    8:56 pm  Throughout his ED course, patient would convert from sinus to a complete heart block.  Will admit to the cardiac ICU for continued close monitoring.  I spoke with the hospitalist in regards admission.    A total of 35 minutes of critical care time (exclusive of billable procedures) was administered to manage the patient's cardiovascular instability due to his complete heart block.  This involved direct patient intervention, complex decision making, and/or extensive discussions with the patient, family, and clinical staff.    External Chart Reviewed  I reviewed the cardiology note from October 14    Discussions of Management  Case reviewed with cardiology along with the hospitalist    Admission disposition: 11/5/2024  8:56 PM           Medical Decision Making      Disposition and Plan     Clinical Impression:  1. Complete heart block (HCC)         Disposition:  Admit  11/5/2024  8:56 pm    Follow-up:  No follow-up provider specified.        Medications Prescribed:  Current Discharge Medication List              Supplementary Documentation:         Hospital Problems       Present on Admission  Date Reviewed: 4/5/2022            ICD-10-CM Noted POA    * (Principal) Complete heart block (HCC) I44.2 11/5/2024 Unknown

## 2024-11-06 NOTE — CONSULTS
----- Message from Watson Macias sent at 12/26/2019  3:34 PM CST -----  Type: Needs Medical Advice    Who Called:  Son/Watson Obando    [id number] - [place] [address] AT [facility] & [address] [address]  Phone: [phone] Fax: [phone]    [facility] Call Back Number: [phone]    Additional Information: Caller states that he would like a callback regarding orders for the patient's rehab and pain medications    HYDROcodone-acetaminophen (NORCO) 5-325 mg per tablet        I was asked by Dr. Martinez to evaluate for near syncope/LH    59 year old male with PMhx as below with CHB  Known pulm sarcoid, recent bladder CA and sepsis 5/24, made full recovery  Chronic Bifasic block and cMRI with scar consistent with cardiac sarcoid  Change in sx's in last couple of days --> new severe LOPEZ / LH  ECG with CHB and bifasic block.  No syncope    Started on dobutamine and conduction has improved 1:1    Has spine stimulator for chronic back pain.  It is bipolar.    Denies chest pain, shortness of breath, palpitations, lightheadedness, syncope, orthopnea, paroxysmal nocturnal dyspnea, or edema.    ROS: all other systems were reviewed and were negative    Past Medical History:    Back problem    Benign essential HTN    Calculus of kidney    Cancer (HCC)    Melanoma    Diabetes (HCC)    High blood pressure    High cholesterol    Mixed hyperlipidemia    Osteoarthritis    Problems with swallowing    Renal disorder    CKD Stage2    Sarcoidosis    Sarcoidosis of lung (HCC)    Shortness of breath    Sleep apnea    Pt use CPAP at night    Type 2 diabetes mellitus without complication, without long-term current use of insulin ([facility])       Allergies:  Allergies[1]    Medications:   insulin aspart  2-10 Units Subcutaneous TID AC and HS    [START ON 11/7/2024] chlorhexidine   Topical On Call    [START ON 11/7/2024] sodium chloride   Intravenous On Call    ceFAZolin  2 g Intravenous 30 Min Pre-Op       Social History     Tobacco Use    Smoking status: Never    Smokeless tobacco: Never   Substance Use Topics    Alcohol use: Never       Family history: no sudden cardiac death    Physical:  /74   Pulse 67   Temp 98.4 °F (36.9 °C) (Temporal)   Resp 18   Wt 173 lb 1 oz (78.5 kg)   SpO2 98%   BMI 28.80 kg/m²   Physical Exam:    General: NAD  Neck: No JVD  Lungs: CTA bilat  Heart: RRR, S1, S2  Abdomen: Soft, NT/ND, BS+x4  Back: No CVA tenderness  Extremities: Warm, dry, no LE edema bilat  Pulses: 2+ bilat  DP  Skin: no rashes or legions noted  Neurological:  AAOx3, MAEW  Psych: Appropriate affect and response to questions      Labs:    HEM:  Recent Labs   Lab 11/05/24 2011   WBC 10.4   HGB 12.6*   .0   PTT 30.0   INR 1.00       Chem:  Recent Labs   Lab 11/05/24 2011      K 3.8      CO2 19.0*   BUN 31*   MG 1.9   ALT 14   AST 15       Last Cardiac:  No results for input(s): \"CKMB\" in the last 168 hours.    Invalid input(s): \"CKTOTAL\", \"CKMBINDEX\", \"TROPONINI\"    Data:  ECG: (personally reviewed): NSR CHB RBBB/LAHB HR 30s  Tele: Sinus bifasic block HR 70's  Echo: Prelim (my read) EF 55-60%  cMRI:  1.  Negative for ischemia. Normal stress and rest perfusion.   2.  Normal left ventricular chamber size. LVEF 49 % with mild generalized   hypokinesis.   3. Similar nonischemic mid-myocardial delayed enhancement at the basal   inferolateral segment. Although non-specific, this could be due to cardiac   sarcoid or prior myocarditis.    4.  Normal right ventricular chamber size. RVEF 48 %.    5.  Trivial pericardial effusion. No abnormal pericardial enhancement.       Impression:  Sx'atic CHB  Bifasic block  Pulm sarcoid  cMRI consistent with cardiac sarcoid      Plan:  CHB - as above.  Patient likely with cardiac sarcoidosis (LGE in pattern consistent with cardiac sarcoid and conduction disease on baseline ECG) now with sx'atic CHB. Reviewed ACC/HRS position statement on devices in setting of cardiac sarcoidosis and we both agree an ICD is reasonable.  Will implant.    He has a spinal stimulator ([employer] brand). We contacted their technical reps and they informed us that his spinal stimulator can be used with an ICD and shouldn't cause cross talk.  They suggested turning it off for implant and turning it on after to look for electrical noise and if present simply turning off his stimulator going forward.  We have the stimulator remote for the case.  The risks (including, but not limited to, hematoma,  infection, pneumothorax, tamponade, renal failure from IV dye, damage to any existing leads, myocardial infarction, stroke, and death), benefits (pacing, protectoin from SCD), and alternatives (none) of the procedure were discussed. The patient understands and agrees to proceed.  uyen Hsu    > 35 min critical care time    Ranjit Hammond MD  Clinical Cardiac Electrophysiology  [facility]         [1] No Known Allergies

## 2024-11-06 NOTE — CONSULTS
[facility] Cardiology  Consultation Note      Yunior Garrett Patient Status:  Inpatient    1965 MRN [id number]   Location [facility] 6NE-A Attending Jeanette Cabrera MD   Hosp Day # 1 PCP Antione Valadez DO     Impression:  1. pre-syncope--> complete heart block/bifascicular block, ventricular rate 30s; stabilized on dopamine gtt    2. hx of pulmonary sarcoidosis, cMRI  ([place] [facility]) with mid LGE basal infero-lateral wall (possible cardiac sarcoidosis pattern)    3. mild CM LVEF 50-55%, GDMT limited by hypotension    4. chronic CP--> hx PCI to LAD, distal LCx disease medically managed    5. DM2/HL    6. bladder Ca s/p TURBT/neobladder ([facility]), complicated by MRSA sepsis  ([facility])    Recommendations:  - CHB: discussed with EP, to see today. stable on dopamine  gtt- continue.  - hold all heparin products  - npo for now  - f/u TTE    CC time 30 minutes.    History of Present Illness:  Yunior Garrett is a 59 year old male who presented to [facility] on 2024.    Seen in cardiology consultation for symptomatic CHB. Hx of pulmonary sarcoidosis with prior cardiac MRI (, [facility]) that shows mid-myocardial LGE (possibly suggestive of cardiac involvement), +bifascicular block, CAD s/p PCI to LAD, LVEF 50-55%.     Presents to ER last night with two episodes of nearly passing out.  ECG/telemetry showed SR with complete heart block, ventricular rate 30s.  Started dobutamine and pt has responded with 1:1 AV conduction, normotensive and stable symptoms overnight.  Recently admitted to [facility] with MRSA bacteremia/sepsis following TURBT/jeovany-bladder a few months earlier at [facility].  LVEF 50-55% at that time, GDMT limited by BP; follows [facility].      Medications:  Current Facility-Administered Medications   Medication Dose Route Frequency    DOPamine in dextrose 5% (Intropin) 800 mg/250mL infusion premix  2-20 mcg/kg/min Intravenous Continuous       Past Medical History:    Back problem     Benign essential HTN    Calculus of kidney    Cancer (HCC)    Melanoma    Diabetes (HCC)    High blood pressure    High cholesterol    Mixed hyperlipidemia    Osteoarthritis    Problems with swallowing    Renal disorder    CKD Stage2    Sarcoidosis    Sarcoidosis of lung (HCC)    Shortness of breath    Sleep apnea    Pt use CPAP at night    Type 2 diabetes mellitus without complication, without long-term current use of insulin (HCC)       Past Surgical History:   Procedure Laterality Date    Cath bare metal stent (bms)  10/2019    Colonoscopy N/A 2020    Procedure: COLONOSCOPY;  Surgeon: David Jo MD;  Location:  ENDOSCOPY    Other  2019    Left elbow surgery    Other surgical history  2022    Osteophytectomy cervical 2, cervical 3, cervical 4, cervical 5        Family History  family history includes Cancer in his mother; Diabetes in his father and mother; Heart Disorder in his father and mother; Hypertension in his father and mother; Lipids in his father and mother.    Social History   reports that he has never smoked. He has never used smokeless tobacco. He reports that he does not drink alcohol and does not use drugs.     Allergies  Allergies[1]      Review of Systems:  Constitutional: negative for fevers  Eyes: negative for visual disturbance  Ears, nose, mouth, throat, and face: negative for epistaxis  Respiratory: negative for dyspnea on exertion  Cardiovascular: negative for chest pain  Gastrointestinal: negative for melena  Genitourinary:negative for hematuria  Hematologic/lymphatic: negative for bleeding  Musculoskeletal:negative for myalgias  Neurological: negative for dizziness and headaches  Endocrine: negative for temperature intolerance      Physical Exam:  Blood pressure 120/72, pulse 65, temperature 97.5 °F (36.4 °C), temperature source Temporal, resp. rate 20, weight 173 lb 1 oz (78.5 kg), SpO2 97%.  Temp (24hrs), Av.6 °F (36.4 °C), Min:97.4 °F (36.3 °C),  Max:97.8 °F (36.6 °C)    Wt Readings from Last 3 Encounters:   11/05/24 173 lb 1 oz (78.5 kg)   05/22/24 164 lb (74.4 kg)   06/22/22 170 lb (77.1 kg)       General: Awake and alert; in no acute distress  HEENT: Extraocular movements are intact; sclerae are anicteric; scalp is atrauamatic; no thyromegaly  Neck: Supple; no JVD; no carotid bruits  Cardiac: Regular rate and regular rhythm; no murmurs/rubs/gallops are appreciated; PMI is non-displaced; there is no evidence of a sternal heave  Lungs: Clear to auscultation bilaterally; no accessory muscle use is noted  Abdomen: Soft, non-tender; bowel sounds are normoactive; no hepatosplenomegaly  Extremities: No clubbing or cyanosis; moves all 4 extremities normally  Psychiatric: Normal mood and affect; answers questions appropriately  Dermatologic: No rashes; normal skin turgor    Diagnostic testing:    EKG: Normal sinus rhythm    Labs:   Lab Results   Component Value Date    INR 1.00 11/05/2024    INR 1.16 05/14/2024        Lab Results   Component Value Date    WBC 10.4 11/05/2024    HGB 12.6 11/05/2024    HCT 36.5 11/05/2024    .0 11/05/2024    CREATSERUM 1.50 11/05/2024    BUN 31 11/05/2024     11/05/2024    K 3.8 11/05/2024     11/05/2024    CO2 19.0 11/05/2024     11/05/2024    CA 9.1 11/05/2024    ALB 4.8 11/05/2024    ALKPHO 110 11/05/2024    BILT 0.4 11/05/2024    TP 8.2 11/05/2024    AST 15 11/05/2024    ALT 14 11/05/2024    PTT 30.0 11/05/2024    INR 1.00 11/05/2024    PTP 13.3 11/05/2024    MG 1.9 11/05/2024         Thank you for allowing our practice to participate in the care of your patient. Please do not hesitate to contact me if you have any questions.    Adarsh Martinez MD  11/6/2024  8:53 AM         [1] No Known Allergies

## 2024-11-07 ENCOUNTER — APPOINTMENT (OUTPATIENT)
Dept: GENERAL RADIOLOGY | Facility: HOSPITAL | Age: 59
End: 2024-11-07
Attending: INTERNAL MEDICINE
Payer: COMMERCIAL

## 2024-11-07 VITALS
DIASTOLIC BLOOD PRESSURE: 70 MMHG | WEIGHT: 173.06 LBS | OXYGEN SATURATION: 100 % | RESPIRATION RATE: 21 BRPM | SYSTOLIC BLOOD PRESSURE: 130 MMHG | HEART RATE: 77 BPM | BODY MASS INDEX: 29 KG/M2 | TEMPERATURE: 97 F

## 2024-11-07 LAB
ANION GAP SERPL CALC-SCNC: 6 MMOL/L (ref 0–18)
ANION GAP SERPL CALC-SCNC: 6 MMOL/L (ref 0–18)
BUN BLD-MCNC: 31 MG/DL (ref 9–23)
BUN BLD-MCNC: 35 MG/DL (ref 9–23)
CALCIUM BLD-MCNC: 10 MG/DL (ref 8.7–10.4)
CALCIUM BLD-MCNC: 9.1 MG/DL (ref 8.7–10.4)
CHLORIDE SERPL-SCNC: 110 MMOL/L (ref 98–112)
CHLORIDE SERPL-SCNC: 114 MMOL/L (ref 98–112)
CO2 SERPL-SCNC: 14 MMOL/L (ref 21–32)
CO2 SERPL-SCNC: 19 MMOL/L (ref 21–32)
CREAT BLD-MCNC: 1.22 MG/DL
CREAT BLD-MCNC: 1.5 MG/DL
EGFRCR SERPLBLD CKD-EPI 2021: 53 ML/MIN/1.73M2 (ref 60–?)
EGFRCR SERPLBLD CKD-EPI 2021: 67 ML/MIN/1.73M2 (ref 60–?)
ERYTHROCYTE [DISTWIDTH] IN BLOOD BY AUTOMATED COUNT: 13.3 %
GLUCOSE BLD-MCNC: 131 MG/DL (ref 70–99)
GLUCOSE BLD-MCNC: 145 MG/DL (ref 70–99)
GLUCOSE BLD-MCNC: 371 MG/DL (ref 70–99)
HCT VFR BLD AUTO: 36.6 %
HGB BLD-MCNC: 12.8 G/DL
LACTATE SERPL-SCNC: 1.5 MMOL/L (ref 0.5–2)
MCH RBC QN AUTO: 29.6 PG (ref 26–34)
MCHC RBC AUTO-ENTMCNC: 35 G/DL (ref 31–37)
MCV RBC AUTO: 84.5 FL
OSMOLALITY SERPL CALC.SUM OF ELEC: 287 MOSM/KG (ref 275–295)
OSMOLALITY SERPL CALC.SUM OF ELEC: 303 MOSM/KG (ref 275–295)
PLATELET # BLD AUTO: 213 10(3)UL (ref 150–450)
POTASSIUM SERPL-SCNC: 3.9 MMOL/L (ref 3.5–5.1)
POTASSIUM SERPL-SCNC: 4 MMOL/L (ref 3.5–5.1)
RBC # BLD AUTO: 4.33 X10(6)UL
SODIUM SERPL-SCNC: 134 MMOL/L (ref 136–145)
SODIUM SERPL-SCNC: 135 MMOL/L (ref 136–145)
WBC # BLD AUTO: 10.3 X10(3) UL (ref 4–11)

## 2024-11-07 PROCEDURE — 94799 UNLISTED PULMONARY SVC/PX: CPT

## 2024-11-07 PROCEDURE — 80048 BASIC METABOLIC PNL TOTAL CA: CPT | Performed by: HOSPITALIST

## 2024-11-07 PROCEDURE — 82962 GLUCOSE BLOOD TEST: CPT

## 2024-11-07 PROCEDURE — 85027 COMPLETE CBC AUTOMATED: CPT | Performed by: HOSPITALIST

## 2024-11-07 PROCEDURE — 71046 X-RAY EXAM CHEST 2 VIEWS: CPT | Performed by: INTERNAL MEDICINE

## 2024-11-07 PROCEDURE — 83605 ASSAY OF LACTIC ACID: CPT | Performed by: HOSPITALIST

## 2024-11-07 PROCEDURE — 51701 INSERT BLADDER CATHETER: CPT

## 2024-11-07 NOTE — PLAN OF CARE
A/Ox4, states soreness to L chest pacemaker site, declined medication. Site covered with mepilex, WDL. L arm sling in place, precautions reviewed. CXR completed. Ok to dc home per cardiology. Hospitalist notified, repeat BMP completed and results paged to hospitalist, ok to dc home. PIVs dc'd. Spouse assisted pt to dress and collect belongings. Dc instructions reviewed with pt and spouse including follow up, activity restrictions, complication recognition. Pt escorted to family vehicle in .

## 2024-11-07 NOTE — PROGRESS NOTES
[facility] Cardiology  Progress Note    Yunior Garrett Patient Status:  Inpatient    1965 MRN [id number]   Location [facility] 6NE-A Attending Jeanette Cabrera MD   Hosp Day # 2 PCP Antione Valadez DO       Impression:  1. pre-syncope--> complete heart block/bifascicular block, ventricular rate 30s--> dual chamber BS ICD (24, Man[place])   - TTE (24): LVEF 50-55%.    2. hx of pulmonary sarcoidosis, cMRI '21 ([place]) with mid LGE basal infero-lateral wall (possible cardiac sarcoidosis pattern)     3. mild CM LVEF 50-55%, GDMT limited by hypotension     4. chronic CP--> hx PCI to LAD, distal LCx disease medically managed     5. DM2/HL     6. bladder Ca s/p TURBT/neobladder ([facility]), complicated by MRSA sepsis  ([facility])     Recommendations:  - CHB: s/p DCICD. device pocket site- benign.  Nl device interrogation CXR today.  post PM implant instructions.  establish in device clinic.  - pt lives in [place], would like to f/u locally. Will arrange for f/u appts.  - asa, rosuvastatin.    - ok for dc from cv standpoint.    Subjective:  The patient denies any chest pain or dyspnea at this time. Shoulder is mildly sore.    Objective:  /76 (BP Location: Right arm)   Pulse 70   Temp 97 °F (36.1 °C) (Temporal)   Resp 13   Wt 173 lb 1 oz (78.5 kg)   SpO2 98%   BMI 28.80 kg/m²   Temp (24hrs), Av.3 °F (36.3 °C), Min:96.5 °F (35.8 °C), Max:98.2 °F (36.8 °C)      Intake/Output Summary (Last 24 hours) at 2024 0917  Last data filed at 2024 0000  Gross per 24 hour   Intake 350 ml   Output 1250 ml   Net -900 ml     Wt Readings from Last 3 Encounters:   24 173 lb 1 oz (78.5 kg)   24 164 lb (74.4 kg)   22 170 lb (77.1 kg)       General: Awake and alert; in no acute distress  Cardiac: Regular rate and regular rhythm; no murmurs/rubs/gallops are appreciated. PM pocket- dressing in place.  No sig tenderness/fluctuance underneath.    Lungs: Clear to auscultation  bilaterally; no accessory muscle use  Abdomen: Soft, non-tender; bowel sounds are normoactive  Extremities: No clubbing/cyanosis; moves all 4 extremities normally    Current Facility-Administered Medications   Medication Dose Route Frequency    acetaminophen (Tylenol Extra Strength) tab 500 mg  500 mg Oral Q4H PRN    ondansetron (Zofran) 4 MG/2ML injection 4 mg  4 mg Intravenous Q6H PRN    prochlorperazine (Compazine) 10 MG/2ML injection 5 mg  5 mg Intravenous Q8H PRN    polyethylene glycol (PEG 3350) (Miralax) 17 g oral packet 17 g  17 g Oral Daily PRN    sennosides (Senokot) tab 17.2 mg  17.2 mg Oral Nightly PRN    bisacodyl (Dulcolax) 10 MG rectal suppository 10 mg  10 mg Rectal Daily PRN    fleet enema (Fleet) rectal enema 133 mL  1 enema Rectal Once PRN    glucose (Dex4) 15 GM/59ML oral liquid 15 g  15 g Oral Q15 Min PRN    Or    glucose (Glutose) 40% oral gel 15 g  15 g Oral Q15 Min PRN    Or    glucose-vitamin C (Dex-4) chewable tab 4 tablet  4 tablet Oral Q15 Min PRN    Or    dextrose 50% injection 50 mL  50 mL Intravenous Q15 Min PRN    Or    glucose (Dex4) 15 GM/59ML oral liquid 30 g  30 g Oral Q15 Min PRN    Or    glucose (Glutose) 40% oral gel 30 g  30 g Oral Q15 Min PRN    Or    glucose-vitamin C (Dex-4) chewable tab 8 tablet  8 tablet Oral Q15 Min PRN    insulin aspart (NovoLOG) 100 Units/mL FlexPen 2-10 Units  2-10 Units Subcutaneous TID AC and HS    ondansetron (Zofran) 4 MG/2ML injection 4 mg  4 mg Intravenous Q6H PRN    DOPamine in dextrose 5% (Intropin) 800 mg/250mL infusion premix  2-20 mcg/kg/min Intravenous Continuous       Laboratory Data:  Lab Results   Component Value Date    WBC 10.3 11/07/2024    HGB 12.8 11/07/2024    HCT 36.6 11/07/2024    .0 11/07/2024     Lab Results   Component Value Date    INR 1.00 11/05/2024    INR 1.16 05/14/2024     Lab Results   Component Value Date     11/07/2024    K 3.9 11/07/2024     11/07/2024    CO2 14.0 11/07/2024    BUN 31 11/07/2024     IRAERUM 1.22 11/07/2024     11/07/2024    CA 10.0 11/07/2024       Telemetry: SR      Thank you for allowing our practice to participate in the care of your patient. Please do not hesitate to contact me if you have any questions.

## 2024-11-07 NOTE — PLAN OF CARE
Assumed care of Mark since 1930; s/p PPM during 11/16. Initially drowsy, now alert/oriented x4. NSR, at one time with paced beats. Breathing easy w/ sats >90% in CPAP at night. L upper chest mepilex dressing is intact. Reinforced L arm sling & not to raise LE above shoulder. Oxycodone x 1 given for pain w/ relief. Anticipating transfer or discharge home today.  0640- No acute event overnight. Up in the bathroom.

## 2024-11-07 NOTE — PLAN OF CARE
Pt return for Cath lab s/p perm pace maker. Spinal stimulator remote given back to wife. Mepilex dressing on left chest area. Sling on. +2 pulses, and hand warm. Pt sedated. Did REGALADO. Vital signs stable. Plan of care reviewed with spouse.

## 2024-11-07 NOTE — PLAN OF CARE
Assumed care of Mark since 1930; s/p PPM during 11/16. Initially drowsy, now alert/oriented x4. NSR, at one time with paced beats. Breathing easy w/ sats >90% in CPAP at night. L upper chest mepilex dressing is intact. Reinforced L arm sling & not to raise LE above shoulder. Oxycodone x 1 given for pain w/ relief. Anticipating transfer or discharge home today.

## 2024-11-07 NOTE — PAYOR COMM NOTE
--------------  11/6:  CONTINUED STAY REVIEW    Payor: HAMZAH ROMAN  Subscriber #:  [id number]  Authorization Number: [id number]    Admit date: 11/5/24  Admit time:  9:33 PM      Defibrillator Implantation     History:  59 year old male with cardiac sarcoidosis and CHB with cMRI confirmed scar who presents for a DCICD implant based on AHA/ACC appropriateness criteria. The risks and benefits of the procedure (including, but not limited to, hematoma, infection, pneumothorax, tamponade, renal failure from IV dye, damage to any existing leads, myocardial infarction, stroke, and death) were discussed. The patient understands and agrees to proceed.     Procedure:  The patient spinal stimulator was turned off.     The patient was brought to the electrophysiology laboratory in a fasting and nonsedated state. The left chest was prepped and draped in the usual sterile fashion. Ancef was given for antibiotic prophylaxis. Fentanyl and versed were administered in divided doses under continuous monitoring of oxygenation, blood pressure, and heart rate.  Starting sedation time was 1655.  Procedure end time was 1729.  6 of versed and 200 of fentanyl were given for sedation. 1% lidocaine was used for skin anesthesia. A left upper extremity venogram was performed by the administration of 10cc of IV dye into the arm. The cephalic, axillary, and subclavian veins were patent. An incision was made below the left clavicle with a scalpel and a pocket was created using sharp and blunt dissection. Access to the left subclavian vein was obtained two times via the extrathoracic approach guided by the venogram. The RV lead was placed in the RV apical septum. The RA lead was placed in the RAA region. Lead data was evaluated as below. Pacing was performed at 10V on both leads to ensure no direct diaphragmatic or phrenic nerve stimulation. The leads were secured to the underlying fascia with 0-Ethibond suture. The pocket was irrigated with  antibiotic solution. The leads were connected to the new pulse generator and placed in the pocket via a tyrex.      The patient spinal stimulator was then turned on and increased to maximum output.  There was no noise on the device when being interrogated.  It was turned off, turned back on and increased from level 1 to level 7 (maximum). There was no noise on the leads the entire time the stimulator was manipulated.  It was turned back off again.     The pocket was closed in layers with 2-0 Vicryl for the deep fascia and 4-0 Vicryl for the skin. The wound was dressed and the left arm was placed in a sling. Fluoroscopy time was 4 minutes.     Device data:                                                                                Model Number Serial Number Sensing(mV) Impedance Pacing   Generator [place] [id number]         RA  [place] [id number] 670 1.0 @ 0.5ms   RV ( [place] [id number] 9.8 570 0.5 @ 0.5ms                                            Conclusions:  Successful dual chamber defibrillator implant  Adequate RA and RV sensing and pacing thresholds        Ranjit Hammond MD  Clinical Cardiac Electrophysiology  [facility]           MEDICATIONS ADMINISTERED IN LAST 1 DAY:  acetaminophen (Tylenol Extra Strength) tab 500 mg       Date Action Dose Route User    11/6/2024 2009 Given 500 mg Oral Loraine Guerrero RN          ceFAZolin (Ancef) 2g in 10mL IV syringe premix       Date Action Dose Route User    11/6/2024 1643 Started by Other 2 g Intravenous Reynaldo Landa RN          ceFAZolin (Ancef) 2g in 10mL IV syringe premix       Date Action Dose Route User    11/7/2024 0107 New Bag 2 g Intravenous Loraine Guerrero RN          insulin aspart (NovoLOG) 100 Units/mL FlexPen 2-10 Units       Date Action Dose Route User    11/6/2024 2333 Given 8 Units Subcutaneous (Right Upper Arm) Loraine Guerrero RN    11/6/2024 1905 Given 3 Units Subcutaneous (Left Upper Arm)  Reynaldo Landa RN    11/6/2024 1216 Given 2 Units Subcutaneous (Left Upper Arm) Reynaldo Landa RN          oxyCODONE immediate release tab 2.5 mg       Date Action Dose Route User    11/6/2024 2142 Given 2.5 mg Oral Loraine Guerrero RN          Perflutren Lipid Microsphere (DEFINITY) 6.52 MG/ML injection 1.5 mL       Date Action Dose Route User    11/6/2024 1606 Given 1.5 mL Intravenous Reyes, Alexis            Vitals (last day)       Date/Time Temp Pulse Resp BP SpO2 Weight O2 Device O2 Flow Rate (L/min) [facility]    11/07/24 0600 -- 73 14 140/99 98 % -- -- -- RP    11/07/24 0500 -- 66 20 117/70 97 % -- -- -- RP    11/07/24 0400 96.7 °F (35.9 °C) 65 16 111/68 99 % -- CPAP -- RP    11/07/24 0300 -- 63 16 126/77 100 % -- -- -- RP    11/07/24 0200 -- 67 17 131/82 99 % -- -- -- RP    11/07/24 0100 -- 67 17 116/77 98 % -- -- -- RP    11/07/24 0000 97.9 °F (36.6 °C) 69 18 126/77 99 % -- CPAP -- RP    11/06/24 2300 -- 67 17 111/67 98 % -- -- -- RP    11/06/24 2200 -- 68 17 -- 98 % -- -- -- RP    11/06/24 2157 -- 69 16 -- 98 % -- -- -- RP    11/06/24 2100 -- 69 17 123/73 99 % -- -- -- RP    11/06/24 2000 97.7 °F (36.5 °C) 70 18 128/68 98 % -- -- -- RP    11/06/24 1945 -- 73 19 124/75 97 % -- -- -- RP    11/06/24 1930 -- 75 20 118/74 98 % -- None (Room air) -- RP    11/06/24 1915 -- 76 20 122/75 99 % -- -- -- RP    11/06/24 1900 -- 78 24 132/74 100 % -- -- -- MM    11/06/24 1845 -- 75 19 117/73 99 % -- -- -- MM    11/06/24 1830 -- 76 21 125/77 98 % -- -- -- MM    11/06/24 1815 -- 77 21 129/66 96 % -- -- -- MM    11/06/24 1800 -- 77 20 117/66 95 % -- -- -- MM    11/06/24 1755 96.5 °F (35.8 °C) -- -- 126/70 -- -- -- -- MM    11/06/24 1500 -- 76 18 117/72 99 % -- -- -- MM    11/06/24 1400 -- 72 14 124/67 99 % -- -- -- MM    11/06/24 1300 -- 73 20 128/71 98 % -- -- -- MM    11/06/24 1200 98.2 °F (36.8 °C) 67 18 134/74 98 % -- None (Room air) -- MM    11/06/24 1100 -- 70 20 124/66 97 % -- -- -- MM    11/06/24 1000 -- 66 20  124/66 99 % -- -- -- MM    11/06/24 0900 -- 73 22 129/66 100 % -- -- -- MM    11/06/24 0800 98.4 °F (36.9 °C) 65 20 119/68 95 % -- -- -- MM    11/06/24 0700 -- 65 20 120/72 97 % -- -- -- NOEMI    11/06/24 0600 -- 63 19 132/72 95 % -- -- -- NOEMI    11/06/24 0500 -- 65 22 125/68 96 % -- -- -- NOEMI    11/06/24 0400 97.5 °F (36.4 °C) 66 21 120/66 95 % -- None (Room air) -- NOEMI    11/06/24 0300 -- 74 23 130/72 92 % -- -- -- NOEMI    11/06/24 0200 -- 73 25 125/69 96 % -- -- -- NOEMI    11/06/24 0100 -- 69 22 120/61 96 % -- -- -- NOEMI    11/06/24 0000 97.8 °F (36.6 °C) 83 21 126/67 98 % -- None (Room air) -- NOEMI

## 2024-11-07 NOTE — DISCHARGE SUMMARY
[facility] Discharge Summary    Patient ID  Yunior Garrett  [id number]  59 year old  4/12/1965    Admit date: 11/5/2024    Discharge date:  11/07/24    Attending: Jeanette Cabrera MD     Primary Care Physician: Antione Valadez DO      Reason for admission: [facility]B    Discharge condition: stable    Disposition: home    Important follow up:  -PCP within 7 d  -specialists:cards as directed      -labs:    -radiology:      Additional patient instructions       Discharge med list     Medication List        CONTINUE taking these medications      aspirin 81 MG Tabs     Insulin Pen Needle 32G X 4 MM Misc  Inject 1 Units into the skin daily.     Jardiance 25 MG Tabs  Generic drug: empagliflozin     rosuvastatin 20 MG Tabs  Commonly known as: Crestor            ASK your doctor about these medications      gabapentin 300 MG Caps  Commonly known as: Neurontin     Semglee (yfgn) 100 UNIT/ML Sopn  Generic drug: Insulin Glargine-yfgn     Vitamin D (Cholecalciferol) 25 MCG (1000 UT) Caps              Discharge Diagnoses:    CHB  Sarcoidosis  DM      Consults:  IP CONSULT TO CARDIOLOGY  IP CONSULT TO HOSPITALIST    Radiology:  XR CHEST PA + LAT CHEST (CPT=71046)    Result Date: 11/7/2024  PROCEDURE:  XR CHEST PA + LAT CHEST (CPT=71046)  INDICATIONS:  icd  COMPARISON:  [facility] , XR, XR CHEST AP PORTABLE  (CPT=71045), 5/14/2024, 9:47 AM.  [facility] , XR, XR CHEST AP PORTABLE  (CPT=71045), 11/05/2024, 8:31 PM.  TECHNIQUE:  PA and lateral chest radiographs were obtained.  PATIENT STATED HISTORY: (As transcribed by Technologist)  Post pacemaker surgery.    FINDINGS:  Cardiac silhouette and pulmonary vasculature within normal limits.  Persistent elevation of the right hemidiaphragm.  No focal consolidation, pneumothorax or pleural effusion.  Placement of a left pacemaker device with leads projecting over the expected location of the right atrium and ventricle.  Spinal stimulator device is re-identified, stable and at the level of T10,  correlate clinically.            CONCLUSION:  Placement of a left pacemaker device as above.   LOCATION:  [facility]   Dictated by (CST): Elina Banegas MD on 2024 at 10:02 AM     Finalized by (CST): Elina Banegas MD on 2024 at 10:09 AM       CATH EP    Result Date: 2024  This exam has been completed. Please refer to Notes for the results to this procedure.    CARD ECHO 2D DOPPLER CONTRAST (CPT=93306)    Result Date: 2024  Transthoracic Echocardiogram Name:Yunior Garrett Date: 2024 :  1965 Ht:  (65in)  BP: 117 / 72 MRN:  [id number]    Age:  59years    Wt:  (173lb) HR: 76bpm Loc:  [facility]       Gndr: M          BSA: 1.86m^2 Sonographer: BUBBA Vargas MS Ordering:    Adarsh Martinez Consulting:  Jeanette Cabrera ---------------------------------------------------------------------------- History/Indications:   Chest pain.  Dyspnea.  Elevated B-Type Natriuretic Peptide.  Risk factors:  Hypertension. Diabetes mellitus. Dyslipidemia. ---------------------------------------------------------------------------- Procedure information:  A transthoracic complete 2D study was performed. Additional evaluation included M-mode, complete spectral Doppler, and color Doppler.  Patient status:  Inpatient.  Location:  Bedside.    Comparison was made to the study of 05/15/2024.    This was a routine study. Transthoracic echocardiography for ventricular function evaluation and assessment of valvular function. Image quality was suboptimal. The study was technically limited due to poor acoustic window availability and body habitus. Intravenous contrast (Definity) was administered to opacify the LV. ECG rhythm:   Normal sinus ---------------------------------------------------------------------------- Conclusions: 1. Left ventricle: The cavity size was normal. Wall thickness was normal.    Systolic function was at the lower limits of normal. The estimated    ejection fraction was 50-55%, by visual assessment. No  diagnostic    evidence for regional wall motion abnormalities. Left ventricular    diastolic function parameters were normal for the patient's age. 2. Right ventricle: Systolic function was normal. 3. Left atrium: The left atrial volume was normal. 4. No significant valvular abnormalities. Impressions:  This study is compared with previous dated 5/15/2024: No significant change. * ---------------------------------------------------------------------------- * Findings: Left ventricle:  The cavity size was normal. Wall thickness was normal. Systolic function was at the lower limits of normal. The estimated ejection fraction was 50-55%, by visual assessment. No diagnostic evidence for regional wall motion abnormalities. Left ventricular diastolic function parameters were normal for the patient's age. Left atrium:  The left atrial volume was normal. Right ventricle:  The cavity size was normal. Systolic function was normal. Right atrium:  The atrium was normal in size. Mitral valve:  The valve was structurally normal. Leaflet separation was normal.  Doppler:  Transvalvular velocity was within the normal range. There was no evidence for stenosis. There was no significant regurgitation. Aortic valve:  The valve was structurally normal. The valve was trileaflet. Cusp separation was normal.  Doppler:  Transvalvular velocity was within the normal range. There was no evidence for stenosis. There was no significant regurgitation. Tricuspid valve:  The valve is structurally normal. Leaflet separation was normal.  Doppler:  Transvalvular velocity was within the normal range. There was no evidence for stenosis. There was no significant regurgitation. Pulmonic valve:    There was no significant valve disease.    Doppler: Transvalvular velocity was within the normal range. There was no evidence for stenosis. There was mild regurgitation. Pericardium:   There was no pericardial effusion. Aorta: Aortic root: The aortic root was  normal. Ascending aorta: The ascending aorta was normal. Pulmonary arteries: The main pulmonary artery was normal-sized.  Estimated pulmonary artery diastolic pressure was 9mm Hg. Systolic pressure could not be accurately estimated. Systemic veins: Inferior vena cava: The IVC was not visualized. ---------------------------------------------------------------------------- Measurements  Left ventricle         Value        Ref       05/15/2024  IVS thickness, ED,     0.8   cm     0.6 - 1.0 0.9  PLAX  LV ID, ED, PLAX        4.9   cm     4.2 - 5.8 4.9  LV ID, ES, PLAX        3.3   cm     2.5 - 4.0 3.7  LV PW thickness,       0.7   cm     0.6 - 1.0 0.9  ED, PLAX  IVS/LV PW ratio,       1.07         --------- 0.99  ED, PLAX  LV PW/LV ID ratio,     0.15         --------- 0.18  ED, PLAX  LV ejection            62    %      52 - 72   49  fraction  LV e', lateral     (L) 8.8   cm/sec >=10.0    9.0  LV E/e', lateral       5            <=13      7  LV e', medial      (L) 5.2   cm/sec >=7.0     6.0  LV E/e', medial        9            --------- 10  LV e', average         7.0   cm/sec --------- 7.5  LV E/e', average       7            <=14      8  Aortic root            Value        Ref       05/15/2024  Aortic root ID, ED     3.3   cm     2.6 - 4.0 3.4  Ascending aorta        Value        Ref       05/15/2024  Ascending aorta        3.5   cm     2.2 - 3.8 3.5  ID, A-P, ED  Left atrium            Value        Ref       05/15/2024  LA ID, A-P, ES         3.9   cm     3.0 - 4.0 3.6  LA volume, S           45    ml     18 - 58   62  LA volume/bsa, S       24    ml/m^2 16 - 34   34  LA volume, ES, 1-p     41    ml     18 - 58   61  A4C  LA volume, ES, 1-p     41    ml     18 - 58   62  A2C  LA volume, ES, A/L     48    ml     --------- 65  LA volume/bsa, ES,     26    ml/m^2 16 - 34   36  A/L  LA/aortic root         1.18         --------- 1.06  ratio  Mitral valve           Value        Ref       05/15/2024  Mitral E-wave peak      0.46  m/sec  --------- 0.61  velocity  Mitral A-wave peak     0.69  m/sec  --------- 0.73  velocity  Mitral E/A ratio,      0.7          --------- 0.8  peak  Pulmonary artery       Value        Ref       05/15/2024  PA pressure, ED,       9     mm Hg  --------- ----------  DP  Systemic veins         Value        Ref       05/15/2024  Estimated CVP          3     mm Hg  --------- 3  Right ventricle        Value        Ref       05/15/2024  TAPSE, 2D              1.75  cm     >=1.70    1.96  Pulmonic valve         Value        Ref       05/15/2024  Pulmonic regurg        1.21  m/sec  --------- 1.17  velocity, ED  Pulmonic regurg        4     mm Hg  --------- 5  gradient, ED Legend: (L)  and  (H)  ian values outside specified reference range. ---------------------------------------------------------------------------- Prepared and electronically signed by Adarsh Martinez 11/06/2024 17:01     XR CHEST AP PORTABLE  (CPT=71045)    Result Date: 11/5/2024  PROCEDURE:  XR CHEST AP PORTABLE  (CPT=71045)  TECHNIQUE:  AP chest radiograph was obtained.  COMPARISON:  [facility] , [facility], XR CHEST AP PORTABLE  (CPT=71045), 5/14/2024, 9:47 AM.  INDICATIONS:  CP, MAVIS, HR 35  PATIENT STATED HISTORY: (As transcribed by Technologist)  Patient complains of mid chest pain radiating to the left along with cough and shortness of breath.    FINDINGS:  Cardiomediastinal silhouette is stable in size and appearance.  Persistent elevation of the right hemidiaphragm.  No focal consolidation, pleural effusion, or pneumothorax.            CONCLUSION:  No focal consolidation.   LOCATION:  [facility]      Dictated by (CST): Elina Banegas MD on 11/05/2024 at 8:47 PM     Finalized by (CST): Elina Banegas MD on 11/05/2024 at 8:48 PM         Operative reports:      Hospital course:    59-year-old man with episodes of near syncope and who has a history of sarcoidosis, found to be in complete heart block     Complete heart block  - sp PPM  Uncomplicated post op course      Sarcoidosis  - has only been known in lungs but concern for cardiac involvement given CHB presentation   -outpt fu    DM  - SSI    Metabolic acidosis  -? Etilogy, no diarrhea, vomiting, infecitous etiouly  =better on repeat, lactate neg    Day of discharge exam:  Vitals:    11/07/24 1000   BP:    Pulse: 77   Resp: 21   Temp:      Feelsm uch better today  No nv  No abd pain  Mild dizziness but much improved from before  Ambulating    No acute distress, alert and oriented   Lungs clear  Heart regular  Abdomen benign    Total time coordinating care 32 min      Patient and/or family had opportunity to ask questions and expressed understanding and agreement with therapeutic plan as outlined         [facility] Hospitalist  [phone]  Answering Service: [phone]

## 2024-11-08 NOTE — PAYOR COMM NOTE
--------------  DISCHARGE REVIEW    Payor: [facility]  Subscriber #:  [id number]  Authorization Number: [id number]    Admit date: 11/5/24  Admit time:   9:33 PM  Discharge Date: 11/7/2024 12:44 PM     Admitting Physician: Jeanette Cabrera MD  Attending Physician:  No att. providers found  Primary Care Physician: Antione Valadez DO          Discharge Summary Notes        Discharge Summary signed by Noel Durham DO at 11/7/2024 12:22 PM       Author: Noel Durham DO Specialty: Internal Medicine Author Type: Physician    Filed: 11/7/2024 12:22 PM Date of Service: 11/7/2024 11:05 AM Status: Signed    : Noel Durham DO (Physician)         [facility] Discharge Summary    Patient ID  Yunior Garrett  [id number]  59 year old  4/12/1965    Admit date: 11/5/2024    Discharge date:  11/07/24    Attending: Jeanette Cabrera MD     Primary Care Physician: Antione Valadez DO      Reason for admission: CHB    Discharge condition: stable    Disposition: home    Important follow up:  -PCP within 7 d  -specialists:cards as directed      -labs:    -radiology:      Additional patient instructions       Discharge med list     Medication List        CONTINUE taking these medications      aspirin 81 MG Tabs     Insulin Pen Needle 32G X 4 MM Misc  Inject 1 Units into the skin daily.     Jardiance 25 MG Tabs  Generic drug: empagliflozin     rosuvastatin 20 MG Tabs  Commonly known as: Crestor            ASK your doctor about these medications      gabapentin 300 MG Caps  Commonly known as: Neurontin     Semglee (yfgn) 100 UNIT/ML Sopn  Generic drug: Insulin Glargine-yfgn     Vitamin D (Cholecalciferol) 25 MCG (1000 UT) Caps              Discharge Diagnoses:    CHB  Sarcoidosis  DM      Consults:  IP CONSULT TO CARDIOLOGY  IP CONSULT TO HOSPITALIST    Radiology:  XR CHEST PA + LAT CHEST (CPT=71046)    Result Date: 11/7/2024  PROCEDURE:  XR CHEST PA + LAT CHEST (CPT=71046)  INDICATIONS:  icd  COMPARISON:  [facility]  XR, XR CHEST AP PORTABLE  (CPT=71045), 2024, 9:47 AM.  [facility] , XR, XR CHEST AP PORTABLE  (CPT=71045), 2024, 8:31 PM.  TECHNIQUE:  PA and lateral chest radiographs were obtained.  PATIENT STATED HISTORY: (As transcribed by Technologist)  Post pacemaker surgery.    FINDINGS:  Cardiac silhouette and pulmonary vasculature within normal limits.  Persistent elevation of the right hemidiaphragm.  No focal consolidation, pneumothorax or pleural effusion.  Placement of a left pacemaker device with leads projecting over the expected location of the right atrium and ventricle.  Spinal stimulator device is re-identified, stable and at the level of T10, correlate clinically.            CONCLUSION:  Placement of a left pacemaker device as above.   LOCATION:  [facility]   Dictated by (CST): Elina Banegas MD on 2024 at 10:02 AM     Finalized by (CST): Elina Banegas MD on 2024 at 10:09 AM       CATH EP    Result Date: 2024  This exam has been completed. Please refer to Notes for the results to this procedure.    CARD ECHO 2D DOPPLER CONTRAST (CPT=93306)    Result Date: 2024  Transthoracic Echocardiogram Name:Yunior Garrett Date: 2024 :  1965 Ht:  (65in)  BP: 117 / 72 MRN:  [id number]    Age:  59years    Wt:  (173lb) HR: 76bpm Loc:  [facility]       Gndr: M          BSA: 1.86m^2 Sonographer: BUBBA Vargas MS Ordering:    Adarsh Martinez Consulting:  Jeanette Cabrera ---------------------------------------------------------------------------- History/Indications:   Chest pain.  Dyspnea.  Elevated B-Type Natriuretic Peptide.  Risk factors:  Hypertension. Diabetes mellitus. Dyslipidemia. ---------------------------------------------------------------------------- Procedure information:  A transthoracic complete 2D study was performed. Additional evaluation included M-mode, complete spectral Doppler, and color Doppler.  Patient status:  Inpatient.  Location:  Bedside.    Comparison was made to the study of  05/15/2024.    This was a routine study. Transthoracic echocardiography for ventricular function evaluation and assessment of valvular function. Image quality was suboptimal. The study was technically limited due to poor acoustic window availability and body habitus. Intravenous contrast (Definity) was administered to opacify the LV. ECG rhythm:   Normal sinus ---------------------------------------------------------------------------- Conclusions: 1. Left ventricle: The cavity size was normal. Wall thickness was normal.    Systolic function was at the lower limits of normal. The estimated    ejection fraction was 50-55%, by visual assessment. No diagnostic    evidence for regional wall motion abnormalities. Left ventricular    diastolic function parameters were normal for the patient's age. 2. Right ventricle: Systolic function was normal. 3. Left atrium: The left atrial volume was normal. 4. No significant valvular abnormalities. Impressions:  This study is compared with previous dated 5/15/2024: No significant change. * ---------------------------------------------------------------------------- * Findings: Left ventricle:  The cavity size was normal. Wall thickness was normal. Systolic function was at the lower limits of normal. The estimated ejection fraction was 50-55%, by visual assessment. No diagnostic evidence for regional wall motion abnormalities. Left ventricular diastolic function parameters were normal for the patient's age. Left atrium:  The left atrial volume was normal. Right ventricle:  The cavity size was normal. Systolic function was normal. Right atrium:  The atrium was normal in size. Mitral valve:  The valve was structurally normal. Leaflet separation was normal.  Doppler:  Transvalvular velocity was within the normal range. There was no evidence for stenosis. There was no significant regurgitation. Aortic valve:  The valve was structurally normal. The valve was trileaflet. Cusp separation  was normal.  Doppler:  Transvalvular velocity was within the normal range. There was no evidence for stenosis. There was no significant regurgitation. Tricuspid valve:  The valve is structurally normal. Leaflet separation was normal.  Doppler:  Transvalvular velocity was within the normal range. There was no evidence for stenosis. There was no significant regurgitation. Pulmonic valve:    There was no significant valve disease.    Doppler: Transvalvular velocity was within the normal range. There was no evidence for stenosis. There was mild regurgitation. Pericardium:   There was no pericardial effusion. Aorta: Aortic root: The aortic root was normal. Ascending aorta: The ascending aorta was normal. Pulmonary arteries: The main pulmonary artery was normal-sized.  Estimated pulmonary artery diastolic pressure was 9mm Hg. Systolic pressure could not be accurately estimated. Systemic veins: Inferior vena cava: The IVC was not visualized. ---------------------------------------------------------------------------- Measurements  Left ventricle         Value        Ref       05/15/2024  IVS thickness, ED,     0.8   cm     0.6 - 1.0 0.9  PLAX  LV ID, ED, PLAX        4.9   cm     4.2 - 5.8 4.9  LV ID, ES, PLAX        3.3   cm     2.5 - 4.0 3.7  LV PW thickness,       0.7   cm     0.6 - 1.0 0.9  ED, PLAX  IVS/LV PW ratio,       1.07         --------- 0.99  ED, PLAX  LV PW/LV ID ratio,     0.15         --------- 0.18  ED, PLAX  LV ejection            62    %      52 - 72   49  fraction  LV e', lateral     (L) 8.8   cm/sec >=10.0    9.0  LV E/e', lateral       5            <=13      7  LV e', medial      (L) 5.2   cm/sec >=7.0     6.0  LV E/e', medial        9            --------- 10  LV e', average         7.0   cm/sec --------- 7.5  LV E/e', average       7            <=14      8  Aortic root            Value        Ref       05/15/2024  Aortic root ID, ED     3.3   cm     2.6 - 4.0 3.4  Ascending aorta        Value         Ref       05/15/2024  Ascending aorta        3.5   cm     2.2 - 3.8 3.5  ID, A-P, ED  Left atrium            Value        Ref       05/15/2024  LA ID, A-P, ES         3.9   cm     3.0 - 4.0 3.6  LA volume, S           45    ml     18 - 58   62  LA volume/bsa, S       24    ml/m^2 16 - 34   34  LA volume, ES, 1-p     41    ml     18 - 58   61  A4C  LA volume, ES, 1-p     41    ml     18 - 58   62  A2C  LA volume, ES, A/L     48    ml     --------- 65  LA volume/bsa, ES,     26    ml/m^2 16 - 34   36  A/L  LA/aortic root         1.18         --------- 1.06  ratio  Mitral valve           Value        Ref       05/15/2024  Mitral E-wave peak     0.46  m/sec  --------- 0.61  velocity  Mitral A-wave peak     0.69  m/sec  --------- 0.73  velocity  Mitral E/A ratio,      0.7          --------- 0.8  peak  Pulmonary artery       Value        Ref       05/15/2024  PA pressure, ED,       9     mm Hg  --------- ----------  DP  Systemic veins         Value        Ref       05/15/2024  Estimated CVP          3     mm Hg  --------- 3  Right ventricle        Value        Ref       05/15/2024  TAPSE, 2D              1.75  cm     >=1.70    1.96  Pulmonic valve         Value        Ref       05/15/2024  Pulmonic regurg        1.21  m/sec  --------- 1.17  velocity, ED  Pulmonic regurg        4     mm Hg  --------- 5  gradient, ED Legend: (L)  and  (H)  ian values outside specified reference range. ---------------------------------------------------------------------------- Prepared and electronically signed by Adarsh Martinez 11/06/2024 17:01     XR CHEST AP PORTABLE  (CPT=71045)    Result Date: 11/5/2024  PROCEDURE:  XR CHEST AP PORTABLE  (CPT=71045)  TECHNIQUE:  AP chest radiograph was obtained.  COMPARISON:  [facility] , XR, XR CHEST AP PORTABLE  (CPT=71045), 5/14/2024, 9:47 AM.  INDICATIONS:  CP, MAVIS, HR 35  PATIENT STATED HISTORY: (As transcribed by Technologist)  Patient complains of mid chest pain radiating to the left along with cough  and shortness of breath.    FINDINGS:  Cardiomediastinal silhouette is stable in size and appearance.  Persistent elevation of the right hemidiaphragm.  No focal consolidation, pleural effusion, or pneumothorax.            CONCLUSION:  No focal consolidation.   LOCATION:  [facility]      Dictated by (CST): Elina Banegas MD on 11/05/2024 at 8:47 PM     Finalized by (CST): Elina Banegas MD on 11/05/2024 at 8:48 PM         Operative reports:      Hospital course:    59-year-old man with episodes of near syncope and who has a history of sarcoidosis, found to be in complete heart block     Complete heart block  - sp PPM  Uncomplicated post op course     Sarcoidosis  - has only been known in lungs but concern for cardiac involvement given CHB presentation   -outpt fu    DM  - SSI    Metabolic acidosis  -? Etilogy, no diarrhea, vomiting, infecitous etiouly  =better on repeat, lactate neg    Day of discharge exam:  Vitals:    11/07/24 1000   BP:    Pulse: 77   Resp: 21   Temp:      Feelsm uch better today  No nv  No abd pain  Mild dizziness but much improved from before  Ambulating    No acute distress, alert and oriented   Lungs clear  Heart regular  Abdomen benign    Total time coordinating care 32 min      Patient and/or family had opportunity to ask questions and expressed understanding and agreement with therapeutic plan as outlined         Noel Odom Hospitalist  [phone]  Answering Service: [phone]      Electronically signed by Noel Durham DO on 11/7/2024 12:22 PM         REVIEWER COMMENTS

## 2025-05-22 ENCOUNTER — HOSPITAL ENCOUNTER (EMERGENCY)
Facility: HOSPITAL | Age: 60
Discharge: HOME | End: 2025-05-23
Attending: EMERGENCY MEDICINE
Payer: COMMERCIAL

## 2025-05-22 ENCOUNTER — APPOINTMENT (OUTPATIENT)
Dept: GENERAL RADIOLOGY | Facility: HOSPITAL | Age: 60
End: 2025-05-22
Payer: COMMERCIAL

## 2025-05-22 VITALS
BODY MASS INDEX: 27.82 KG/M2 | TEMPERATURE: 98 F | OXYGEN SATURATION: 100 % | WEIGHT: 167 LBS | HEART RATE: 61 BPM | RESPIRATION RATE: 17 BRPM | DIASTOLIC BLOOD PRESSURE: 74 MMHG | SYSTOLIC BLOOD PRESSURE: 148 MMHG | HEIGHT: 65 IN

## 2025-05-22 DIAGNOSIS — R07.89 CHEST PAIN, ATYPICAL: Primary | ICD-10-CM

## 2025-05-22 LAB
ALBUMIN SERPL-MCNC: 5.2 G/DL (ref 3.2–4.8)
ALBUMIN/GLOB SERPL: 1.7 {RATIO} (ref 1–2)
ALP LIVER SERPL-CCNC: 113 U/L (ref 45–117)
ALT SERPL-CCNC: 17 U/L (ref 10–49)
ANION GAP SERPL CALC-SCNC: 8 MMOL/L (ref 0–18)
AST SERPL-CCNC: 19 U/L (ref ?–34)
BASOPHILS # BLD AUTO: 0.03 X10(3) UL (ref 0–0.2)
BASOPHILS NFR BLD AUTO: 0.4 %
BILIRUB SERPL-MCNC: 0.4 MG/DL (ref 0.2–1.1)
BUN BLD-MCNC: 32 MG/DL (ref 9–23)
CALCIUM BLD-MCNC: 9.8 MG/DL (ref 8.7–10.6)
CHLORIDE SERPL-SCNC: 112 MMOL/L (ref 98–112)
CO2 SERPL-SCNC: 20 MMOL/L (ref 21–32)
CREAT BLD-MCNC: 1.52 MG/DL (ref 0.7–1.3)
D DIMER PPP FEU-MCNC: 0.59 UG/ML FEU (ref ?–0.62)
EGFRCR SERPLBLD CKD-EPI 2021: 51 ML/MIN/1.73M2 (ref 60–?)
EOSINOPHIL # BLD AUTO: 0.11 X10(3) UL (ref 0–0.7)
EOSINOPHIL NFR BLD AUTO: 1.4 %
ERYTHROCYTE [DISTWIDTH] IN BLOOD BY AUTOMATED COUNT: 13.5 %
GLOBULIN PLAS-MCNC: 3.1 G/DL (ref 2–3.5)
GLUCOSE BLD-MCNC: 330 MG/DL (ref 70–99)
HCT VFR BLD AUTO: 43.5 % (ref 39–53)
HGB BLD-MCNC: 14.8 G/DL (ref 13–17.5)
IMM GRANULOCYTES # BLD AUTO: 0.02 X10(3) UL (ref 0–1)
IMM GRANULOCYTES NFR BLD: 0.3 %
LYMPHOCYTES # BLD AUTO: 0.92 X10(3) UL (ref 1–4)
LYMPHOCYTES NFR BLD AUTO: 12.1 %
MCH RBC QN AUTO: 29.8 PG (ref 26–34)
MCHC RBC AUTO-ENTMCNC: 34 G/DL (ref 31–37)
MCV RBC AUTO: 87.7 FL (ref 80–100)
MONOCYTES # BLD AUTO: 0.72 X10(3) UL (ref 0.1–1)
MONOCYTES NFR BLD AUTO: 9.5 %
NEUTROPHILS # BLD AUTO: 5.81 X10 (3) UL (ref 1.5–7.7)
NEUTROPHILS # BLD AUTO: 5.81 X10(3) UL (ref 1.5–7.7)
NEUTROPHILS NFR BLD AUTO: 76.3 %
OSMOLALITY SERPL CALC.SUM OF ELEC: 310 MOSM/KG (ref 275–295)
PLATELET # BLD AUTO: 191 10(3)UL (ref 150–450)
POTASSIUM SERPL-SCNC: 4.1 MMOL/L (ref 3.5–5.1)
PROT SERPL-MCNC: 8.3 G/DL (ref 5.7–8.2)
RBC # BLD AUTO: 4.96 X10(6)UL (ref 4.3–5.7)
SODIUM SERPL-SCNC: 140 MMOL/L (ref 136–145)
TROPONIN I SERPL HS-MCNC: 12 NG/L (ref ?–53)
WBC # BLD AUTO: 7.6 X10(3) UL (ref 4–11)

## 2025-05-22 PROCEDURE — 93010 ELECTROCARDIOGRAM REPORT: CPT

## 2025-05-22 PROCEDURE — 84484 ASSAY OF TROPONIN QUANT: CPT | Performed by: EMERGENCY MEDICINE

## 2025-05-22 PROCEDURE — 96375 TX/PRO/DX INJ NEW DRUG ADDON: CPT

## 2025-05-22 PROCEDURE — 99285 EMERGENCY DEPT VISIT HI MDM: CPT

## 2025-05-22 PROCEDURE — 85379 FIBRIN DEGRADATION QUANT: CPT | Performed by: EMERGENCY MEDICINE

## 2025-05-22 PROCEDURE — 93005 ELECTROCARDIOGRAM TRACING: CPT

## 2025-05-22 PROCEDURE — 80053 COMPREHEN METABOLIC PANEL: CPT | Performed by: EMERGENCY MEDICINE

## 2025-05-22 PROCEDURE — 71045 X-RAY EXAM CHEST 1 VIEW: CPT

## 2025-05-22 PROCEDURE — 96374 THER/PROPH/DIAG INJ IV PUSH: CPT

## 2025-05-22 PROCEDURE — 85025 COMPLETE CBC W/AUTO DIFF WBC: CPT | Performed by: EMERGENCY MEDICINE

## 2025-05-22 RX ORDER — ONDANSETRON 2 MG/ML
4 INJECTION INTRAMUSCULAR; INTRAVENOUS ONCE
Status: COMPLETED | OUTPATIENT
Start: 2025-05-22 | End: 2025-05-22

## 2025-05-22 RX ORDER — MORPHINE SULFATE 2 MG/ML
2 INJECTION, SOLUTION INTRAMUSCULAR; INTRAVENOUS ONCE
Status: COMPLETED | OUTPATIENT
Start: 2025-05-22 | End: 2025-05-22

## 2025-05-23 LAB — TROPONIN I SERPL HS-MCNC: 14 NG/L (ref ?–53)

## 2025-05-23 NOTE — ED PROVIDER NOTES
Patient Seen in: [facility] Emergency Department        History  Chief Complaint   Patient presents with    Chest Pain Angina     Stated Complaint: CP x1 hour, seen at [facility] today for heart issues. pacemaker hx    Subjective:   HPI            61 yo h/o sarcoidosis, htn, hl, ckd, dm, complete heart block s/p pacemaker/defibrillator 11/2024 numbesss in the left arm now only in the left finger. Never had these symptoms previously . Normaly experience sharp shooting pain starts in the middle goes to the left side. No cough/fever/chills/runny nose sore throat. Cough with SOB. Not SOB now, but was earlier. 5 hour drive on his way back. Was 2 hours into the drive. Cardiac sarcoid PET scan and nerve conduction study.     Objective:     Past Medical History:    Back problem    Benign essential HTN    Calculus of kidney    Cancer (HCC)    Melanoma    Diabetes (HCC)    High blood pressure    High cholesterol    Mixed hyperlipidemia    Osteoarthritis    Problems with swallowing    Renal disorder    CKD Stage2    Sarcoidosis    Sarcoidosis of lung (HCC)    Shortness of breath    Sleep apnea    Pt use CPAP at night    Type 2 diabetes mellitus without complication, without long-term current use of insulin (HCC)              Past Surgical History:   Procedure Laterality Date    Cath bare metal stent (bms)  10/2019    Colonoscopy N/A 9/29/2020    Procedure: COLONOSCOPY;  Surgeon: David Jo MD;  Location:  ENDOSCOPY    Other  07/2019    Left elbow surgery    Other surgical history  06/22/2022    Osteophytectomy cervical 2, cervical 3, cervical 4, cervical 5                 Social History     Socioeconomic History    Marital status:    Tobacco Use    Smoking status: Never    Smokeless tobacco: Never   Vaping Use    Vaping status: Never Used   Substance and Sexual Activity    Alcohol use: Never    Drug use: Never    Sexual activity: Yes     Partners: Male     Social Drivers of Health     Food  Insecurity: No Food Insecurity (4/22/2025)    Received from [facility]    Hunger Vital Sign     Worried About Running Out of Food in the Last Year: Never true     Ran Out of Food in the Last Year: Never true   Transportation Needs: No Transportation Needs (4/22/2025)    Received from [facility]    PRAPARE - Transportation     Lack of Transportation (Medical): No     Lack of Transportation (Non-Medical): No   Housing Stability: Low Risk  (4/22/2025)    Received from [facility]    Housing Stability     What is your living situation today?: I have a steady place to live                                Physical Exam    ED Triage Vitals [05/22/25 2117]   /87   Pulse 74   Resp 18   Temp 98 °F (36.7 °C)   Temp src Oral   SpO2 98 %   O2 Device None (Room air)       Current Vitals:   Vital Signs  BP: 148/74  Pulse: 61  Resp: 17  Temp: 98 °F (36.7 °C)  Temp src: Oral  MAP (mmHg): 96    Oxygen Therapy  SpO2: 100 %  O2 Device: None (Room air)            Physical Exam  Vitals and nursing note reviewed.   Constitutional:       Appearance: He is well-developed.   HENT:      Head: Normocephalic and atraumatic.   Eyes:      Pupils: Pupils are equal, round, and reactive to light.   Cardiovascular:      Rate and Rhythm: Normal rate and regular rhythm.   Pulmonary:      Effort: Pulmonary effort is normal.      Breath sounds: Normal breath sounds.   Abdominal:      General: Bowel sounds are normal.      Palpations: Abdomen is soft.   Musculoskeletal:         General: No deformity.      Cervical back: Normal range of motion and neck supple.   Skin:     General: Skin is warm and dry.      Capillary Refill: Capillary refill takes less than 2 seconds.   Neurological:      Mental Status: He is alert and oriented to person, place, and time.             ED Course  Labs Reviewed   COMP METABOLIC PANEL (14) - Abnormal; Notable for the following components:       Result Value    Glucose 330 (*)     CO2 20.0 (*)     BUN 32 (*)      Creatinine 1.52 (*)     Calculated Osmolality 310 (*)     eGFR-Cr 51 (*)     Total Protein 8.3 (*)     Albumin 5.2 (*)     All other components within normal limits   CBC WITH DIFFERENTIAL WITH PLATELET - Abnormal; Notable for the following components:    Lymphocyte Absolute 0.92 (*)     All other components within normal limits   TROPONIN I HIGH SENSITIVITY - Normal   D-DIMER - Normal   TROPONIN I HIGH SENSITIVITY - Normal   RAINBOW DRAW LAVENDER   RAINBOW DRAW LIGHT GREEN   RAINBOW DRAW BLUE     EKG    Rate, intervals and axes as noted on EKG Report.  Rate: 80  Rhythm: Sinus Rhythm  Reading: no gross st elevations or depressions  EKG    Rate, intervals and axes as noted on EKG Report.  Rate: 61  Rhythm: Sinus Rhythm  Reading: no gross st elevations or depressions there is a T wave inversion in lead III not seen on prior EKG            XR CHEST AP PORTABLE  (CPT=71045)  Result Date: 5/22/2025  CONCLUSION:  Normal heart size and pulmonary vascularity.  Elevation right hemidiaphragm.  Pacemaker leads in RA and RV.  No pneumothorax.   LOCATION:  [id number]      Dictated by (CST): Evy Arizmendi MD on 5/22/2025 at 9:54 PM     Finalized by (CST): Evy Arizmendi MD on 5/22/2025 at 9:56 PM                         MDM     61 yo male with c/o chest pain.  Heart score 2.  Differential diagnosis of angina versus pneumonia versus PE versus chronic chest pain.  Vital signs stable arrival patient appears nontoxic examination lung sounds clear heart sounds normal patient does have chest wall tenderness palpation pain is reproducible on examination.  Reports minimal paresthesias in his fingertips of his left hand of all 5 digits brisk cap refill sensation grossly intact to sharp versus dull approximately.  Neuro examination benign otherwise no focal neurodeficits seen on examination.  EKG sinus no gross ST changes seen for ischemia chest x-ray is clear no mediastinal widening effusions or infiltrates from independent evaluation of the  imaging.  Basic labs obtained.  BUN/creatinine mildly elevated however baseline for patient.  D-dimer negative unlikely PE.  2 sets of cardiac enzymes are negative.  No gross ST elevation or depressions.  Patient reports he is asymptomatic at this time however was given morphine on arrival for pain control of his chest wall pain.  I discussed with patient options of admission for stress echo for further evaluation of his chest pain however he reports he just returned from [facility] today and would like to go home would like to follow-up as an outpatient.  Discussed that is if his pain returns or if symptoms worsen that he needs to return to the ER immediately for further evaluation.  Did discuss that he should follow-up with duly cardiology assess where his EP physician is.  Stress echo order was placed in the system for patient if he is having difficulty with close follow-up.  Patient shows understand the plan and is in agreement with plan discharged home in stable condition.        Medical Decision Making      Disposition and Plan     Clinical Impression:  1. Chest pain, atypical         Disposition:  Discharge  5/23/2025 12:29 am    Follow-up:  Adarsh Martinez MD  [address] [place] [address]  [address] [place] [id number] [phone]    Call in 1 day(s)  to schedule an appointment with Dr. Martinez's group for your chest pain          Medications Prescribed:  Current Discharge Medication List                Supplementary Documentation:

## 2025-05-23 NOTE — ED INITIAL ASSESSMENT (HPI)
Pt to ED with c/o CP for the past 45 minutes. Left arm numbness, SOB.   Regular, non-labored breathing noted in triage.

## 2025-05-23 NOTE — DISCHARGE INSTRUCTIONS
Please follow-up with your primary care physician 1-2 days return to the ER if your symptoms worsen progress or if you have any further concerns.  Please call your cardiologist office to discuss obtaining an outpatient stress test.  Return to the ER if you have chest pain and tingling numbness in your left arm returns or if you develop worsening symptoms dizziness lightheadedness shortness of breath.    You can also call [phone] and speak with Central scheduling to schedule your stress test here at [facility].

## 2025-05-24 LAB
ATRIAL RATE: 61 BPM
ATRIAL RATE: 80 BPM
P AXIS: 31 DEGREES
P AXIS: 32 DEGREES
P-R INTERVAL: 210 MS
P-R INTERVAL: 214 MS
Q-T INTERVAL: 414 MS
Q-T INTERVAL: 452 MS
QRS DURATION: 132 MS
QRS DURATION: 132 MS
QTC CALCULATION (BEZET): 455 MS
QTC CALCULATION (BEZET): 477 MS
R AXIS: -56 DEGREES
R AXIS: -60 DEGREES
T AXIS: -10 DEGREES
T AXIS: 36 DEGREES
VENTRICULAR RATE: 61 BPM
VENTRICULAR RATE: 80 BPM

## (undated) DEVICE — DRAPE SHEET LARGE 76X55

## (undated) DEVICE — SUT ETHILON 3-0 PS-2 1669H

## (undated) DEVICE — DRESSING BIOPATCH 1X4 BLUE

## (undated) DEVICE — SUT MONOCRYL 3-0 PS-1 Y936H

## (undated) DEVICE — SUT SILK 2-0 SA85H

## (undated) DEVICE — CERVICAL CDS: Brand: MEDLINE INDUSTRIES, INC.

## (undated) DEVICE — APPLICATOR CHLORAPREP 10.5ML

## (undated) DEVICE — DRAPE SRG PED 124X74IN LAP 2

## (undated) DEVICE — CLOSURE EXOFIN 1.0ML

## (undated) DEVICE — 3M™ TEGADERM™ TRANSPARENT FILM DRESSING, 1626W, 4 IN X 4-3/4 IN (10 CM X 12 CM), 50 EACH/CARTON, 4 CARTON/CASE: Brand: 3M™ TEGADERM™

## (undated) DEVICE — 3.0MM PRECISION NEURO (MATCH HEAD)

## (undated) DEVICE — ENDOSCOPY PACK - LOWER: Brand: MEDLINE INDUSTRIES, INC.

## (undated) DEVICE — SOLUTION  .9 1000ML BTL

## (undated) DEVICE — X-RAY DETECTABLE SPONGES,16 PLY: Brand: VISTEC

## (undated) DEVICE — GAMMEX® PI HYBRID SIZE 8.5, STERILE POWDER-FREE SURGICAL GLOVE, POLYISOPRENE AND NEOPRENE BLEND: Brand: GAMMEX

## (undated) DEVICE — ENDOSCOPY PACK UPPER: Brand: MEDLINE INDUSTRIES, INC.

## (undated) DEVICE — WAX BONE 2.5G NONABSORBABLE

## (undated) DEVICE — GAUZE SPONGES,12 PLY: Brand: CURITY

## (undated) DEVICE — EVACUATOR URO RELIVAC 100CC

## (undated) DEVICE — FORCEP BIOPSY RJ4 LG CAP W/ND

## (undated) DEVICE — 1200CC GUARDIAN II: Brand: GUARDIAN

## (undated) DEVICE — Device: Brand: DEFENDO AIR/WATER/SUCTION AND BIOPSY VALVE

## (undated) DEVICE — DRAIN JACKSON PRATT RND7FR END: Brand: CARDINAL HEALTH

## (undated) DEVICE — SPONGE: SPECIALTY PEANUT XR 100/CS: Brand: MEDICAL ACTION INDUSTRIES

## (undated) DEVICE — CAUTERY TIP TEFLON MEGADYNE

## (undated) DEVICE — MICRO KOVER: Brand: UNBRANDED

## (undated) DEVICE — 3M™ RED DOT™ MONITORING ELECTRODE WITH FOAM TAPE AND STICKY GEL, 50/BAG, 20/CASE, 72/PLT 2570: Brand: RED DOT™

## (undated) DEVICE — SUT VICRYL 3-0 CP-2 J761D

## (undated) DEVICE — DRAPE SLUSH WARMER 44X66

## (undated) DEVICE — FORCEP CUSH BAY BP DISP 7.5IN

## (undated) DEVICE — FLOSEAL HEMOSTATIC MATRIX, 5ML: Brand: FLOSEAL HEMOSTATIC MATRIX

## (undated) DEVICE — PEN: MARKING STD PT 100/CS: Brand: MEDICAL ACTION INDUSTRIES

## (undated) DEVICE — C-ARMOR C-ARM EQUIPMENT COVERS CLEAR STERILE UNIVERSAL FIT 12 PER CASE: Brand: C-ARMOR

## (undated) DEVICE — FILTERLINE NASAL ADULT O2/CO2

## (undated) DEVICE — GAMMEX® NON-LATEX PI ORTHO SIZE 6.5, STERILE POLYISOPRENE POWDER-FREE SURGICAL GLOVE: Brand: GAMMEX

## (undated) DEVICE — DRAIN SILICONE FLAT 7X20

## (undated) DEVICE — SYRINGE BULB 50/CS 48/PLT: Brand: MEDEGEN MEDICAL PRODUCTS, LLC

## (undated) DEVICE — TOWEL SURG OR 17X30IN BLUE

## (undated) NOTE — LETTER
[facility] Department  Phone: [phone]  Right Fax: [phone]    ADDRESSEE INFORMATION: SENDER INFORMATION:   To:   *** From: ***     Department: Pre-Admission Testing   Fax Number: *** Date: 9/28/2020     Phone Number: *** Phon

## (undated) NOTE — LETTER
[facility] Department  Phone: [phone]  Right Fax: [phone]  [facility] [address] By:  Daphne Katz RN Date: 9/28/20    Patient Name: [facility]  Surgery Date: 9/29/2020    CSN: [id number]  Medical Record:

## (undated) NOTE — LETTER
[address]  [address]  Authorization for Surgical Operation and Procedure     Date:___________                                                                                                         Time:__________  I hereby authorize incision and drainage of carbuncle 5/16/24 , my physician Dr. Poon and his/her assistants (if applicable), which may include medical students, residents, and/or fellows, to perform the following surgical operation/ procedure and administer such anesthesia as may be determined necessary by my physician:  Operation/Procedure name (s)  on Yunior Garrett   2.   I recognize that during the surgical operation/procedure, unforeseen conditions may necessitate additional or different procedures than those listed above.  I, therefore, further authorize and request that the above-named surgeon, assistants, or designees perform such procedures as are, in their judgment, necessary and desirable.    3.   My surgeon/physician has discussed prior to my surgery the potential benefits, risks and side effects of this procedure; the likelihood of achieving goals; and potential problems that might occur during recuperation.  They also discussed reasonable alternatives to the procedure, including risks, benefits, and side effects related to the alternatives and risks related to not receiving this procedure.  I have had all my questions answered and I acknowledge that no guarantee has been made as to the result that may be obtained.    4.   Should the need arise during my operation/procedure, which includes change of level of care prior to discharge, I also consent to the administration of blood and/or blood products.  Further, I understand that despite careful testing and screening of blood or blood products by collecting agencies, I may still be subject to ill effects as a result of receiving a blood transfusion and/or blood products.  The following are some,  but not all, of the potential risks that can occur: fever and allergic reactions, hemolytic reactions, transmission of diseases such as Hepatitis, AIDS and Cytomegalovirus (CMV) and fluid overload.  In the event that I wish to have an autologous transfusion of my own blood, or a directed donor transfusion, I will discuss this with my physician.  Check only if Refusing Blood or Blood Products  I understand refusal of blood or blood products as deemed necessary by my physician may have serious consequences to my condition to include possible death. I hereby assume responsibility for my refusal and release the hospital, its personnel, and my physicians from any responsibility for the consequences of my refusal.          o  Refuse      5.   I authorize the use of any specimen, organs, tissues, body parts or foreign objects that may be removed from my body during the operation/procedure for diagnosis, research or teaching purposes and their subsequent disposal by hospital authorities.  I also authorize the release of specimen test results and/or written reports to my treating physician on the hospital medical staff or other referring or consulting physicians involved in my care, at the discretion of the Pathologist or my treating physician.    6.   I consent to the photographing or videotaping of the operations or procedures to be performed, including appropriate portions of my body for medical, scientific, or educational purposes, provided my identity is not revealed by the pictures or by descriptive texts accompanying them.  If the procedure has been photographed/videotaped, the surgeon will obtain the original picture, image, videotape or CD.  The hospital will not be responsible for storage, release or maintenance of the picture, image, tape or CD.    7.   I consent to the presence of a  or observers in the operating room as deemed necessary by my physician or their designees.    8.   I recognize  that in the event my procedure results in extended X-Ray/fluoroscopy time, I may develop a skin reaction.    9. If I have a Do Not Attempt Resuscitation (DNAR) order in place, that status will be suspended while in the operating room, procedural suite, and during the recovery period unless otherwise explicitly stated by me (or a person authorized to consent on my behalf). The surgeon or my attending physician will determine when the applicable recovery period ends for purposes of reinstating the DNAR order.  10. Patients having a sterilization procedure: I understand that if the procedure is successful the results will be permanent and it will therefore be impossible for me to inseminate, conceive, or bear children.  I also understand that the procedure is intended to result in sterility, although the result has not been guaranteed.   11. I acknowledge that my physician has explained sedation/analgesia administration to me including the risk and benefits I consent to the administration of sedation/analgesia as may be necessary or desirable in the judgment of my physician.    I CERTIFY THAT I HAVE READ AND FULLY UNDERSTAND THE ABOVE CONSENT TO OPERATION and/or OTHER PROCEDURE.    _________________________________________  __________________________________  Signature of Patient     Signature of Responsible Person         ___________________________________         Printed Name of Responsible Person           _________________________________                 Relationship to Patient  _________________________________________  ______________________________  Signature of Witness          Date  Time      Patient Name: Yunior Garrett     : 1965                 Printed: May 16, 2024     Medical Record #: [id number]                     [address] of [address] [place], [place] [address], [place]  [address]    Consent for Anesthesia    I, Yunior Garrett agree to be cared  for by an anesthesiologist, who is specially trained to monitor me and give me medicine to put me to sleep or keep me comfortable during my procedure    I understand that my anesthesiologist is not an employee or agent of [facility] or [employer]. He or she works for [employer].    As the patient asking for anesthesia services, I agree to:  Allow the anesthesiologist (anesthesia doctor) to give me medicine and do additional procedures as necessary. Some examples are: Starting or using an “IV” to give me medicine, fluids or blood during my procedure, and having a breathing tube placed to help me breathe when I’m asleep (intubation). In the event that my heart stops working properly, I understand that my anesthesiologist will make every effort to sustain my life, unless otherwise directed by [facility] Do Not Resuscitate documents.  Tell my anesthesia doctor before my procedure:  If I am pregnant.  The last time that I ate or drank.  All of the medicines I take (including prescriptions, herbal supplements, and pills I can buy without a prescription (including street drugs/illegal medications). Failure to inform my anesthesiologist about these medicines may increase my risk of anesthetic complications.  If I am allergic to anything or have had a reaction to anesthesia before.  I understand how the anesthesia medicine will help me (benefits).  I understand that with any type of anesthesia medicine there are risks:  The most common risks are: nausea, vomiting, sore throat, muscle soreness, damage to my eyes, mouth, or teeth (from breathing tube placement).  Rare risks include: remembering what happened during my procedure, allergic reactions to medications, injury to my airway, heart, lungs, vision, nerves, or muscles and in extremely rare instances death.  My doctor has explained to me other choices available to me for my care (alternatives).  Pregnant Patients (“epidural”):  I  understand that the risks of having an epidural (medicine given into my back to help control pain during labor), include itching, low blood pressure, difficulty urinating, headache or slowing of the baby’s heart. Very rare risks include infection, bleeding, seizure, irregular heart rhythms and nerve injury.  Regional Anesthesia (“spinal”, “epidural”, & “nerve blocks”):  I understand that rare but potential complications include headache, bleeding, infection, seizure, irregular heart rhythms, and nerve injury.    I can change my mind about having anesthesia services at any time before I get the medicine.    _____________________________________________________________________________  Patient (or Representative) Signature/Relationship to Patient  Date   Time    _____________________________________________________________________________   Name (if used)    Language/Organization   Time    _____________________________________________________________________________  Anesthesiologist Signature     Date   Time  I have discussed the procedure and information above with the patient (or patient’s representative) and answered their questions. The patient or their representative has agreed to have anesthesia services.    _____________________________________________________________________________  Witness        Date   Time  I have verified that the signature is that of the patient or patient’s representative, and that it was signed before the procedure  Patient Name: Yunior Garrett     : 1965                 Printed: May 16, 2024     Medical Record #: [id number]                     Page 2 of 2

## (undated) NOTE — LETTER
[facility] [address], [place], [place]  [address]  INFORMED CONSENT FOR TRANSFUSION OF BLOOD OR BLOOD PRODUCTS  My physician has informed me of the nature, purpose, benefits and risks of transfusion for blood and blood components that he/she may deem necessary during my treatment or hospitalization. He/she has also discussed alternatives to receiving blood from the voluntary blood supply with me, such as self-donation (autologous) and directed donation (blood donated by family or friends to be used specifically for me). I further understand that while the [address] [place] [address] will attempt to supply any autologous or directed donor blood prior to transfusion of blood from the routine blood supply, medical circumstances may require that other or additional blood components may be required for my care. In giving consent, I acknowledge that my physician has also informed me that despite careful screening and testing in accordance with national and regional regulations, there is still a small risk of transmission of infectious agents including hepatitis, HIV-1/2, cytomegalovirus and other viruses or diseases as yet unknown for which licensed definitive screening tests do not currently exist. Additionally, my physician has informed me of the potential for transfusion reactions not related to an infectious agent. [  ]  Check here for Recurring Outpatient Transfusion Therapy (valid for 1 year) In addition to the above, my physician has informed me that I shall receive numerous transfusions over a period of time and that these can lead to other increased risks. I hereby authorize the transfusion of blood and/or blood products to me as deemed necessary and ordered by physicians participating in my care.  My physician has given me the opportunity to ask questions and any questions asked have been answered to my satisfaction  __________________________________________ ______________________________________________  (Signature of Patient)                                                            (Responsible party in case of Minor,                                                                                                 Incompetent, or unconscious Patient)  ___________________________________________       ________ ______________________________________  (Relationship to Patient)                                                       (Signature of Witness)  ______________________________________________________________________________________________   (Date)                                                                           (Time)  REFUSAL OF CONSENT FOR BLOOD TRANSFUSIONS   Sign only if Refusing   [  ] I understand refusal of blood or blood products as deemed necessary by my physician may have serious consequences to my condition to include possible death.  I hereby assume responsibility for my refusal and release the hospital, its personnel, and my physicians from any responsibility for the consequences of my refusal.    ________________________________________________________________________________  (Signature of Patient)                                                         (Responsible Party/Relationship to Patient)    ________________________________________________________________________________  (Signature of Witness)                                                       (Date/Time)     Patient Name: Farhat Aguila     : 1965                 Printed: 2022      Medical Record #: [id number]                                 Page 1 of 1

## (undated) NOTE — LETTER
[facility] 4SW-A  [address] [address]  [phone]    Blood Transfusion Consent    In the course of your treatment, it may become necessary to administer a transfusion of blood or blood components. This form provides basic information concerning this procedure and, if signed by you, authorizes its administration. By signing this form, you agree that all of your questions about the administration of blood or blood products have been answered by the ordering medical professional or designee.    Description of Procedure  Blood is introduced into one of your veins, commonly in the arm, using a sterilized disposable needle. The amount of blood transfused, and whether the transfusion will be of blood or blood components is a judgement the physician will make based on your particular needs.    Risks  The transfusion is a common procedure of low risk.  MINOR AND TEMPORARY REACTIONS ARE NOT UNCOMMON, including a slight bruise, swelling or local reaction in the area where the needle pierces your skin, or a nonserious reaction to the transfused material itself, including headache, fever or mild skin reaction, such as rash.  Serious reactions are possible, though very unlikely, and include severe allergic reaction (shock) and destruction (hemolysis) of transfused blood cells.  Infectious diseases which are known to be transmitted by blood transfusion include certain types of viral Hepatitis(liver infection from a virus), Human Immunodeficiency Virus (HIV-1,2) infection, a viral infection known to cause Acquired Immunodeficiency Syndrome (AIDS), as well as certain other bacterial, viral, and parasitic diseases. While a minimal risk of acquiring an infectious disease from transfused blood exists, in accordance with the Federal and State law, all due care has been taken in donor selection and testing to avoid transmission of disease.    Alternatives  If loss of blood poses serious threats during your  treatment, THERE IS NO EFFECTIVE ALTERNATIVE TO BLOOD TRANSFUSION. However, if you have any further questions on this matter, your provider will fully explain the alternatives to you if it has not already been done.    I, ______________________________, have read/had read to me the above. I understand the matters bearing on the decision whether or not to authorize a transfusion of blood or blood components. I have no questions which have not been answered to my full satisfaction. I hereby consent to such transfusion as my physician may deem necessary or advisable in the course of my treatment.    ______________________________________________                    ___________________________  (Signature of Patient or Responsible party in case of minor,                 (Printed Name of Patient or incompetent, or unconscious patient)              Responsible Party)    ___________________________               _____________________  (Relationship to Patient if not self)                                    (Date and Time)    __________________________                                                           ______________________              (Signature of Witness)               (Printed Name of Witness)     Language line ()    Telephone/Verbal/Video Consent    __________________________                     ____________________  (Signature of 2nd Witness           (Printed Name of 2nd  Telephone/Verbal/Video Consent)           Witness)    Patient Name: Yunior Garrett     : 1965                 Printed: May 18, 2024     Medical Record #: [id number]      Rev: 2023

## (undated) NOTE — LETTER
[address]  [address]  Authorization for Surgical Operation and Procedure    Date:_11/6/2024__________                                                                                                         Time:_1400_________  1.  I hereby authorize Dr. Hammond my physician and his/her assistants (if applicable), which may include medical students, residents, and/or fellows, to perform the following surgical operation/ procedure and administer such anesthesia as may be determined necessary by my physician:  Operation/Procedure name Insertion of a Permanent Pacemaker  on Yunior Garrett   2.   I recognize that during the surgical operation/procedure, unforeseen conditions may necessitate additional or different procedures than those listed above.  I, therefore, further authorize and request that the above-named surgeon, assistants, or designees perform such procedures as are, in their judgment, necessary and desirable.    3.   My surgeon/physician has discussed prior to my surgery the potential benefits, risks and side effects of this procedure; the likelihood of achieving goals; and potential problems that might occur during recuperation.  They also discussed reasonable alternatives to the procedure, including risks, benefits, and side effects related to the alternatives and risks related to not receiving this procedure.  I have had all my questions answered and I acknowledge that no guarantee has been made as to the result that may be obtained.    4.   Should the need arise during my operation/procedure, which includes change of level of care prior to discharge, I also consent to the administration of blood and/or blood products.  Further, I understand that despite careful testing and screening of blood or blood products by collecting agencies, I may still be subject to ill effects as a result of receiving a blood transfusion and/or blood products.  The following are  some, but not all, of the potential risks that can occur: fever and allergic reactions, hemolytic reactions, transmission of diseases such as Hepatitis, AIDS and Cytomegalovirus (CMV) and fluid overload.  In the event that I wish to have an autologous transfusion of my own blood, or a directed donor transfusion, I will discuss this with my physician.  Check only if Refusing Blood or Blood Products  I understand refusal of blood or blood products as deemed necessary by my physician may have serious consequences to my condition to include possible death. I hereby assume responsibility for my refusal and release the hospital, its personnel, and my physicians from any responsibility for the consequences of my refusal.         o  Refuse    5.   I authorize the use of any specimen, organs, tissues, body parts or foreign objects that may be removed from my body during the operation/procedure for diagnosis, research or teaching purposes and their subsequent disposal by hospital authorities.  I also authorize the release of specimen test results and/or written reports to my treating physician on the hospital medical staff or other referring or consulting physicians involved in my care, at the discretion of the Pathologist or my treating physician.    6.   I consent to the photographing or videotaping of the operations or procedures to be performed, including appropriate portions of my body for medical, scientific, or educational purposes, provided my identity is not revealed by the pictures or by descriptive texts accompanying them.  If the procedure has been photographed/videotaped, the surgeon will obtain the original picture, image, videotape or CD.  The hospital will not be responsible for storage, release or maintenance of the picture, image, tape or CD.    7.   I consent to the presence of a  or observers in the operating room as deemed necessary by my physician or their designees.    8.   I recognize  that in the event my procedure results in extended X-Ray/fluoroscopy time, I may develop a skin reaction.    9. If I have a Do Not Attempt Resuscitation (DNAR) order in place, that status will be suspended while in the operating room, procedural suite, and during the recovery period unless otherwise explicitly stated by me (or a person authorized to consent on my behalf). The surgeon or my attending physician will determine when the applicable recovery period ends for purposes of reinstating the DNAR order.  10. Patients having a sterilization procedure: I understand that if the procedure is successful the results will be permanent and it will therefore be impossible for me to inseminate, conceive, or bear children.  I also understand that the procedure is intended to result in sterility, although the result has not been guaranteed.   11. I acknowledge that my physician has explained sedation/analgesia administration to me including the risk and benefits I consent to the administration of sedation/analgesia as may be necessary or desirable in the judgment of my physician.    I CERTIFY THAT I HAVE READ AND FULLY UNDERSTAND THE ABOVE CONSENT TO OPERATION and/or OTHER PROCEDURE.     _________________________________________  __________________________________  Signature of Patient     Signature of Responsible Person         ___________________________________         Printed Name of Responsible Person             _________________________________                 Relationship to Patient  _________________________________________  ______________________________  Signature of Witness          Date  Time      Patient Name: Yunior Garrett     : 1965                 Printed: 2024     Medical Record #: [id number]                                            Page 1 of 2      [address]  [address]    Consent for Anesthesia    I, Yunior Garrett agree to be cared  for by an anesthesiologist, who is specially trained to monitor me and give me medicine to put me to sleep or keep me comfortable during my procedure    I understand that my anesthesiologist is not an employee or agent of [facility] or [employer]. He or she works for [employer].    As the patient asking for anesthesia services, I agree to:  Allow the anesthesiologist (anesthesia doctor) to give me medicine and do additional procedures as necessary. Some examples are: Starting or using an “IV” to give me medicine, fluids or blood during my procedure, and having a breathing tube placed to help me breathe when I’m asleep (intubation). In the event that my heart stops working properly, I understand that my anesthesiologist will make every effort to sustain my life, unless otherwise directed by [facility] Do Not Resuscitate documents.  Tell my anesthesia doctor before my procedure:  If I am pregnant.  The last time that I ate or drank.  All of the medicines I take (including prescriptions, herbal supplements, and pills I can buy without a prescription (including street drugs/illegal medications). Failure to inform my anesthesiologist about these medicines may increase my risk of anesthetic complications.  If I am allergic to anything or have had a reaction to anesthesia before.  I understand how the anesthesia medicine will help me (benefits).  I understand that with any type of anesthesia medicine there are risks:  The most common risks are: nausea, vomiting, sore throat, muscle soreness, damage to my eyes, mouth, or teeth (from breathing tube placement).  Rare risks include: remembering what happened during my procedure, allergic reactions to medications, injury to my airway, heart, lungs, vision, nerves, or muscles and in extremely rare instances death.  My doctor has explained to me other choices available to me for my care (alternatives).  Pregnant Patients (“epidural”):  I  understand that the risks of having an epidural (medicine given into my back to help control pain during labor), include itching, low blood pressure, difficulty urinating, headache or slowing of the baby’s heart. Very rare risks include infection, bleeding, seizure, irregular heart rhythms and nerve injury.  Regional Anesthesia (“spinal”, “epidural”, & “nerve blocks”):  I understand that rare but potential complications include headache, bleeding, infection, seizure, irregular heart rhythms, and nerve injury.    I can change my mind about having anesthesia services at any time before I get the medicine.    _____________________________________________________________________________  Patient (or Representative) Signature/Relationship to Patient  Date   Time    _____________________________________________________________________________   Name (if used)    Language/Organization   Time    _____________________________________________________________________________  Anesthesiologist Signature     Date   Time  I have discussed the procedure and information above with the patient (or patient’s representative) and answered their questions. The patient or their representative has agreed to have anesthesia services.    _____________________________________________________________________________  Witness        Date   Time  I have verified that the signature is that of the patient or patient’s representative, and that it was signed before the procedure  Patient Name: Yunior Garrett     : 1965                 Printed: 2024     Medical Record #: [id number]                     Page 2 of 2

## (undated) NOTE — LETTER
[address] [place] [address]      Authorization for Surgical Operation and Procedure     Date:___________                                                                                                         Time:__________  1. I hereby Phillip Dougherty MD, Africa Azar MD, my physician and his/her assistants (if applicable), which may include medical students, residents, and/or fellows, to perform the following surgical operation/ procedure and administer such anesthesia as may be determined necessary by my physician:  Operation/Procedure name (s) Osteophytectomy cervical 2, cervical 3, cervical 4, cervical 5, possible anterior cervical discectomy  on Gareth Ocala   2. I recognize that during the surgical operation/procedure, unforeseen conditions may necessitate additional or different procedures than those listed above. I, therefore, further authorize and request that the above-named surgeon, assistants, or designees perform such procedures as are, in their judgment, necessary and desirable. 3.   My surgeon/physician has discussed prior to my surgery the potential benefits, risks and side effects of this procedure; the likelihood of achieving goals; and potential problems that might occur during recuperation. They also discussed reasonable alternatives to the procedure, including risks, benefits, and side effects related to the alternatives and risks related to not receiving this procedure. I have had all my questions answered and I acknowledge that no guarantee has been made as to the result that may be obtained. 4.   Should the need arise during my operation or immediate post-operative period, I also consent to the administration of blood and/or blood products.   Further, I understand that despite careful testing and screening of blood or blood products by collecting agencies, I may still be subject to ill effects as a result of receiving a blood transfusion and/or blood products. The following are some, but not all, of the potential risks that can occur: fever and allergic reactions, hemolytic reactions, transmission of diseases such as Hepatitis, AIDS and Cytomegalovirus (CMV) and fluid overload. In the event that I wish to have an autologous transfusion of my own blood, or a directed donor transfusion. I will discuss this with my physician. 5.   I authorize the use of any specimen, organs, tissues, body parts or foreign objects that may be removed from my body during the operation/procedure for diagnosis, research or teaching purposes and their subsequent disposal by hospital authorities. I also authorize the release of specimen test results and/or written reports to my treating physician on the hospital medical staff or other referring or consulting physicians involved in my care, at the discretion of the Pathologist or my treating physician. 6.   I consent to the photographing or videotaping of the operations or procedures to be performed, including appropriate portions of my body for medical, scientific, or educational purposes, provided my identity is not revealed by the pictures or by descriptive texts accompanying them. If the procedure has been photographed/videotaped, the surgeon will obtain the original picture, image, videotape or CD. The hospital will not be responsible for storage, release or maintenance of the picture, image, tape or CD.    7.   I consent to the presence of a  or observers in the operating room as deemed necessary by my physician or their designees. 8.   I recognize that in the event my procedure results in extended X-Ray/fluoroscopy time, I may develop a skin reaction. 9.  If I have a Do Not Attempt Resuscitation (DNAR) order in place, that status will be suspended while in the operating room, procedural suite, and during the recovery period unless otherwise explicitly stated by me (or a person authorized to consent on my behalf). The surgeon or my attending physician will determine when the applicable recovery period ends for purposes of reinstating the DNAR order. 10. Patients having a sterilization procedure: I understand that if the procedure is successful the results will be permanent and it will therefore be impossible for me to inseminate, conceive, or bear children. I also understand that the procedure is intended to result in sterility, although the result has not been guaranteed. 11. I acknowledge that my physician has explained sedation/analgesia administration to me including the risk and benefits I consent to the administration of sedation/analgesia as may be necessary or desirable in the judgment of my physician. I CERTIFY THAT I HAVE READ AND FULLY UNDERSTAND THE ABOVE CONSENT TO OPERATION and/or OTHER PROCEDURE.    _________________________________________  __________________________________  Signature of Patient     Signature of Responsible Person         ___________________________________         Printed Name of Responsible Person           _________________________________                  Relationship to Patient  _________________________________________  ______________________________  Signature of Witness          Date  Time    STATEMENT OF PHYSICIAN My signature below affirms that prior to the time of the procedure; I have explained to the patient and/or his/her legal representative, the risks and benefits involved in the proposed treatment and any reasonable alternative to the proposed treatment. I have also explained the risks and benefits involved in refusal of the proposed treatment and alternatives to the proposed treatment and have answered the patient's questions.  If I have a significant financial interest in a co-management agreement or a significant financial interest in any product or implant, or other significant relationship used in this procedure/surgery, I have disclosed this and had a discussion with my patient.     _______________________________________________________________ _____________________________  [address] Physician)                                                                                         (Date)                                   (Time)        Patient Name: Erik Cano    : 1965   Printed: 2022      Medical Record #: [id number]                                              Page 1 of 1